# Patient Record
Sex: MALE | Race: BLACK OR AFRICAN AMERICAN | Employment: FULL TIME | URBAN - METROPOLITAN AREA
[De-identification: names, ages, dates, MRNs, and addresses within clinical notes are randomized per-mention and may not be internally consistent; named-entity substitution may affect disease eponyms.]

---

## 2017-02-07 ENCOUNTER — OFFICE VISIT (OUTPATIENT)
Dept: FAMILY MEDICINE | Facility: CLINIC | Age: 52
End: 2017-02-07

## 2017-02-07 VITALS
HEART RATE: 105 BPM | BODY MASS INDEX: 30.61 KG/M2 | TEMPERATURE: 100.4 F | WEIGHT: 195 LBS | DIASTOLIC BLOOD PRESSURE: 77 MMHG | SYSTOLIC BLOOD PRESSURE: 130 MMHG | HEIGHT: 67 IN | OXYGEN SATURATION: 97 %

## 2017-02-07 DIAGNOSIS — I10 ESSENTIAL HYPERTENSION WITH GOAL BLOOD PRESSURE LESS THAN 140/90: ICD-10-CM

## 2017-02-07 DIAGNOSIS — M75.42 IMPINGEMENT SYNDROME, SHOULDER, LEFT: Primary | ICD-10-CM

## 2017-02-07 DIAGNOSIS — M75.112 INCOMPLETE TEAR OF LEFT ROTATOR CUFF: ICD-10-CM

## 2017-02-07 DIAGNOSIS — E78.5 HYPERLIPIDEMIA LDL GOAL <130: ICD-10-CM

## 2017-02-07 LAB
ALT SERPL W P-5'-P-CCNC: 25 U/L (ref 0–70)
ANION GAP SERPL CALCULATED.3IONS-SCNC: 8 MMOL/L (ref 3–14)
BUN SERPL-MCNC: 14 MG/DL (ref 7–30)
CALCIUM SERPL-MCNC: 9.7 MG/DL (ref 8.5–10.1)
CHLORIDE SERPL-SCNC: 100 MMOL/L (ref 94–109)
CHOLEST SERPL-MCNC: 166 MG/DL
CO2 SERPL-SCNC: 29 MMOL/L (ref 20–32)
CREAT SERPL-MCNC: 0.96 MG/DL (ref 0.66–1.25)
GFR SERPL CREATININE-BSD FRML MDRD: 82 ML/MIN/1.7M2
GLUCOSE SERPL-MCNC: 112 MG/DL (ref 70–99)
HDLC SERPL-MCNC: 51 MG/DL
LDLC SERPL CALC-MCNC: 88 MG/DL
NONHDLC SERPL-MCNC: 115 MG/DL
POTASSIUM SERPL-SCNC: 3.9 MMOL/L (ref 3.4–5.3)
SODIUM SERPL-SCNC: 137 MMOL/L (ref 133–144)
TRIGL SERPL-MCNC: 133 MG/DL

## 2017-02-07 PROCEDURE — 99214 OFFICE O/P EST MOD 30 MIN: CPT | Performed by: FAMILY MEDICINE

## 2017-02-07 PROCEDURE — 84460 ALANINE AMINO (ALT) (SGPT): CPT | Performed by: FAMILY MEDICINE

## 2017-02-07 PROCEDURE — 80061 LIPID PANEL: CPT | Performed by: FAMILY MEDICINE

## 2017-02-07 PROCEDURE — 80048 BASIC METABOLIC PNL TOTAL CA: CPT | Performed by: FAMILY MEDICINE

## 2017-02-07 PROCEDURE — 36415 COLL VENOUS BLD VENIPUNCTURE: CPT | Performed by: FAMILY MEDICINE

## 2017-02-07 RX ORDER — SIMVASTATIN 40 MG
40 TABLET ORAL EVERY EVENING
Qty: 90 TABLET | Refills: 1 | Status: SHIPPED | OUTPATIENT
Start: 2017-02-07 | End: 2017-08-14

## 2017-02-07 RX ORDER — SULINDAC 200 MG/1
200 TABLET ORAL 2 TIMES DAILY WITH MEALS
Qty: 60 TABLET | Refills: 1 | Status: SHIPPED | OUTPATIENT
Start: 2017-02-07 | End: 2020-09-23

## 2017-02-07 ASSESSMENT — PAIN SCALES - GENERAL: PAINLEVEL: SEVERE PAIN (6)

## 2017-02-07 NOTE — PATIENT INSTRUCTIONS
================================================================================  Normal Values   Blood pressure  <140/90 for most adults    <130/80 for some chronic diseases (ask your care team about yours)    BMI (body mass index)  18.5-25 kg/m2 (based on height and weight)     Thank you for visiting [facility]    Normal or non-critical lab and imaging results will be communicated to you by MyChart, letter or phone within 7 days.  If you do not hear from us within 10 days, please call the clinic. If you have a critical or abnormal lab result, we will notify you by phone as soon as possible.     If you have any questions regarding your visit please contact:     Team Comfort:   Clinic Hours Telephone Number   Dr. Maurice Dean 7am-5pm  Monday - Friday [phone]  Peyton RN  Liane Montesinos RN   Pharmacy 8:00am-8pm Monday-Friday    9am-5pm Saturday-Sunday [phone]   Urgent Care 11am-9pm Monday-Friday        9am-5pm Saturday-Sunday [phone]     After hours, weekend or if you need to make an appointment with your primary provider please call [phone].   After Hours nurse advise: call [facility]: [phone]    Medication Refills:  Call your pharmacy and they will forward the refill to us. Please allow 3 business days for your refills to be completed.

## 2017-02-07 NOTE — MR AVS SNAPSHOT
After Visit Summary   2/7/2017    Jose Alejandro Deras    MRN: [id number]           Patient Information     Date Of Birth          1965        Visit Information        Provider Department      2/7/2017 4:40 PM Maurice Barroso MD [facility]        Today's Diagnoses     Impingement syndrome, shoulder, left    -  1     Incomplete tear of left rotator cuff         Hyperlipidemia LDL goal <130         Essential hypertension with goal blood pressure less than 140/90           Care Instructions        ================================================================================  Normal Values   Blood pressure  <140/90 for most adults    <130/80 for some chronic diseases (ask your care team about yours)    BMI (body mass index)  18.5-25 kg/m2 (based on height and weight)     Thank you for visiting [facility]    Normal or non-critical lab and imaging results will be communicated to you by MyChart, letter or phone within 7 days.  If you do not hear from us within 10 days, please call the clinic. If you have a critical or abnormal lab result, we will notify you by phone as soon as possible.     If you have any questions regarding your visit please contact:     Team Comfort:   Clinic Hours Telephone Number   Dr. Maurice Gudino Dr. Vocal 7am-5pm  Monday - Friday [phone]  Peyton Montesinos RN   Pharmacy 8:00am-8pm Monday-Friday    9am-5pm Saturday-Sunday [phone]   Urgent Care 11am-9pm Monday-Friday        9am-5pm Saturday-Sunday [phone]     After hours, weekend or if you need to make an appointment with your primary provider please call [phone].   After Hours nurse advise: call [facility]: [phone]    Medication Refills:  Call your pharmacy and they will forward the refill to us. Please allow 3 business days for your refills to be completed.                 Follow-ups after your visit        Additional Services     ORTHO  REFERRAL       [facility] is referring you to the Orthopedic  Services at [facility].       The  Representative will assist you in the coordination of your Orthopedic and Musculoskeletal Care as prescribed by your physician.    The  Representative will call you within 1 business day to help schedule your appointment, or you may contact the  Representative at:    All areas ~ [phone]     Type of Referral : Surgical / Specialist (saw Dr. Monroe previously)      Timeframe requested: Routine    Coverage of these services is subject to the terms and limitations of your health insurance plan.  Please call member services at your health plan with any benefit or coverage questions.      If X-rays, CT or MRI's have been performed, please contact the facility where they were done to arrange for , prior to your scheduled appointment.  Please bring this referral request to your appointment and present it to your specialist.                  Who to contact     If you have questions or need follow up information about today's clinic visit or your schedule please contact [facility] directly at [phone].  Normal or non-critical lab and imaging results will be communicated to you by MyChart, letter or phone within 4 business days after the clinic has received the results. If you do not hear from us within 7 days, please contact the clinic through [employer] or phone. If you have a critical or abnormal lab result, we will notify you by phone as soon as possible.  Submit refill requests through [employer] or call your pharmacy and they will forward the refill request to us. Please allow 3 business days for your refill to be completed.          Additional Information About Your Visit        [employer] Information     [employer] lets you send messages to your  "doctor, view your test results, renew your prescriptions, schedule appointments and more. To sign up, go to [email or web address] . Click on \"Log in\" on the left side of the screen, which will take you to the Welcome page. Then click on \"Sign up Now\" on the right side of the page.     You will be asked to enter the access code listed below, as well as some personal information. Please follow the directions to create your username and password.     Your access code is: [email or web address]  Expires: 2017  5:00 PM     Your access code will  in 90 days. If you need help or a new code, please call your [place] clinic or [phone].        Care EveryWhere ID     This is your Care EveryWhere ID. This could be used by other organizations to access your [facility] medical records  [id number]        Your Vitals Were     Pulse Temperature Height BMI (Body Mass Index) Pulse Oximetry       105 100.4  F (38  C) (Oral) 5' 7\" (1.702 m) 30.53 kg/m2 97%        Blood Pressure from Last 3 Encounters:   17 130/77   06/10/16 118/69   16 124/78    Weight from Last 3 Encounters:   17 195 lb (88.451 kg)   06/10/16 198 lb 3.2 oz (89.903 kg)   16 208 lb (94.348 kg)              We Performed the Following     ALT     Basic metabolic panel     Lipid panel reflex to direct LDL     ORTHO  REFERRAL          Today's Medication Changes          These changes are accurate as of: 17  5:00 PM.  If you have any questions, ask your nurse or doctor.               Start taking these medicines.        Dose/Directions    sulindac 200 MG tablet   Commonly known as:  CLINORIL   Used for:  Impingement syndrome, shoulder, left, Incomplete tear of left rotator cuff   Started by:  Maurice Barroso MD        Dose:  200 mg   Take 1 tablet (200 mg) by mouth 2 times daily (with meals)   Quantity:  60 tablet   Refills:  1         Stop taking these medicines if you haven't already. Please contact your care team if you have " questions.     ibuprofen 800 MG tablet   Commonly known as:  ADVIL/MOTRIN   Stopped by:  Maurice Barroso MD                Where to get your medicines      These medications were sent to [id number] - [facility], [place] - [address] AT [facility] [address] [place] [address]     Phone:  [phone]    - simvastatin 40 MG tablet  - sulindac 200 MG tablet             Primary Care Provider Office Phone # Fax #    Maurice Barroso -[phone]       [facility] [address]  [facility] [place] [address]        Thank you!     Thank you for choosing [facility]  for your care. Our goal is always to provide you with excellent care. Hearing back from our patients is one way we can continue to improve our services. Please take a few minutes to complete the written survey that you may receive in the mail after your visit with us. Thank you!             Your Updated Medication List - Protect others around you: Learn how to safely use, store and throw away your medicines at www.disposemymeds.org.          This list is accurate as of: 2/7/17  5:00 PM.  Always use your most recent med list.                   Brand Name Dispense Instructions for use    chlorthalidone 25 MG tablet    HYGROTON    90 tablet    TAKE 1 TABLET(25 MG) BY MOUTH DAILY FOR BLOOD PRESSURE       lisinopril 20 MG tablet    PRINIVIL/ZESTRIL    90 tablet    TAKE 1 TABLET(20 MG) BY MOUTH DAILY FOR BLOOD PRESSURE       MULTIPLE VITAMINS PO      Take by mouth daily       simvastatin 40 MG tablet    ZOCOR    90 tablet    Take 1 tablet (40 mg) by mouth every evening for cholesterol.       sulindac 200 MG tablet    CLINORIL    60 tablet    Take 1 tablet (200 mg) by mouth 2 times daily (with meals)

## 2017-02-07 NOTE — PROGRESS NOTES
"  SUBJECTIVE:                                                    Jose Alejandro Deras is a 51 year old male who presents to clinic today for the following health issues:    Joint Pain     Onset: chronic, worsening a few weeks ago     Description:   Location: left shoulder  Character: Sharp and Dull ache    Intensity: 6/10    Progression of Symptoms: worsening    Accompanying Signs & Symptoms:  radiation of pain to elbow & upper back       History:   Previous similar pain: YES - chronic, no known injury    Precipitating factors:   Trauma or overuse: no     Alleviating factors:  Improved by: cortisone injection - helped a lot       Therapies Tried and outcome:  Heat & cold, Cortisone injections, seen Ortho (Dr. Monroe), MRI      Past medical, family, and social histories, medications, and allergies are reviewed and updated in Epic.     ROS:  C: NEGATIVE for fever, chills, change in weight  E/M: NEGATIVE for ear, mouth and throat problems  R: NEGATIVE for significant cough or SOB  CV: NEGATIVE for chest pain, palpitations or peripheral edema  ROS otherwise negative      Any history above obtained by the Medical Assistant was reviewed by Dr. Maurice Barroso MD, and edited when necessary.    This document serves as a record of the services and decisions personally performed and made by Dr. Barroso. It was created on his behalf by Raina Johns, a trained medical scribe. The creation of this document is based on the provider's statements to the medical scribe.  Raina Johns February 7, 2017 4:34 PM   OBJECTIVE:                                                    /77 mmHg  Pulse 105  Temp(Src) 100.4  F (38  C) (Oral)  Ht 5' 7\" (1.702 m)  Wt 195 lb (88.451 kg)  BMI 30.53 kg/m2  SpO2 97%  Body mass index is 30.53 kg/(m^2).  GENERAL: healthy, alert and no distress  EYES: Eyes grossly normal to inspection, PERRL and conjunctivae and sclerae normal  MS: no gross musculoskeletal defects noted, no edema  SKIN: no " suspicious lesions or rashes  NEURO: Normal strength and tone, mentation intact and speech normal, cranial nerves 2-12 intact   PSYCH: mentation appears normal, affect normal/bright      ASSESSMENT/PLAN:                                                    (M75.42) Impingement syndrome, shoulder, left  (primary encounter diagnosis)  (M75.112) Incomplete tear of left rotator cuff  Comment: the patient is interested in getting another steroid injection  Plan: sulindac (CLINORIL) 200 MG tablet, ORTHO          REFERRAL. ORDER FOR DME (arm sling)            (E78.5) Hyperlipidemia LDL goal <130  Comment: non-fasting. historically at goal, needs lab and prescriptions updated.  Plan: simvastatin (ZOCOR) 40 MG tablet, Lipid panel         reflex to direct LDL, ALT            (I10) Essential hypertension with goal blood pressure less than 140/90  Comment: well controlled  Plan: Basic metabolic panel               The information in this document, created by the medical scribe for me, accurately reflects the services I personally performed and the decisions made by me. I have reviewed and approved this document for accuracy prior to leaving the patient care area.  Maurice Barroso MD

## 2017-02-07 NOTE — LETTER
[facility]  [address]  [place] [address]      February 28, 2017      Jose Alejandro Deras  [address]  [facility] [place] [address]            Dear Jose Alejandro Deras    All of your labs were normal for you.     Enclosed is a copy of the results.  It was a pleasure to see you at your last appointment.    If you have any questions or concerns, please call myself or my nurse at [phone].      Sincerely,      Maurice Barroso MD/JUAN DANIEL Truong MA      Results for orders placed or performed in visit on 02/07/17   Lipid panel reflex to direct LDL   Result Value Ref Range    Cholesterol 166 <200 mg/dL    Triglycerides 133 <150 mg/dL    HDL Cholesterol 51 >39 mg/dL    LDL Cholesterol Calculated 88 <100 mg/dL    Non HDL Cholesterol 115 <130 mg/dL   ALT   Result Value Ref Range    ALT 25 0 - 70 U/L   Basic metabolic panel   Result Value Ref Range    Sodium 137 133 - 144 mmol/L    Potassium 3.9 3.4 - 5.3 mmol/L    Chloride 100 94 - 109 mmol/L    Carbon Dioxide 29 20 - 32 mmol/L    Anion Gap 8 3 - 14 mmol/L    Glucose 112 (H) 70 - 99 mg/dL    Urea Nitrogen 14 7 - 30 mg/dL    Creatinine 0.96 0.66 - 1.25 mg/dL    GFR Estimate 82 >60 mL/min/1.7m2    GFR Estimate If Black >90   GFR Calc   >60 mL/min/1.7m2    Calcium 9.7 8.5 - 10.1 mg/dL

## 2017-02-07 NOTE — NURSING NOTE
"Chief Complaint   Patient presents with     Shoulder Pain     Left       Initial /77 mmHg  Pulse 105  Temp(Src) 100.4  F (38  C) (Oral)  Ht 5' 7\" (1.702 m)  Wt 195 lb (88.451 kg)  BMI 30.53 kg/m2  SpO2 97% Estimated body mass index is 30.53 kg/(m^2) as calculated from the following:    Height as of this encounter: 5' 7\" (1.702 m).    Weight as of this encounter: 195 lb (88.451 kg).  Medication Reconciliation: malou Hare MA      "

## 2017-02-15 ENCOUNTER — OFFICE VISIT (OUTPATIENT)
Dept: ORTHOPEDICS | Facility: CLINIC | Age: 52
End: 2017-02-15

## 2017-02-15 VITALS — BODY MASS INDEX: 30.61 KG/M2 | HEIGHT: 67 IN | WEIGHT: 195 LBS | RESPIRATION RATE: 18 BRPM

## 2017-02-15 DIAGNOSIS — M75.42 IMPINGEMENT SYNDROME, SHOULDER, LEFT: Primary | ICD-10-CM

## 2017-02-15 PROCEDURE — 20610 DRAIN/INJ JOINT/BURSA W/O US: CPT | Mod: LT | Performed by: ORTHOPAEDIC SURGERY

## 2017-02-15 RX ORDER — TRIAMCINOLONE ACETONIDE 40 MG/ML
40 INJECTION, SUSPENSION INTRA-ARTICULAR; INTRAMUSCULAR ONCE
Qty: 1 ML | Refills: 0 | OUTPATIENT
Start: 2017-02-15 | End: 2017-02-15

## 2017-02-15 NOTE — MR AVS SNAPSHOT
"              After Visit Summary   2/15/2017    Jose Alejandro Deras    MRN: [id number]           Patient Information     Date Of Birth          1965        Visit Information        Provider Department      2/15/2017 4:00 PM Tj Monroe MD [facility]         Follow-ups after your visit        Your next 10 appointments already scheduled     Feb 15, 2017  4:00 PM CST   Return Visit with Tj Monroe MD   [facility] ([facility])    [address] [place] [address] [phone]              Who to contact     If you have questions or need follow up information about today's clinic visit or your schedule please contact [facility] directly at [phone].  Normal or non-critical lab and imaging results will be communicated to you by MyChart, letter or phone within 4 business days after the clinic has received the results. If you do not hear from us within 7 days, please contact the clinic through MyChart or phone. If you have a critical or abnormal lab result, we will notify you by phone as soon as possible.  Submit refill requests through [employer] or call your pharmacy and they will forward the refill request to us. Please allow 3 business days for your refill to be completed.          Additional Information About Your Visit        MyChart Information     [employer] lets you send messages to your doctor, view your test results, renew your prescriptions, schedule appointments and more. To sign up, go to [email or web address] . Click on \"Log in\" on the left side of the screen, which will take you to the Welcome page. Then click on \"Sign up Now\" on the right side of the page.     You will be asked to enter the access code listed below, as well as some personal information. Please follow the directions to create your username and password.     Your access code is: [email or web address]  Expires: 5/8/2017  5:00 PM   " "  Your access code will  in 90 days. If you need help or a new code, please call your [place] clinic or [phone].        Care EveryWhere ID     This is your Care EveryWhere ID. This could be used by other organizations to access your [facility] medical records  [id number]        Your Vitals Were     Respirations Height BMI (Body Mass Index)             18 1.702 m (5' 7\") 30.54 kg/m2          Blood Pressure from Last 3 Encounters:   17 130/77   06/10/16 118/69   16 124/78    Weight from Last 3 Encounters:   02/15/17 88.5 kg (195 lb)   17 88.5 kg (195 lb)   06/10/16 89.9 kg (198 lb 3.2 oz)              Today, you had the following     No orders found for display       Primary Care Provider Office Phone # Fax #    Maurice Barroso -[phone]       [facility] [address]  [facility] [address]        Thank you!     Thank you for choosing [facility]  for your care. Our goal is always to provide you with excellent care. Hearing back from our patients is one way we can continue to improve our services. Please take a few minutes to complete the written survey that you may receive in the mail after your visit with us. Thank you!             Your Updated Medication List - Protect others around you: Learn how to safely use, store and throw away your medicines at www.disposemymeds.org.          This list is accurate as of: 2/15/17  3:56 PM.  Always use your most recent med list.                   Brand Name Dispense Instructions for use    chlorthalidone 25 MG tablet    HYGROTON    90 tablet    TAKE 1 TABLET(25 MG) BY MOUTH DAILY FOR BLOOD PRESSURE       lisinopril 20 MG tablet    PRINIVIL/ZESTRIL    90 tablet    TAKE 1 TABLET(20 MG) BY MOUTH DAILY FOR BLOOD PRESSURE       MULTIPLE VITAMINS PO      Take by mouth daily       simvastatin 40 MG tablet    ZOCOR    90 tablet    Take 1 tablet (40 mg) by mouth every evening for cholesterol.       " sulindac 200 MG tablet    CLINORIL    60 tablet    Take 1 tablet (200 mg) by mouth 2 times daily (with meals)

## 2017-02-15 NOTE — NURSING NOTE
"Chief Complaint   Patient presents with     RECHECK     Left shoulder pain last injection 4/27/16.       Initial Resp 18  Ht 1.702 m (5' 7\")  Wt 88.5 kg (195 lb)  BMI 30.54 kg/m2 Estimated body mass index is 30.54 kg/(m^2) as calculated from the following:    Height as of this encounter: 1.702 m (5' 7\").    Weight as of this encounter: 88.5 kg (195 lb).  Medication Reconciliation: complete   Nida Rojas MA      "

## 2017-02-15 NOTE — PROGRESS NOTES
.  CHIEF COMPLAINT:   Chief Complaint   Patient presents with     RECHECK     Left shoulder pain last injection 4/27/16.       HISTORY:  Jose Alejandro Deras is a 51 year old male, right  -hand dominant, who is seen for left shoulder pain that started 3.5 years ago. No injury. The pain started in his neck, then radiated down the back and side of shoulder, now down to the elbow and past to near the wrist. He has had x-ray and MRI, negative, 2013. Last cortisone injection 4/27/2016, 10 months ago, notes this worked great. Pain started coming back a few weeks ago. Pain has been episodic, came out of nowhere. Positive night pain. Denies numbness and tingling. Has tried vicodin, but hasn't helped, previously helped with rest, Tylenol, and cortisone injections. He would like repeat injections today.       Onset: ~3.5 years ago without injury, woke up with pain.  Symptoms have been slow, improving since that time.  Aggrevated by: activities  Relieved by: rest  Present symptoms: pain with ADL's (dressing),  pain with overhead activities,  pain reaching behind back,  pain reaching out or away from body (flexion/ abduction),  positional night pain,  pain lifting,  Pain location: posterior shoulder, side of shoulder and down the arm  Pain severity: 3/10  Pain quality: shooting  Frequency of symptoms: are constant  Associated symptoms: neck pain, radiating pain to elbow and forearm, which seem to the patient to be related to the shoulder symptoms    Treatment up to this point: rest, Tylenol, corticosteroid injection 4/27/2016, and Vicodin  Has not tried: ice, Heat, NSAIDS, PT   Prior history of related problems: yes, shoulder pain off/on over the years alleviated with tylenol, rest    Usual level of work activity: .    Other PMH:  has a past medical history of Erectile dysfunction; Essential hypertension, benign; Hyperlipidemia LDL goal <130; Hypotestosteronism; IGT (impaired glucose tolerance); and Vitamin D  deficiency.  Patient Active Problem List    Diagnosis Date Noted     Impingement syndrome, shoulder 11/20/2013     Priority: Medium     Rotator cuff tendinitis 11/20/2013     Priority: Medium     Obesity, Class I, BMI 30-34.9 08/23/2013     Priority: Medium     Hypotestosteronism 04/30/2012     Priority: Medium     Vitamin D deficiency 04/30/2012     Priority: Medium     Impaired fasting glucose 04/30/2012     Priority: Medium     Hyperlipidemia LDL goal <130 04/12/2012     Priority: Medium     Essential hypertension with goal blood pressure less than 140/90 04/12/2012     Priority: Medium     Erectile dysfunction 04/12/2012     Priority: Medium       Surgical Hx:  has a past surgical history that includes no history of surgery.    Medications:   Current Outpatient Prescriptions:      simvastatin (ZOCOR) 40 MG tablet, Take 1 tablet (40 mg) by mouth every evening for cholesterol., Disp: 90 tablet, Rfl: 1     sulindac (CLINORIL) 200 MG tablet, Take 1 tablet (200 mg) by mouth 2 times daily (with meals), Disp: 60 tablet, Rfl: 1     lisinopril (PRINIVIL,ZESTRIL) 20 MG tablet, TAKE 1 TABLET(20 MG) BY MOUTH DAILY FOR BLOOD PRESSURE, Disp: 90 tablet, Rfl: 1     chlorthalidone (HYGROTON) 25 MG tablet, TAKE 1 TABLET(25 MG) BY MOUTH DAILY FOR BLOOD PRESSURE, Disp: 90 tablet, Rfl: 1     MULTIPLE VITAMINS PO, Take by mouth daily, Disp: , Rfl:     Allergies: No Known Allergies    Social Hx: .  reports that he has never smoked. He has never used smokeless tobacco. He reports that he drinks alcohol. He reports that he does not use illicit drugs.    Family Hx: family history includes DIABETES in his mother; Hypertension in his mother. There is no history of C.A.D., CEREBROVASCULAR DISEASE, or CANCER..    REVIEW OF SYSTEMS:   CONSTITUTIONAL:NEGATIVE for fever, chills, change in weight  INTEGUMENTARY/SKIN: NEGATIVE for worrisome rashes, moles or lesions  MUSCULOSKELETAL:See HPI above  NEURO: NEGATIVE for weakness,  "dizziness or paresthesias    PHYSICAL EXAM:  Resp 18  Ht 1.702 m (5' 7\")  Wt 88.5 kg (195 lb)  BMI 30.54 kg/m2   GENERAL APPEARANCE: healthy, alert, no distress  SKIN: no suspicious lesions or rashes  NEURO: Normal strength and tone, mentation intact and speech normal  PSYCH:  mentation appears normal and affect normal, not anxious  RESPIRATORY: No increased work of breathing.  VASCULAR: Radial pulses 2+ and brisk cappillary refill     MUSCULOSKELETAL:          RIGHT UPPER EXTREMITY:  Sensation intact to light touch in median, radial, ulnar and axillary nerve distributions  Palpable 2+ radial pulse, brisk capillary refill to all fingers, wwp  Intact epl fpl fdp edc wrist flexion/extension biceps triceps deltoid    RIGHT SHOULDER:  Shoulder Inspection: no swelling, bruising, discoloration, or obvious deformity or asymmetry  no atrophy  Tender: none  Non-tender: SC joint, proximal-mid clavicle, mid-distal clavicle, AC joint, acromion, anterior capsule, proximal bicep tendon, greater tuberosity, proximal humerus, supraspinatus , infraspinatus, upper trapezius muscle and rhomboids  Range of Motion:   Active:forward flexion 180 degrees, external rotation  70 degrees, internal rotation  T12   Passive: same  Strength: forward flexion 5/5, External rotation 5/5  Liftoff: Able, internal rotation to T10, causes muscle spasm of the right shoulder.  Impingement: negative.  Special tests: Spurling's: Negative  Belly Press: Negative  Empty Can: Negative    LEFT UPPER EXTREMITY:  Sensation intact to light touch in median, radial, ulnar and axillary nerve distributions  Palpable 2+ radial pulse, brisk capillary refill to all fingers, wwp  Intact epl fpl fdp edc wrist flexion/extension biceps triceps deltoid    LEFT SHOULDER:  Shoulder Inspection: no swelling, bruising, discoloration, or obvious deformity or asymmetry  no atrophy  Tender: greater tuberosity, supraspinatus , upper trapezius muscle and rhomboids  Non-tender: SC joint, " proximal-mid clavicle, mid-distal clavicle, AC joint, acromion, anterior capsule and proximal bicep tendon  Range of Motion:   Active:forward flexion 170 degrees, external rotation  70 degrees, internal rotation  T10   Passive: same  Strength: forward flexion 5/5,  External rotation 5/5, Liftoff: Able, internal rotation T10  Impingement: 2+  Special tests: Herkimer's: Negative  Yergason's: Negative  Speed: Negative  Spurling's: Negative  Belly Press: Negative  Empty Can: Negative        X-RAY INTERPRETATION: no new xrays today.  3 views left  shoulder obtained 10/22/2013 were reviewed personally in clinic today with the patient. On my review, No obvious fracture or dislocation. No obvious bony abnormality or lesion. Mild-moderate acromio-clavicular degenerative changes.      MRI left  shoulder:  11/5/2013  IMPRESSION:  1. Mild to moderate tendinopathy of the right supraspinatus, with a  partial-thickness, undersurface tear near the attachment. Minimal  undersurface fraying of the infraspinatus is also noted. Subscapularis  is not well evaluated due to redundancy given positioning of the  shoulder in internal rotation; however, there may be minimal  undersurface fraying of the superior fibers.  2. Moderate to severe acromioclavicular joint osteoarthritis.  3. Small tear of the posterior-superior glenoid labrum, with minimal  fraying along the far anterior inferior labrum. No detached fragments  are seen.    ASSESSMENT: Jose Alejandro Deras is a 51 year old male, right -hand dominant with left shoulder pain, partial thickness rotator cuff tear with tendinosis, impingement syndrome, acromio-clavicular arthritis, labral degeneration.     PLAN:   * Reviewed imaging studies with patient. Also, clinical exam findings. Consistent with impingement syndrome, rotator cuff tendinosis with partial thickness tearing, acromio-clavicular arthritis. Did emphasize that the MRI is from 2013, so things could have changed from them with  respect to the rotator cuff.  * discussed obtaining a new MRI, or try repeating injection.  * patient elects for injection today.    Treatment:    * Rest  * Activity modification - avoid activities that aggravate symptoms or started symptoms at onset.  * NSAIDS - regular use for inflammation, with food, as long as no contra-indications. Please discuss with pcp if needed.  * Ice twice daily to three times daily, 15-20 minutes at a time  * heat may be beneficial prior to exercising  * Physical Therapy for strengthening, stretching and range of motion exercises of rotator cuff and periscapular stabilization.  * Tylenol as needed for pain  * Injections: cortisone injections may be beneficial to help decrease swelling and inflammation within the shoulder or bursa, and decrease pain. With decreased pain, Physical Therapy and exercises will be more effective and efficient. Patient elects to proceed with cortisone injection.  * All questions and concerns addressed to patient satisfaction before discharge from clinic.   * Return to clinic as needed.    * consider repeat MRI in future.      PROCEDURE NOTE:  The risks, perceived benefits and potential complications (including but not limited to: bleeding, infection, pain, scar, damage to adjacent structures, atrophy or necrosis of soft tissue, skin blanching, failure to relieve symptoms, worsening of symptoms, allergic reaction) of injection were discussed with the patient. Questions were addressed and answered.The patient elected to proceed. Written informed consent was obtained. The correct procedural site was identified and confirmed. A Left Shoulder intraarticular injection was performed using 2mL Kenalog-40 40mg per mL and 7mL (4mL 1% lidocaine, 3mL 0.25% marcaine)  of local anesthetic after sterile prep, to the correct procedural site. Sterile bandaid applied. This was tolerated well by the patient. No apparent complications. Did also discuss that if diabetic, recommend  close monitoring of blood sugars over the next week as cortisone injections can temporarily elevate blood sugars.    The information in this document, created by a scribe for me, accurate reflects the services I personally performed and the decisions made by me. I have reviewed and approved this document for accuracy.      Tj Monroe M.D., M.S.  Dept. of Orthopaedic Surgery  [facility]

## 2017-02-23 ENCOUNTER — TELEPHONE (OUTPATIENT)
Dept: FAMILY MEDICINE | Facility: CLINIC | Age: 52
End: 2017-02-23

## 2017-02-23 NOTE — TELEPHONE ENCOUNTER
Reason for Call:  Request for results:    Name of test or procedure: Basic Metabolic Panel, ALT, Lipid Reflex to Direct LDL Panel    Date of test of procedure: 02/07/17    Location of the test or procedure: [facility]    OK to leave the result message on voice mail or with a family member? YES    Phone number Patient can be reached at:  Home number on file [phone] (home)    Additional comments: Pt calling for he came in for a lab apt on 02/07/17 and would like a call back to go over results as well as have those results mailed back to his home.     Call taken on 2/23/2017 at 10:32 AM by Basim Laughlin

## 2017-02-27 NOTE — TELEPHONE ENCOUNTER
Pt is very impatient now for these results  It is almost a month he says    He left a message thurs AM  Please call him today    Thank you

## 2017-05-09 ENCOUNTER — TELEPHONE (OUTPATIENT)
Dept: FAMILY MEDICINE | Facility: CLINIC | Age: 52
End: 2017-05-09

## 2017-05-09 NOTE — LETTER
[address] [address]  [phone]  Dept: [phone]      May 9, 2017      Jose Alejandro Deras  [address]  [facility] [address]      Dear Jose Alejandro Deras,     At [facility] we care about your health and are committed to providing quality patient care.    Which includes staying current on preventive cancer screenings.  You can increase your chances of finding and treating cancers through regular screenings.      Our records indicate you may be due for the following preventive screening(s):    Colonoscopy    Colonoscopy is recommended every ten years for everyone age 50 and older. Please take a moment to read over the enclosed information packet about colon cancer screening. We strongly urge our patient's to consider having a colonoscopy done, which is the best screening test available and only needs to be done every 10 years if normal. If you are unwilling or unable to have a colonoscopy then we recommend the annual stool testing for blood. This test is called a FIT test and it looks for blood in the stool.     To schedule an appointment or discuss this screening further, you may contact us by phone at the [facility] at [phone] or online through the patient [email or web address] @ [email or web address]    If you have had any of the screenings listed above at another facility, please call us so that we may update your chart.      Your partners in health,         Quality Committee at [facility]/[email or web address]

## 2017-05-09 NOTE — TELEPHONE ENCOUNTER
Panel Management Review      BP Readings from Last 1 Encounters:   02/07/17 130/77    ,   Lab Results   Component Value Date    A1C 6.0 04/20/2016    A1C 5.9 08/23/2013   , 2/23/2017    Fail List measure: Colonoscopy      Patient is due/failing the following:   COLONOSCOPY/FIT    Action needed:   Letter    Type of outreach:    Sent letter.    Questions for provider review:    None                                                                                                                                    Alexi Wilder      Chart routed to  .

## 2017-05-11 ENCOUNTER — OFFICE VISIT (OUTPATIENT)
Dept: FAMILY MEDICINE | Facility: CLINIC | Age: 52
End: 2017-05-11

## 2017-05-11 VITALS
DIASTOLIC BLOOD PRESSURE: 84 MMHG | OXYGEN SATURATION: 96 % | HEART RATE: 93 BPM | WEIGHT: 188 LBS | TEMPERATURE: 98.4 F | SYSTOLIC BLOOD PRESSURE: 132 MMHG | HEIGHT: 67 IN | BODY MASS INDEX: 29.51 KG/M2

## 2017-05-11 DIAGNOSIS — G51.0 BELL'S PALSY: Primary | ICD-10-CM

## 2017-05-11 DIAGNOSIS — Z12.11 SCREEN FOR COLON CANCER: ICD-10-CM

## 2017-05-11 PROCEDURE — 99173 VISUAL ACUITY SCREEN: CPT | Performed by: FAMILY MEDICINE

## 2017-05-11 PROCEDURE — 99214 OFFICE O/P EST MOD 30 MIN: CPT | Performed by: FAMILY MEDICINE

## 2017-05-11 PROCEDURE — 92551 PURE TONE HEARING TEST AIR: CPT | Performed by: FAMILY MEDICINE

## 2017-05-11 RX ORDER — VALACYCLOVIR HYDROCHLORIDE 1 G/1
1000 TABLET, FILM COATED ORAL 3 TIMES DAILY
Qty: 21 TABLET | Refills: 0 | Status: SHIPPED | OUTPATIENT
Start: 2017-05-11 | End: 2017-05-18

## 2017-05-11 RX ORDER — PREDNISONE 20 MG/1
60 TABLET ORAL
Qty: 21 TABLET | Refills: 0 | Status: SHIPPED | OUTPATIENT
Start: 2017-05-11 | End: 2017-09-19

## 2017-05-11 NOTE — PATIENT INSTRUCTIONS
This summary includes the important diagnoses, test, medications and other important parts of your medical history.  Below are a few good we sites you can use to learn more about these.     Www.ivWatch.org : Up to date and easily searchable information on multiple topics.  Www.ivWatch.org/Pharmacy/c_[email or web address].asp : [facility] $4.99 medications  Www.medlineplus.gov : medication info, interactive tutorials, watch real surgeries online  Www.familydoctor.org : good info from the Academy of Family Physicians  Www.[facility].com : good info from the [facility]  Www.cdc.gov : public health info, travel advisories, epidemics (H1N1)  Www.aap.org : children's health info, normal development, vaccinations  Www.health.[facility].[place].us : [place] dept of heat, public health issues in [place], N1N1    Based on your medical history and these are the current health maintenance or preventive care services that you are due for (some may have been done at this visit:)  Health Maintenance Due   Topic Date Due     FIT Q1 YR (NO INBASKET)  04/25/2017     =================================================================================  Normal Values   Blood pressure  <140/90 for most adults    <130/80 for some chronic diseases (ask your care team about yours)    BMI (body mass index)  18.5-25 kg/m2 (based on height and weight)     Thank you for visiting [facility]    Normal or non-critical lab and imaging results will be communicated to you by MyChart, letter or phone within 7 days.  If you do not hear from us within 10 days, please call the clinic. If you have a critical or abnormal lab result, we will notify you by phone as soon as possible.     If you have any questions regarding your visit please contact:     Team Comfort:   Clinic Hours Telephone Number   Dr. Maurice Leggett   7am-5pm  Monday - Friday [phone]  [facility]   Pharmacy 8am-8pm  Monday-Thursday      8am-6pm Friday  9am-5pm Saturday- [phone]   Urgent Care 11am-8pm Monday-Friday        9am-5pm Saturday- [phone]     After hours, weekend or if you need to make an appointment with your primary provider please call [phone].   After Hours nurse advise: call [facility]: [phone]    Medication Refills:  Call your pharmacy and they will forward the refill to us. Please allow 3 business days for your refills to be completed.    Use [employer] (secure email communication and access to your chart) to send your primary care provider a message or make an appointment. Ask someone on your Team how to sign up for [employer]. To log on to [employer] or for more information in [employer] please visit the website at [email or web address].  As of 2013, all password changes, disabled accounts, or ID changes in [employer]/MyHealth will be done by our Access Services Department.   If you need help with your account or password, call: [phone]. Clinic staff no longer has the ability to change passwords.         Please call [facility]/[phone] to schedule your brain MRI.     Bell s Palsy  Bell's Palsy is a problem involving the nerve that controls the muscles on one side of the face.    The cause is unknown, but may be related to inflammation of the nerve. Symptoms usually appear only on the affected side. They may include:    Inability to close the eyelid    Tearing of the eye    Facial drooping    Drooling    Numbness or pain    Changes in taste    Sound sensitivity  Damage to the eye can be a serious problem. The inability to blink can cause the eye to dry out. An ulcer (sore) can then form on the cornea. Also, not blinking means that the eye has no protection from dirt and dust particles.   Treatment involves protecting and moistening the eye. Medicines may also help.  Most persons recover completely within 3 to 6  months. However, the condition sometimes returns months or years later.  Home care    Get plenty of rest and eat a healthy diet to help yourself recover.    Use Artificial Tears frequently during the day and at bedtime to prevent drying. These drops are available without prescription at your drug store.    Wear protective glasses especially when outside to protect from flying debris. Use sunglasses when outdoors.    Tape the eyelid closed at bedtime with a paper tape (available at your pharmacy). This has a very mild adhesive to avoid injury to the lid. This will protect your eye from injury while you sleep.    Sometimes medicines are prescribed to reduce inflammation or treat specific viral infections of the nerve. If medicines are prescribed, take them exactly as directed.  Usually there is a 10-day course of medication that is started as soon as possible.  Taking this medicine as prescribed will help with a full recovery.      Use low heat, for example from a heating pad, on the affected area.  This can help reduce pain and swelling.      If you are experiencing sever pain, contact your health care provider.  Follow-up care  Follow up with your healthcare provider as advised. If you referred to a specialist, make that appointment promptly.  When to seek medical advice  Call your healthcare provider if any of the following occur:    Severe eye redness    Eye pain    Thick drainage from the eye    Change in vision (such as double vision or losing vision)    Fever over 100.4 F (38 C) or as directed by your healthcare provider    Headache, neck pain, weakness, trouble speaking or walking, or other unexplained symptoms    3515-7265 [employer]. [address] [place] [address] 24157. All rights reserved. This information is not intended as a substitute for professional medical care. Always follow your healthcare professional's instructions.        Bell s Palsy  Bell s palsy is a nerve disorder that  usually happens suddenly and without warning. This condition happens when a nerve that controls facial movement is damaged. Nerve damage can happen for many reasons. But most cases of Bell s palsy are probably caused by a virus.  Symptoms of Bell s palsy  Here are signs of the disorder:     Mild weakness to total paralysis of one side of your face    Drooping mouth, drooling on one side of mouth    Trouble closing one eye    Noises seeming louder than usual    Change in your sense of taste  When to go to the emergency department (ED)  There are conditions, such as stroke, that may look like Bell's palsy and are medical emergencies. Therefore, you should seek emergent medical care if you notice facial weakness or drooping. Although Bell s palsy can be alarming, it s rarely serious. Many people begin to improve in about 2 weeks, even without treatment. It is important to be seen as soon as possible. Most research shows that treatment with beneficial results was received within the first few days of symptoms.   Treatment  To treat Bell s palsy, you may be given steroid medicines. This helps reduce swelling of the affected nerve. In some cases, your healthcare provider may prescribe an antiviral medicine. Your open eye may be covered with a patch to prevent it from drying out. You also may need to use eye drops and ointments for a time. Your healthcare provider will discuss follow-up care with you, including the possible need for further treatment to help your facial muscles return to normal.    5731-4899 [employer]. [address] [place] [address] 14113. All rights reserved. This information is not intended as a substitute for professional medical care. Always follow your healthcare professional's instructions.

## 2017-05-11 NOTE — PROGRESS NOTES
SUBJECTIVE:                                                    Jose Alejandro Deras is a 51 year old male who presents to clinic today for the following health issues:    Possible Allergies     Onset: 5/6/17    Description:   Nasal congestion: no  Sneezing: no  Red, itchy eyes: No, but watery only, unable to close right eye tightly and unable to blink normally. Patient reports he would get soap in his right eye while showering because he could not make a tight seal when closing his eyes.     Progression of Symptoms:  same    Accompanying Signs & Symptoms:  Cough: no  Wheezing: no  Rash: no  Sinus/facial pain: no  -Patient reports his tongue is numb/tasteless.  -He notes he can't swallow, eat, or drink normally and has been chewing abnormally/on one side. He reports when he would brush his teeth and have water in his mouth, the water would leak out the right corner of his mouth.  -Decreased appetite        History:   Is it seasonal: unsure   History of Asthma: no  Has allergy testing been done: no    Precipitating factors:   None    Alleviating factors:  None       Therapies Tried and outcome: aspirin, no improvement      Past medical, family, and social histories, medications, and allergies are reviewed and updated in Epic.     ROS:  C: NEGATIVE for fever, chills, change in weight  E/M: NEGATIVE for ear, mouth and throat problems  R: NEGATIVE for significant cough or SOB  CV: NEGATIVE for chest pain, palpitations or peripheral edema  PSYCHIATRIC: Patient reports he is very nervous and scared about his symptoms.   ROS otherwise negative      Any history above obtained by the Medical Assistant was reviewed by Dr. Muarice Barroso MD, and edited when necessary.    This document serves as a record of the services and decisions personally performed and made by Dr. Barroso. It was created on his behalf by Raina Johns, a trained medical scribe. The creation of this document is based on the provider's statements to the  "medical scribe.  Raina Johns May 11, 2017 5:53 PM   OBJECTIVE:                                                    /84 (BP Location: Left arm, Patient Position: Chair, Cuff Size: Adult Regular)  Pulse 93  Temp 98.4  F (36.9  C) (Oral)  Ht 5' 7\" (1.702 m)  Wt 188 lb (85.3 kg)  SpO2 96%  BMI 29.44 kg/m2  Body mass index is 29.44 kg/(m^2).  GENERAL: healthy, alert and no distress  EYES: Eyes grossly normal to inspection, PERRL and conjunctivae and sclerae normal  HENT: ear canals and TM's normal, nose and mouth without ulcers or lesions  RESP: lungs clear to auscultation - no rales, rhonchi or wheezes  CV: regular rate and rhythm, normal S1 S2, no S3 or S4, no murmur, click or rub, no peripheral edema and peripheral pulses strong  MS: no gross musculoskeletal defects noted, no edema  SKIN: no suspicious lesions or rashes  NEURO: Normal strength and tone, weakness of right facial muscles, sensory exam grossly normal, mentation intact, speech normal, cranial nerves 2-12 intact except for testing of right facial muscles (closing eye tight, smile is asymmetrical, unable to puff out cheeks because of right sided leak), DTR's normal and symmetric, gait normal including heel/toe/tandem walking and Romberg normal  PSYCH: mentation appears normal, affect normal/bright      Vision screen normal and hearing screen normal (see nursing notes for details).     ASSESSMENT/PLAN:                                                    (G51.0) Bell's palsy  (primary encounter diagnosis)  Comment: right sided, moderate (or perhaps moderately-severe) dysfunction, he has risk factors for CVA so I still want imaging, but I expect it to be normal.  Plan: predniSONE (DELTASONE) 20 MG tablet,         valACYclovir (VALTREX) 1000 mg tablet,         OPTOMETRY REFERRAL, MR Brain w/o Contrast        Recheck in 3 weeks. Handouts provided.       (Z12.11) Screen for colon cancer  Comment:   Plan: GASTROENTEROLOGY ADULT REF PROCEDURE ONLY,    "      Fecal colorectal cancer screen FIT - Future         (S+30)              The information in this document, created by the medical scribe for me, accurately reflects the services I personally performed and the decisions made by me. I have reviewed and approved this document for accuracy prior to leaving the patient care area.    Maurice Barroso MD

## 2017-05-11 NOTE — NURSING NOTE
"Chief Complaint   Patient presents with     Allergies       Initial /84 (BP Location: Left arm, Patient Position: Chair, Cuff Size: Adult Regular)  Pulse 93  Temp 98.4  F (36.9  C) (Oral)  Ht 5' 7\" (1.702 m)  Wt 188 lb (85.3 kg)  SpO2 96%  BMI 29.44 kg/m2 Estimated body mass index is 29.44 kg/(m^2) as calculated from the following:    Height as of this encounter: 5' 7\" (1.702 m).    Weight as of this encounter: 188 lb (85.3 kg).  Medication Reconciliation: complete     Gurwinder Truong MA      "

## 2017-05-11 NOTE — NURSING NOTE
VISION:  Right eye: 20/20  Left eye: 20/30  Right & Left eyes: 20/20      HEARING FREQUENCY:   Right Ear:  500 Hz: 25 db HL   1000 Hz: 20 db HL   2000 Hz: 20 db HL   4000 Hz: 20 db HL  Left Ear:  500 Hz: 25 db HL   1000 Hz: 20 db HL   2000 Hz: 20 db HL   4000 Hz: 20 db HL    Gurwinder Truong MA

## 2017-05-11 NOTE — MR AVS SNAPSHOT
After Visit Summary   5/11/2017    Jose Alejandro Deras    MRN: [id number]           Patient Information     Date Of Birth          1965        Visit Information        Provider Department      5/11/2017 5:40 PM Maurice Barroso MD [facility]        Today's Diagnoses     Bell's palsy    -  1    Screen for colon cancer          Care Instructions    This summary includes the important diagnoses, test, medications and other important parts of your medical history.  Below are a few good we sites you can use to learn more about these.     Www.[employer].org : Up to date and easily searchable information on multiple topics.  Www.[employer]/Pharmacy/c_539084.asp : [facility] $4.99 medications  Www.[employer].gov : medication info, interactive tutorials, watch real surgeries online  Www.familydoctor.org : good info from the Academy of Family Physicians  Www.[employer] : good info from the [facility]  Www.cdc.gov : public health info, travel advisories, epidemics (H1N1)  Www.aap.org : children's health info, normal development, vaccinations  Www.health.state.[place].us : [place] dept of heatlh, public health issues in [place], N1N1    Based on your medical history and these are the current health maintenance or preventive care services that you are due for (some may have been done at this visit:)  Health Maintenance Due   Topic Date Due     FIT Q1 YR (NO INBASKET)  04/25/2017     =================================================================================  Normal Values   Blood pressure  <140/90 for most adults    <130/80 for some chronic diseases (ask your care team about yours)    BMI (body mass index)  18.5-25 kg/m2 (based on height and weight)     Thank you for visiting [facility]    Normal or non-critical lab and imaging results will be communicated to you by MyChart, letter or phone within 7 days.  If you do not hear from us within 10 days, please call the  clinic. If you have a critical or abnormal lab result, we will notify you by phone as soon as possible.     If you have any questions regarding your visit please contact:     Team Comfort:   Clinic Hours Telephone Number   Dr. Maurice Gudino  [facility]   7am-5pm  Monday - Friday [phone]  [facility]   Pharmacy 8am-8pm Monday-Thursday      8am-6pm Friday  9am-5pm Saturday- [phone]   Urgent Care 11am-8pm Monday-Friday        9am-5pm Saturday- [phone]     After hours, weekend or if you need to make an appointment with your primary provider please call [phone].   After Hours nurse advise: call [facility]: [phone]    Medication Refills:  Call your pharmacy and they will forward the refill to us. Please allow 3 business days for your refills to be completed.    Use [employer] (secure email communication and access to your chart) to send your primary care provider a message or make an appointment. Ask someone on your Team how to sign up for [employer]. To log on to [employer] or for more information in [employer] please visit the website at [email or web address].  As of 2013, all password changes, disabled accounts, or ID changes in [employer]/MyHealth will be done by our Access Services Department.   If you need help with your account or password, call: [phone]. Clinic staff no longer has the ability to change passwords.         Please call [facility]/[phone] to schedule your brain MRI.     Bell s Palsy  Bell's Palsy is a problem involving the nerve that controls the muscles on one side of the face.    The cause is unknown, but may be related to inflammation of the nerve. Symptoms usually appear only on the affected side. They may include:    Inability to close the eyelid    Tearing of the eye    Facial drooping    Drooling    Numbness or pain    Changes in  taste    Sound sensitivity  Damage to the eye can be a serious problem. The inability to blink can cause the eye to dry out. An ulcer (sore) can then form on the cornea. Also, not blinking means that the eye has no protection from dirt and dust particles.   Treatment involves protecting and moistening the eye. Medicines may also help.  Most persons recover completely within 3 to 6 months. However, the condition sometimes returns months or years later.  Home care    Get plenty of rest and eat a healthy diet to help yourself recover.    Use Artificial Tears frequently during the day and at bedtime to prevent drying. These drops are available without prescription at your drug store.    Wear protective glasses especially when outside to protect from flying debris. Use sunglasses when outdoors.    Tape the eyelid closed at bedtime with a paper tape (available at your pharmacy). This has a very mild adhesive to avoid injury to the lid. This will protect your eye from injury while you sleep.    Sometimes medicines are prescribed to reduce inflammation or treat specific viral infections of the nerve. If medicines are prescribed, take them exactly as directed.  Usually there is a 10-day course of medication that is started as soon as possible.  Taking this medicine as prescribed will help with a full recovery.      Use low heat, for example from a heating pad, on the affected area.  This can help reduce pain and swelling.      If you are experiencing sever pain, contact your health care provider.  Follow-up care  Follow up with your healthcare provider as advised. If you referred to a specialist, make that appointment promptly.  When to seek medical advice  Call your healthcare provider if any of the following occur:    Severe eye redness    Eye pain    Thick drainage from the eye    Change in vision (such as double vision or losing vision)    Fever over 100.4 F (38 C) or as directed by your healthcare provider    Headache,  neck pain, weakness, trouble speaking or walking, or other unexplained symptoms    5889-6361 [employer]. [address] [place] [address], [place] [address]. All rights reserved. This information is not intended as a substitute for professional medical care. Always follow your healthcare professional's instructions.        Bell s Palsy  Bell s palsy is a nerve disorder that usually happens suddenly and without warning. This condition happens when a nerve that controls facial movement is damaged. Nerve damage can happen for many reasons. But most cases of Bell s palsy are probably caused by a virus.  Symptoms of Bell s palsy  Here are signs of the disorder:     Mild weakness to total paralysis of one side of your face    Drooping mouth, drooling on one side of mouth    Trouble closing one eye    Noises seeming louder than usual    Change in your sense of taste  When to go to the emergency department (ED)  There are conditions, such as stroke, that may look like Bell's palsy and are medical emergencies. Therefore, you should seek emergent medical care if you notice facial weakness or drooping. Although Bell s palsy can be alarming, it s rarely serious. Many people begin to improve in about 2 weeks, even without treatment. It is important to be seen as soon as possible. Most research shows that treatment with beneficial results was received within the first few days of symptoms.   Treatment  To treat Bell s palsy, you may be given steroid medicines. This helps reduce swelling of the affected nerve. In some cases, your healthcare provider may prescribe an antiviral medicine. Your open eye may be covered with a patch to prevent it from drying out. You also may need to use eye drops and ointments for a time. Your healthcare provider will discuss follow-up care with you, including the possible need for further treatment to help your facial muscles return to normal.    0315-8298 [employer]. [address] [place] [address]  [place] [address]. All rights reserved. This information is not intended as a substitute for professional medical care. Always follow your healthcare professional's instructions.              Follow-ups after your visit        Additional Services     GASTROENTEROLOGY ADULT REF PROCEDURE ONLY       Last Lab Result: Creatinine (mg/dL)       Date                     Value                 02/07/2017               0.96             ----------  Body mass index is 29.44 kg/(m^2).     Needed:  No  Language:  English    Patient will be contacted to schedule procedure.     Please be aware that coverage of these services is subject to the terms and limitations of your health insurance plan.  Call member services at your health plan with any benefit or coverage questions.  Any procedures must be performed at a [facility] facility OR coordinated by your clinic's referral office.    Please bring the following with you to your appointment:    (1) Any X-Rays, CTs or MRIs which have been performed.  Contact the facility where they were done to arrange for  prior to your scheduled appointment.    (2) List of current medications   (3) This referral request   (4) Any documents/labs given to you for this referral            OPTOMETRY REFERRAL       Agustin Eagle, Martina Cooney et al., optometrists  [place] [facility]  [phone]                  Follow-up notes from your care team     Return in about 3 weeks (around 6/1/2017) for recheck Bell's palsy.      Future tests that were ordered for you today     Open Future Orders        Priority Expected Expires Ordered    MR Brain w/o Contrast Routine  5/11/2018 5/11/2017    Fecal colorectal cancer screen FIT - Future (S+30) Routine 6/1/2017 6/10/2017 5/11/2017            Who to contact     If you have questions or need follow up information about today's clinic visit or your schedule please contact [facility]  "directly at [phone].  Normal or non-critical lab and imaging results will be communicated to you by VSofthart, letter or phone within 4 business days after the clinic has received the results. If you do not hear from us within 7 days, please contact the clinic through [employer] or phone. If you have a critical or abnormal lab result, we will notify you by phone as soon as possible.  Submit refill requests through [employer] or call your pharmacy and they will forward the refill request to us. Please allow 3 business days for your refill to be completed.          Additional Information About Your Visit        [employer] Information     [employer] lets you send messages to your doctor, view your test results, renew your prescriptions, schedule appointments and more. To sign up, go to [email or web address] . Click on \"Log in\" on the left side of the screen, which will take you to the Welcome page. Then click on \"Sign up Now\" on the right side of the page.     You will be asked to enter the access code listed below, as well as some personal information. Please follow the directions to create your username and password.     Your access code is: [email or web address]  Expires: 2017  6:28 PM     Your access code will  in 90 days. If you need help or a new code, please call your [facility] clinic or [phone].        Care EveryWhere ID     This is your Care EveryWhere ID. This could be used by other organizations to access your [facility] medical records  [id number]        Your Vitals Were     Pulse Temperature Height Pulse Oximetry BMI (Body Mass Index)       93 98.4  F (36.9  C) (Oral) 5' 7\" (1.702 m) 96% 29.44 kg/m2        Blood Pressure from Last 3 Encounters:   17 132/84   17 130/77   06/10/16 118/69    Weight from Last 3 Encounters:   17 188 lb (85.3 kg)   02/15/17 195 lb (88.5 kg)   17 195 lb (88.5 kg)              We Performed the Following     GASTROENTEROLOGY ADULT REF PROCEDURE ONLY     " OPTOMETRY REFERRAL          Today's Medication Changes          These changes are accurate as of: 5/11/17  6:30 PM.  If you have any questions, ask your nurse or doctor.               Start taking these medicines.        Dose/Directions    predniSONE 20 MG tablet   Commonly known as:  DELTASONE   Used for:  Bell's palsy   Started by:  Maurice Barroso MD        Dose:  60 mg   Take 3 tablets (60 mg) by mouth daily with food for 7 days   Quantity:  21 tablet   Refills:  0       valACYclovir 1000 mg tablet   Commonly known as:  VALTREX   Used for:  Bell's palsy   Started by:  Maurice Barroso MD        Dose:  1000 mg   Take 1 tablet (1,000 mg) by mouth 3 times daily for 7 days   Quantity:  21 tablet   Refills:  0            Where to get your medicines      These medications were sent to [facility] - [place], [place] - [address] [facility] [address] [facility] [address]     Phone:  [phone]     predniSONE 20 MG tablet    valACYclovir 1000 mg tablet                Primary Care Provider Office Phone # Fax #    Maurice Barroso [phone]       [facility] [address]  [facility] [address]        Thank you!     Thank you for choosing [facility]  for your care. Our goal is always to provide you with excellent care. Hearing back from our patients is one way we can continue to improve our services. Please take a few minutes to complete the written survey that you may receive in the mail after your visit with us. Thank you!             Your Updated Medication List - Protect others around you: Learn how to safely use, store and throw away your medicines at www.disposemymeds.org.          This list is accurate as of: 5/11/17  6:30 PM.  Always use your most recent med list.                   Brand Name Dispense Instructions for use    chlorthalidone 25 MG tablet    HYGROTON    90 tablet    TAKE 1 TABLET(25 MG) BY  MOUTH DAILY FOR BLOOD PRESSURE       lisinopril 20 MG tablet    PRINIVIL/ZESTRIL    90 tablet    TAKE 1 TABLET(20 MG) BY MOUTH DAILY FOR BLOOD PRESSURE       MULTIPLE VITAMINS PO      Take by mouth daily       predniSONE 20 MG tablet    DELTASONE    21 tablet    Take 3 tablets (60 mg) by mouth daily with food for 7 days       simvastatin 40 MG tablet    ZOCOR    90 tablet    Take 1 tablet (40 mg) by mouth every evening for cholesterol.       sulindac 200 MG tablet    CLINORIL    60 tablet    Take 1 tablet (200 mg) by mouth 2 times daily (with meals)       valACYclovir 1000 mg tablet    VALTREX    21 tablet    Take 1 tablet (1,000 mg) by mouth 3 times daily for 7 days

## 2017-05-13 DIAGNOSIS — Z12.11 SCREEN FOR COLON CANCER: ICD-10-CM

## 2017-05-13 LAB — HEMOCCULT STL QL IA: NEGATIVE

## 2017-05-13 PROCEDURE — 82274 ASSAY TEST FOR BLOOD FECAL: CPT | Performed by: FAMILY MEDICINE

## 2017-05-19 ENCOUNTER — RADIANT APPOINTMENT (OUTPATIENT)
Dept: MRI IMAGING | Facility: CLINIC | Age: 52
End: 2017-05-19
Attending: FAMILY MEDICINE

## 2017-05-19 DIAGNOSIS — G51.0 BELL'S PALSY: ICD-10-CM

## 2017-05-19 PROCEDURE — 70553 MRI BRAIN STEM W/O & W/DYE: CPT | Performed by: RADIOLOGY

## 2017-05-19 PROCEDURE — A9585 GADOBUTROL INJECTION: HCPCS | Mod: JW | Performed by: RADIOLOGY

## 2017-05-19 RX ORDER — GADOBUTROL 604.72 MG/ML
10 INJECTION INTRAVENOUS ONCE
Status: COMPLETED | OUTPATIENT
Start: 2017-05-19 | End: 2017-05-19

## 2017-05-19 RX ADMIN — GADOBUTROL 8.5 ML: 604.72 INJECTION INTRAVENOUS at 16:49

## 2017-05-19 NOTE — LETTER
[facility]  [address] [address] [place] [address]  [phone]             June 2, 2017    Jose Alejandro Deras  [address]  [facility] [place] [address]            Mr. Deras,     Your MRI showed one small area of minimal abnormality consistent with Bell's Palsy, as I had explained it to you. No other abnormalities were seen. Enclosed are the results.  Results for orders placed or performed in visit on 05/19/17   MR Brain w/o Contrast    Narrative    MR BRAIN W/O CONTRAST 5/19/2017 5:11 PM    History: right sided Bell's Palsy, rule out brainstem lesion, Bell's  palsy.    Comparison: None    Technique: Axial diffusion, FLAIR, and susceptibility-weighted images  of the whole brain were obtained. Coronal 3D T2-weighted and axial  thin-section T1-weighted images were obtained without contrast, with  focus on the internal auditory canals.  Post-intravenous contrast  (using gadolinium) axial and coronal thin-section T1-weighted images  with fat saturation were also obtained, with focus on the internal  auditory canals, with postcontrast T1-weighted images of the whole  brain as well.    Contrast: 8.5 ml gadavist    Findings: There is linear abnormnal enhancement within the right  internal auditory canal near the fundus, anterosuperiorly along the  7th and 8th nerve complex. Left facial nerve appears normal.    No definite abnormal signal is present in the cerebellum or brainstem.  The labyrinthine structures are grossly unremarkable on the  T2-weighted images.  The ventricles appear normal in size for the  patient's age.    Postcontrast images demonstrate no definite abnormal enhancement along  the seventh or eighth cranial nerves along their course or of the  labyrinthine structures. No definite abnormal enhancement is  visualized elsewhere intracranially.      Impression    Impression:  1. Abnormal linear enhancement within the right internal auditory  canal near the fundus. This finding  goes along with the clinical  diagnosis of Bell's palsy.   2. No definite abnormality of the other visualized infratentorial  structures or supratentorial brain.    I have personally reviewed the examination and initial interpretation  and I agree with the findings.    GRACY CARBALLO MD       Please contact the clinic if you have additional questions.  Thank you.     Sincerely,       Maurice Barroso MD/burak

## 2017-05-26 ENCOUNTER — TELEPHONE (OUTPATIENT)
Dept: FAMILY MEDICINE | Facility: CLINIC | Age: 52
End: 2017-05-26

## 2017-05-26 NOTE — TELEPHONE ENCOUNTER
..Reason for Call:  Request for results:    Name of test or procedure: MRI    Date of test of procedure: 05-19    Location of the test or procedure: [facility]    OK to leave the result message on voice mail or with a family member? YES    Phone number Patient can be reached at:  Home number on file [phone] (home)     Additional comments: would like results please, requesting call today    Call taken on 5/26/2017 at 11:43 AM by Becka Brooks

## 2017-05-31 ENCOUNTER — OFFICE VISIT (OUTPATIENT)
Dept: OPTOMETRY | Facility: CLINIC | Age: 52
End: 2017-05-31

## 2017-05-31 DIAGNOSIS — G51.0 BELL'S PALSY: ICD-10-CM

## 2017-05-31 DIAGNOSIS — H52.03 HYPEROPIA WITH ASTIGMATISM AND PRESBYOPIA, BILATERAL: Primary | ICD-10-CM

## 2017-05-31 DIAGNOSIS — H52.4 HYPEROPIA WITH ASTIGMATISM AND PRESBYOPIA, BILATERAL: Primary | ICD-10-CM

## 2017-05-31 DIAGNOSIS — H52.203 HYPEROPIA WITH ASTIGMATISM AND PRESBYOPIA, BILATERAL: Primary | ICD-10-CM

## 2017-05-31 DIAGNOSIS — H04.201 EPIPHORA, RIGHT: ICD-10-CM

## 2017-05-31 PROCEDURE — 92015 DETERMINE REFRACTIVE STATE: CPT | Performed by: OPTOMETRIST

## 2017-05-31 PROCEDURE — 92004 COMPRE OPH EXAM NEW PT 1/>: CPT | Performed by: OPTOMETRIST

## 2017-05-31 ASSESSMENT — VISUAL ACUITY
OS_SC: 20/80
OD_SC: 20/30
OD_SC: 20/80
METHOD: SNELLEN - LINEAR
OD_SC+: -1
OS_SC: 20/30

## 2017-05-31 ASSESSMENT — REFRACTION_MANIFEST
OD_AXIS: 164
OS_CYLINDER: +0.25
OS_CYLINDER: +0.50
OD_ADD: +1.50
OD_CYLINDER: +0.25
OS_ADD: +1.50
OD_CYLINDER: +0.50
OD_AXIS: 170
OD_SPHERE: +0.25
OS_SPHERE: +0.25
METHOD_AUTOREFRACTION: 1
OS_AXIS: 005
OS_AXIS: 008
OS_SPHERE: +0.50
OD_SPHERE: +0.50

## 2017-05-31 ASSESSMENT — EXTERNAL EXAM - LEFT EYE: OS_EXAM: NORMAL

## 2017-05-31 ASSESSMENT — TONOMETRY
OS_IOP_MMHG: 18
IOP_METHOD: APPLANATION
OD_IOP_MMHG: 16

## 2017-05-31 ASSESSMENT — CONF VISUAL FIELD
OS_NORMAL: 1
METHOD: COUNTING FINGERS
OD_NORMAL: 1

## 2017-05-31 ASSESSMENT — SLIT LAMP EXAM - LIDS: COMMENTS: NORMAL

## 2017-05-31 ASSESSMENT — CUP TO DISC RATIO
OD_RATIO: 0.45
OS_RATIO: 0.45

## 2017-05-31 ASSESSMENT — EXTERNAL EXAM - RIGHT EYE: OD_EXAM: NORMAL

## 2017-05-31 NOTE — MR AVS SNAPSHOT
After Visit Summary   5/31/2017    Jose Alejandro Deras    MRN: [id number]           Patient Information     Date Of Birth          1965        Visit Information        Provider Department      5/31/2017 4:30 PM Martina Cooney OD [facility]        Today's Diagnoses     Hyperopia with astigmatism and presbyopia, bilateral    -  1    Epiphora, right        Bell's palsy          Care Instructions    Incomplete lid closure on blink is not activating natural tear pump into the nose. Advised forceful lid closure every few minutes and artifical tears and ointment for the right eye.          Follow-ups after your visit        Follow-up notes from your care team     Return in about 2 months (around 7/31/2017).      Your next 10 appointments already scheduled     Jun 02, 2017  4:40 PM CDT   Office Visit with Maurice Barroso MD   [facility]    [address] [place] [id number] [phone]           Bring a current list of meds and any records pertaining to this visit.  For Physicals, please bring immunization records and any forms needing to be filled out.  Please arrive 10 minutes early to complete paperwork.              Who to contact     If you have questions or need follow up information about today's clinic visit or your schedule please contact [facility] directly at [phone].  Normal or non-critical lab and imaging results will be communicated to you by MyChart, letter or phone within 4 business days after the clinic has received the results. If you do not hear from us within 7 days, please contact the clinic through MyChart or phone. If you have a critical or abnormal lab result, we will notify you by phone as soon as possible.  Submit refill requests through YouEarnedIt or call your pharmacy and they will forward the refill request to us. Please allow 3 business days for your refill  "to be completed.          Additional Information About Your Visit        LuminescentharReadyDock Information     [place] lets you send messages to your doctor, view your test results, renew your prescriptions, schedule appointments and more. To sign up, go to [email or web address] . Click on \"Log in\" on the left side of the screen, which will take you to the Welcome page. Then click on \"Sign up Now\" on the right side of the page.     You will be asked to enter the access code listed below, as well as some personal information. Please follow the directions to create your username and password.     Your access code is: [email or web address]  Expires: 2017  6:28 PM     Your access code will  in 90 days. If you need help or a new code, please call your [place] clinic or [phone].        Care EveryWhere ID     This is your Care EveryWhere ID. This could be used by other organizations to access your [place] medical records  [id number]         Blood Pressure from Last 3 Encounters:   17 132/84   17 130/77   06/10/16 118/69    Weight from Last 3 Encounters:   17 85.3 kg (188 lb)   02/15/17 88.5 kg (195 lb)   17 88.5 kg (195 lb)              We Performed the Following     EYE EXAM (SIMPLE-NONBILLABLE)     REFRACTION          Today's Medication Changes          These changes are accurate as of: 17 11:59 PM.  If you have any questions, ask your nurse or doctor.               Start taking these medicines.        Dose/Directions    polyvinyl alcohol 1.4 % ophthalmic solution   Commonly known as:  ARTIFICIAL TEARS   Used for:  Epiphora, right, Bell's palsy   Started by:  Martina Cooney, OD        Dose:  1 drop   Place 1 drop into the right eye 4 times daily   Quantity:  6 mL   Refills:  11       REFRESH P.M. Oint   Used for:  Bell's palsy, Epiphora, right   Started by:  Martina Cooney, OD        Dose:  1 Tube   Apply 3.5 g to eye At Bedtime   Quantity:  3.5 g   Refills:  11            Where to get " your medicines      These medications were sent to [facility] - [facility], [place] - [address] AT [facility] [address], [facility] [address]     Phone:  [phone]     polyvinyl alcohol 1.4 % ophthalmic solution    REFRESH P.M. Oint                Primary Care Provider Office Phone # Fax #    Maurice Barroso [phone]       [facility] [address]  [facility] [address]        Thank you!     Thank you for choosing [facility]  for your care. Our goal is always to provide you with excellent care. Hearing back from our patients is one way we can continue to improve our services. Please take a few minutes to complete the written survey that you may receive in the mail after your visit with us. Thank you!             Your Updated Medication List - Protect others around you: Learn how to safely use, store and throw away your medicines at www.disposemymeds.org.          This list is accurate as of: 5/31/17 11:59 PM.  Always use your most recent med list.                   Brand Name Dispense Instructions for use    chlorthalidone 25 MG tablet    HYGROTON    90 tablet    TAKE 1 TABLET(25 MG) BY MOUTH DAILY FOR BLOOD PRESSURE       lisinopril 20 MG tablet    PRINIVIL/ZESTRIL    90 tablet    TAKE 1 TABLET(20 MG) BY MOUTH DAILY FOR BLOOD PRESSURE       MULTIPLE VITAMINS PO      Take by mouth daily       polyvinyl alcohol 1.4 % ophthalmic solution    ARTIFICIAL TEARS    6 mL    Place 1 drop into the right eye 4 times daily       REFRESH P.M. Oint     3.5 g    Apply 3.5 g to eye At Bedtime       simvastatin 40 MG tablet    ZOCOR    90 tablet    Take 1 tablet (40 mg) by mouth every evening for cholesterol.       sulindac 200 MG tablet    CLINORIL    60 tablet    Take 1 tablet (200 mg) by mouth 2 times daily (with meals)       valACYclovir 1000 mg tablet    VALTREX    21 tablet    Take 1 tablet (1,000 mg) by mouth 3 times  daily for 7 days

## 2017-05-31 NOTE — PROGRESS NOTES
Chief Complaint   Patient presents with     COMPREHENSIVE EYE EXAM         Last Eye Exam: 1 year  Dilated Previously: Yes    What are you currently using to see?  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  Pt cannot close right eye tightly and is watery.  MRI was taken last week.     Distance Vision Acuity: Satisfied with vision    Near Vision Acuity: Satisfied with vision while reading  Unaided - sometimes will grab wife's glasses to help with close up    Eye Comfort: see note above  Do you use eye drops? 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       OBJECTIVE: See Ophthalmology exam    ASSESSMENT:    ICD-10-CM    1. Hyperopia with astigmatism and presbyopia, bilateral H52.03 EYE EXAM (SIMPLE-NONBILLABLE)    H52.203 REFRACTION   2. Epiphora, right H04.201 EYE EXAM (SIMPLE-NONBILLABLE)     polyvinyl alcohol (ARTIFICIAL TEARS) 1.4 % ophthalmic solution     Artificial Tear Ointment (REFRESH P.M.) OINT   3. Bell's palsy G51.0 EYE EXAM (SIMPLE-NONBILLABLE)     polyvinyl alcohol (ARTIFICIAL TEARS) 1.4 % ophthalmic solution     Artificial Tear Ointment (REFRESH P.M.) OINT      PLAN:     Patient Instructions   Incomplete lid closure on blink is not activating natural tear pump into the nose. Advised forceful lid closure every few minutes and artifical tears and ointment for the right eye.

## 2017-06-01 RX ORDER — POLYVINYL ALCOHOL 14 MG/ML
1 SOLUTION/ DROPS OPHTHALMIC 4 TIMES DAILY
Qty: 6 ML | Refills: 11 | Status: SHIPPED | OUTPATIENT
Start: 2017-06-01 | End: 2018-06-01

## 2017-06-01 RX ORDER — MINERAL OIL, PETROLATUM 425; 573 MG/G; MG/G
1 OINTMENT OPHTHALMIC AT BEDTIME
Qty: 3.5 G | Refills: 11 | Status: SHIPPED | OUTPATIENT
Start: 2017-06-01 | End: 2018-06-01

## 2017-06-01 NOTE — PATIENT INSTRUCTIONS
Incomplete lid closure on blink is not activating natural tear pump into the nose. Advised forceful lid closure every few minutes and artifical tears and ointment for the right eye.

## 2017-06-01 NOTE — TELEPHONE ENCOUNTER
Called to let Jose Alejandro know that I put a prescription through for artifical tears to use 4 times a day in the right eye and an Ointment to use in the right eye every night before bed. Call back if any concerns. Left optical phone number [phone].    Martina Cooney O.D.

## 2017-06-02 ENCOUNTER — OFFICE VISIT (OUTPATIENT)
Dept: FAMILY MEDICINE | Facility: CLINIC | Age: 52
End: 2017-06-02

## 2017-06-02 VITALS
WEIGHT: 194 LBS | HEART RATE: 92 BPM | OXYGEN SATURATION: 97 % | DIASTOLIC BLOOD PRESSURE: 84 MMHG | TEMPERATURE: 99.2 F | BODY MASS INDEX: 30.38 KG/M2 | SYSTOLIC BLOOD PRESSURE: 136 MMHG

## 2017-06-02 DIAGNOSIS — G51.0 BELL'S PALSY: Primary | ICD-10-CM

## 2017-06-02 PROCEDURE — 99214 OFFICE O/P EST MOD 30 MIN: CPT | Performed by: FAMILY MEDICINE

## 2017-06-02 NOTE — PROGRESS NOTES
SUBJECTIVE:                                                    Jose Alejandro Deras is a 52 year old male who presents to clinic today for the following health issues:    Follow up from MRI  He did have an eye exam completed since last week.   He feels as though his mouth is getting better, but is not at 100%.   His eyes are getting to the point where he can close them, and he was told by his eye doctor that his tear duct was overactive. He has noticed that his eyes today have not been as bad as they were in the past. He was given a prescription for ointment and artificial tear drops.   He noticed that he isn't having tears running as much as he did in the past.     ROS:  Constitutional, HEENT, cardiovascular, pulmonary, GI, , musculoskeletal, neuro, skin, endocrine and psych systems are negative, except as otherwise noted.    Any history above obtained by the Medical Assistant was reviewed by Dr. Maurice Barroso MD, and edited when necessary.     This document serves as a record of the services and decisions personally performed and made by Dr. Barroso. It was created on his behalf by Keaton Boyd, a trained medical scribe. The creation of this document is based on the provider's statements to the medical scribe.    Keaton Boyd 5:11 PM    OBJECTIVE:                                                    /84 (BP Location: Left arm, Patient Position: Chair, Cuff Size: Adult Regular)  Pulse 92  Temp 99.2  F (37.3  C) (Oral)  Wt 88 kg (194 lb)  SpO2 97%  BMI 30.38 kg/m2  Body mass index is 30.38 kg/(m^2).  GENERAL: healthy, alert and no distress  EYES: Eyes grossly normal to inspection, PERRL, EOMI, sclerae white and conjunctivae normal  MS: no gross musculoskeletal defects noted, no edema  SKIN: no suspicious lesions or rashes  NEURO: Normal strength and tone, sensory exam grossly normal, mentation intact, oriented times 3 and cranial nerves 2-12 intact except for subtle facial droop on the right  (corner of the mouth and eyelid movement; I don't see a symmetry in his forehead movement)  PSYCH: mentation appears normal, affect normal/bright    Diagnostic Test Results:  MRI brain w/o contrast 05/19/17  Impression:  1. Abnormal linear enhancement within the right internal auditory  canal near the fundus. This finding goes along with the clinical  diagnosis of Bell's palsy.   2. No definite abnormality of the other visualized infratentorial  structures or supratentorial brain.     ASSESSMENT/PLAN:                                                      (G51.0) Bell's palsy  (primary encounter diagnosis)  Comment: Reviewed MRI results with the patient. I am pleased that he has improved this much since his last visit with me.  Plan: I suggested that he shouldn't use the Visine anymore. I mentioned that we need to wait for the facial nerve to recover on its own. Suggested that he may have a permanent right sided facial droop. I suggested that drinking alcohol won't harm him, but his facial control may worsen if drunk.     The information in this document, created by the medical scribe for me, accurately reflects the services I personally performed and the decisions made by me.  I have reviewed and approved this document for accuracy prior to leaving the patient care area.    Maurice Barroso MD 5:05 PM June 2, 2017

## 2017-06-02 NOTE — MR AVS SNAPSHOT
After Visit Summary   6/2/2017    Jose Alejandro Deras    MRN: [id number]           Patient Information     Date Of Birth          1965        Visit Information        Provider Department      6/2/2017 4:40 PM Maurice Barroso MD [facility]        Today's Diagnoses     Bell's palsy    -  1      Care Instructions      Bell s Palsy  Bell's Palsy is a problem involving the nerve that controls the muscles on one side of the face.    The cause is unknown, but may be related to inflammation of the nerve. Symptoms usually appear only on the affected side. They may include:    Inability to close the eyelid    Tearing of the eye    Facial drooping    Drooling    Numbness or pain    Changes in taste    Sound sensitivity  Damage to the eye can be a serious problem. The inability to blink can cause the eye to dry out. An ulcer (sore) can then form on the cornea. Also, not blinking means that the eye has no protection from dirt and dust particles.   Treatment involves protecting and moistening the eye. Medicines may also help.  Most persons recover completely within 3 to 6 months. However, the condition sometimes returns months or years later.  Home care    Get plenty of rest and eat a healthy diet to help yourself recover.    Use Artificial Tears frequently during the day and at bedtime to prevent drying. These drops are available without prescription at your drug store.    Wear protective glasses especially when outside to protect from flying debris. Use sunglasses when outdoors.    Tape the eyelid closed at bedtime with a paper tape (available at your pharmacy). This has a very mild adhesive to avoid injury to the lid. This will protect your eye from injury while you sleep.    Sometimes medicines are prescribed to reduce inflammation or treat specific viral infections of the nerve. If medicines are prescribed, take them exactly as directed.  Usually there is a 10-day course of medication  that is started as soon as possible.  Taking this medicine as prescribed will help with a full recovery.      Use low heat, for example from a heating pad, on the affected area.  This can help reduce pain and swelling.      If you are experiencing sever pain, contact your health care provider.  Follow-up care  Follow up with your healthcare provider as advised. If you referred to a specialist, make that appointment promptly.  When to seek medical advice  Call your healthcare provider if any of the following occur:    Severe eye redness    Eye pain    Thick drainage from the eye    Change in vision (such as double vision or losing vision)    Fever over 100.4 F (38 C) or as directed by your healthcare provider    Headache, neck pain, weakness, trouble speaking or walking, or other unexplained symptoms    5430-8042 [employer]. [address] [place] [address], [place] [address]. All rights reserved. This information is not intended as a substitute for professional medical care. Always follow your healthcare professional's instructions.                Follow-ups after your visit        Who to contact     If you have questions or need follow up information about today's clinic visit or your schedule please contact [facility] directly at [phone].  Normal or non-critical lab and imaging results will be communicated to you by MyChart, letter or phone within 4 business days after the clinic has received the results. If you do not hear from us within 7 days, please contact the clinic through Beabloohart or phone. If you have a critical or abnormal lab result, we will notify you by phone as soon as possible.  Submit refill requests through ShipServ or call your pharmacy and they will forward the refill request to us. Please allow 3 business days for your refill to be completed.          Additional Information About Your Visit        ShipServ Information     S[employer]Serv lets you send messages to your doctor,  "view your test results, renew your prescriptions, schedule appointments and more. To sign up, go to [email or web address] . Click on \"Log in\" on the left side of the screen, which will take you to the Welcome page. Then click on \"Sign up Now\" on the right side of the page.     You will be asked to enter the access code listed below, as well as some personal information. Please follow the directions to create your username and password.     Your access code is: [email or web address]  Expires: 2017  6:28 PM     Your access code will  in 90 days. If you need help or a new code, please call your [place] clinic or [phone].        Care EveryWhere ID     This is your Care EveryWhere ID. This could be used by other organizations to access your [place] medical records  [id number]        Your Vitals Were     Pulse Temperature Pulse Oximetry BMI (Body Mass Index)          92 99.2  F (37.3  C) (Oral) 97% 30.38 kg/m2         Blood Pressure from Last 3 Encounters:   17 136/84   17 132/84   17 130/77    Weight from Last 3 Encounters:   17 194 lb (88 kg)   17 188 lb (85.3 kg)   02/15/17 195 lb (88.5 kg)              Today, you had the following     No orders found for display       Primary Care Provider Office Phone # Fax #    Maurice Barroso [phone]       [facility] [address]  [facility] [address]        Thank you!     Thank you for choosing [facility]  for your care. Our goal is always to provide you with excellent care. Hearing back from our patients is one way we can continue to improve our services. Please take a few minutes to complete the written survey that you may receive in the mail after your visit with us. Thank you!             Your Updated Medication List - Protect others around you: Learn how to safely use, store and throw away your medicines at www.disposemymeds.org.          This list is accurate as of: 17 " 11:59 PM.  Always use your most recent med list.                   Brand Name Dispense Instructions for use    chlorthalidone 25 MG tablet    HYGROTON    90 tablet    TAKE 1 TABLET(25 MG) BY MOUTH DAILY FOR BLOOD PRESSURE       lisinopril 20 MG tablet    PRINIVIL/ZESTRIL    90 tablet    TAKE 1 TABLET(20 MG) BY MOUTH DAILY FOR BLOOD PRESSURE       MULTIPLE VITAMINS PO      Take by mouth daily       polyvinyl alcohol 1.4 % ophthalmic solution    ARTIFICIAL TEARS    6 mL    Place 1 drop into the right eye 4 times daily       REFRESH P.M. Oint     3.5 g    Apply 3.5 g to eye At Bedtime       simvastatin 40 MG tablet    ZOCOR    90 tablet    Take 1 tablet (40 mg) by mouth every evening for cholesterol.       sulindac 200 MG tablet    CLINORIL    60 tablet    Take 1 tablet (200 mg) by mouth 2 times daily (with meals)       valACYclovir 1000 mg tablet    VALTREX    21 tablet    Take 1 tablet (1,000 mg) by mouth 3 times daily for 7 days

## 2017-06-02 NOTE — TELEPHONE ENCOUNTER
This writer attempted to contact [facility] on 06/02/17    Reason for call MRI results and left message to return call.    When patient calls back, please Relay message MRI was normal, (read verbatim) .        Alexi Wilder

## 2017-06-02 NOTE — NURSING NOTE
"Chief Complaint   Patient presents with     Follow Up For     MRI       Initial /84 (BP Location: Left arm, Patient Position: Chair, Cuff Size: Adult Regular)  Pulse 92  Temp 99.2  F (37.3  C) (Oral)  Wt 194 lb (88 kg)  SpO2 97%  BMI 30.38 kg/m2 Estimated body mass index is 30.38 kg/(m^2) as calculated from the following:    Height as of 5/11/17: 5' 7\" (1.702 m).    Weight as of this encounter: 194 lb (88 kg).  Medication Reconciliation: complete       Sho Watson MA  4:57 PM 6/2/2017    "

## 2017-06-02 NOTE — PATIENT INSTRUCTIONS
Roy s Palsy  Bell's Palsy is a problem involving the nerve that controls the muscles on one side of the face.    The cause is unknown, but may be related to inflammation of the nerve. Symptoms usually appear only on the affected side. They may include:    Inability to close the eyelid    Tearing of the eye    Facial drooping    Drooling    Numbness or pain    Changes in taste    Sound sensitivity  Damage to the eye can be a serious problem. The inability to blink can cause the eye to dry out. An ulcer (sore) can then form on the cornea. Also, not blinking means that the eye has no protection from dirt and dust particles.   Treatment involves protecting and moistening the eye. Medicines may also help.  Most persons recover completely within 3 to 6 months. However, the condition sometimes returns months or years later.  Home care    Get plenty of rest and eat a healthy diet to help yourself recover.    Use Artificial Tears frequently during the day and at bedtime to prevent drying. These drops are available without prescription at your drug store.    Wear protective glasses especially when outside to protect from flying debris. Use sunglasses when outdoors.    Tape the eyelid closed at bedtime with a paper tape (available at your pharmacy). This has a very mild adhesive to avoid injury to the lid. This will protect your eye from injury while you sleep.    Sometimes medicines are prescribed to reduce inflammation or treat specific viral infections of the nerve. If medicines are prescribed, take them exactly as directed.  Usually there is a 10-day course of medication that is started as soon as possible.  Taking this medicine as prescribed will help with a full recovery.      Use low heat, for example from a heating pad, on the affected area.  This can help reduce pain and swelling.      If you are experiencing sever pain, contact your health care provider.  Follow-up care  Follow up with your healthcare provider as  advised. If you referred to a specialist, make that appointment promptly.  When to seek medical advice  Call your healthcare provider if any of the following occur:    Severe eye redness    Eye pain    Thick drainage from the eye    Change in vision (such as double vision or losing vision)    Fever over 100.4 F (38 C) or as directed by your healthcare provider    Headache, neck pain, weakness, trouble speaking or walking, or other unexplained symptoms    6781-1505 [employer]. [address] [place] [address], [place] [address]. All rights reserved. This information is not intended as a substitute for professional medical care. Always follow your healthcare professional's instructions.

## 2017-07-24 ENCOUNTER — TELEPHONE (OUTPATIENT)
Dept: FAMILY MEDICINE | Facility: CLINIC | Age: 52
End: 2017-07-24

## 2017-07-24 DIAGNOSIS — I10 ESSENTIAL HYPERTENSION, BENIGN: ICD-10-CM

## 2017-07-24 NOTE — LETTER
August 1, 2017      Jose Alejandro Deras  [address]  [facility] [address]        Dear Jose Alejandro Deras,      The refill request made by your pharmacy was received at the clinic.     Signed Prescriptions:                        Disp   Refills    lisinopril (PRINIVIL/ZESTRIL) 20 MG tablet 30 tab*0        Sig: TAKE 1 TABLET(20 MG) BY MOUTH DAILY FOR BLOOD           PRESSURE  Authorizing Provider: BERTA BARROSO  Ordering User: SWAPNA JON      At this time you are due in the clinic for a follow up appointment. A one time dami refill has been sent to your pharmacy.       Please call your clinic to make an appointment with your provider before you run out of medication. This will prevent a delay in your next month's refill.    [facility] [phone]    For your convenience we also offer online appointment scheduling at [email or web address] or [email or web address].     We invite you to refill your next prescription at a [facility] or take advantage of our prescription mail service.      Sincerely,      Team Comfort with Dr. Barroso/burak

## 2017-07-24 NOTE — TELEPHONE ENCOUNTER
lisinopril (PRINIVIL,ZESTRIL) 20 MG tablet      Last Written Prescription Date: 11/10/17  Last Fill Quantity: 90, # refills:1  Last Office Visit with G, P or [facility] prescribing provider: 6/2/17       Potassium   Date Value Ref Range Status   02/07/2017 3.9 3.4 - 5.3 mmol/L Final     Creatinine   Date Value Ref Range Status   02/07/2017 0.96 0.66 - 1.25 mg/dL Final     BP Readings from Last 3 Encounters:   06/02/17 136/84   05/11/17 132/84   02/07/17 130/77         Katarzyna Lim  [facility]

## 2017-07-26 NOTE — TELEPHONE ENCOUNTER
Reason for Call:  Other prescription    Detailed comments: Jose Alejandro called to follow up on refill request for Lisinopril.   States he has been out of medication since last week and corey needs his medication     Phone Number Patient can be reached at: Home number on file [phone] (home)    Best Time: Any    Can we leave a detailed message on this number? YES    Call taken on 7/26/2017 at 5:08 PM by Rocio Haider

## 2017-07-27 RX ORDER — LISINOPRIL 20 MG/1
TABLET ORAL
Qty: 30 TABLET | Refills: 0 | Status: SHIPPED | OUTPATIENT
Start: 2017-07-27 | End: 2017-08-14

## 2017-07-27 NOTE — TELEPHONE ENCOUNTER
Team, please call patient and get him scheduled.   Patient needs to be seen because:  Not current on HTN follow up visit or annual physical.     Medication is being filled for 1 time refill only due to:  Patient needs to be seen because needs office visit for future refills.     Liane Lomeli RN

## 2017-07-27 NOTE — TELEPHONE ENCOUNTER
This writer attempted to contact [facility] on 07/27/17      Reason for call schedule appointment and left message to return call.      When patient calls back, please schedule an appointment for hypertension, and route to team.          Gurwinder Truong, CMA

## 2017-07-31 NOTE — TELEPHONE ENCOUNTER
This writer attempted to contact Jose Alejandro on 07/31/17      Reason for call pt needs to schedule an appt to recheck BP and medication refills and left message to return call.      When patient calls back, please schedule Office Visit appointment within 2 business days with PCP, document that pt called and route to the care team.          Vinay Camacho

## 2017-08-01 NOTE — TELEPHONE ENCOUNTER
No call back, no appt made, letter mailed to pt's home address.  Vinay Camacho,  For Teams Comfort and Heart

## 2017-08-12 DIAGNOSIS — I10 ESSENTIAL HYPERTENSION, BENIGN: ICD-10-CM

## 2017-08-12 NOTE — TELEPHONE ENCOUNTER
chlorthalidone (HYGROTON) 25 MG tablet      Last Written Prescription Date: 11/10/16  Last Fill Quantity: 90, # refills: 1  Last Office Visit with G, [facility] or [facility] prescribing provider: 6/2/17  Next 5 appointments (look out 90 days)     Aug 14, 2017  2:40 PM CDT   Office Visit with Maurice Barroso MD   [facility] ([facility])    [address] [place] [address] [id number] [phone]                   Potassium   Date Value Ref Range Status   02/07/2017 3.9 3.4 - 5.3 mmol/L Final     Creatinine   Date Value Ref Range Status   02/07/2017 0.96 0.66 - 1.25 mg/dL Final     BP Readings from Last 3 Encounters:   06/02/17 136/84   05/11/17 132/84   02/07/17 130/77       Katarzyna Lim  [facility]

## 2017-08-14 ENCOUNTER — OFFICE VISIT (OUTPATIENT)
Dept: FAMILY MEDICINE | Facility: CLINIC | Age: 52
End: 2017-08-14

## 2017-08-14 VITALS
OXYGEN SATURATION: 98 % | BODY MASS INDEX: 31.39 KG/M2 | HEIGHT: 67 IN | TEMPERATURE: 98.7 F | DIASTOLIC BLOOD PRESSURE: 77 MMHG | WEIGHT: 200 LBS | HEART RATE: 87 BPM | SYSTOLIC BLOOD PRESSURE: 126 MMHG

## 2017-08-14 DIAGNOSIS — E55.9 VITAMIN D DEFICIENCY: ICD-10-CM

## 2017-08-14 DIAGNOSIS — R73.01 IMPAIRED FASTING GLUCOSE: ICD-10-CM

## 2017-08-14 DIAGNOSIS — E78.5 HYPERLIPIDEMIA LDL GOAL <130: ICD-10-CM

## 2017-08-14 DIAGNOSIS — I10 ESSENTIAL HYPERTENSION WITH GOAL BLOOD PRESSURE LESS THAN 140/90: Primary | ICD-10-CM

## 2017-08-14 LAB
ALT SERPL W P-5'-P-CCNC: 28 U/L (ref 0–70)
CHOLEST SERPL-MCNC: 179 MG/DL
CREAT SERPL-MCNC: 0.95 MG/DL (ref 0.66–1.25)
GFR SERPL CREATININE-BSD FRML MDRD: 82 ML/MIN/1.7M2
GLUCOSE SERPL-MCNC: 103 MG/DL (ref 70–99)
HBA1C MFR BLD: 5.7 % (ref 4.3–6)
HDLC SERPL-MCNC: 44 MG/DL
LDLC SERPL CALC-MCNC: 65 MG/DL
NONHDLC SERPL-MCNC: 135 MG/DL
POTASSIUM SERPL-SCNC: 4 MMOL/L (ref 3.4–5.3)
TRIGL SERPL-MCNC: 349 MG/DL

## 2017-08-14 PROCEDURE — 83036 HEMOGLOBIN GLYCOSYLATED A1C: CPT | Performed by: FAMILY MEDICINE

## 2017-08-14 PROCEDURE — 99214 OFFICE O/P EST MOD 30 MIN: CPT | Performed by: FAMILY MEDICINE

## 2017-08-14 PROCEDURE — 80061 LIPID PANEL: CPT | Performed by: FAMILY MEDICINE

## 2017-08-14 PROCEDURE — 82947 ASSAY GLUCOSE BLOOD QUANT: CPT | Performed by: FAMILY MEDICINE

## 2017-08-14 PROCEDURE — 84460 ALANINE AMINO (ALT) (SGPT): CPT | Performed by: FAMILY MEDICINE

## 2017-08-14 PROCEDURE — 36415 COLL VENOUS BLD VENIPUNCTURE: CPT | Performed by: FAMILY MEDICINE

## 2017-08-14 PROCEDURE — 82306 VITAMIN D 25 HYDROXY: CPT | Performed by: FAMILY MEDICINE

## 2017-08-14 PROCEDURE — 84132 ASSAY OF SERUM POTASSIUM: CPT | Performed by: FAMILY MEDICINE

## 2017-08-14 PROCEDURE — 82565 ASSAY OF CREATININE: CPT | Performed by: FAMILY MEDICINE

## 2017-08-14 RX ORDER — LISINOPRIL 20 MG/1
TABLET ORAL
Qty: 90 TABLET | Refills: 1 | Status: SHIPPED | OUTPATIENT
Start: 2017-08-14 | End: 2018-01-09

## 2017-08-14 RX ORDER — CHLORTHALIDONE 25 MG/1
TABLET ORAL
Qty: 90 TABLET | Refills: 1 | Status: SHIPPED | OUTPATIENT
Start: 2017-08-14 | End: 2017-09-19

## 2017-08-14 RX ORDER — SIMVASTATIN 40 MG
40 TABLET ORAL EVERY EVENING
Qty: 90 TABLET | Refills: 1 | Status: SHIPPED | OUTPATIENT
Start: 2017-08-14 | End: 2018-01-09

## 2017-08-14 ASSESSMENT — PAIN SCALES - GENERAL: PAINLEVEL: NO PAIN (0)

## 2017-08-14 NOTE — PATIENT INSTRUCTIONS
================================================================================  Normal Values   Blood pressure  <140/90 for most adults    <130/80 for some chronic diseases (ask your care team about yours)    BMI (body mass index)  18.5-25 kg/m2 (based on height and weight)     Thank you for visiting [facility]    Normal or non-critical lab and imaging results will be communicated to you by MyChart, letter or phone within 7 days.  If you do not hear from us within 10 days, please call the clinic. If you have a critical or abnormal lab result, we will notify you by phone as soon as possible.     If you have any questions regarding your visit please contact:     Team Comfort:   Clinic Hours Telephone Number   Dr. Maurice Dean 7am-5pm  Monday - Friday [phone]  Peyton RN  Liane Montesinos RN   Pharmacy 8:00am-8pm Monday-Friday    9am-5pm Saturday-Sunday [phone]   Urgent Care 11am-9pm Monday-Friday        9am-5pm Saturday-Sunday [phone]     After hours, weekend or if you need to make an appointment with your primary provider please call [phone].   After Hours nurse advise: call [facility]: [phone]    Medication Refills:  Call your pharmacy and they will forward the refill to us. Please allow 3 business days for your refills to be completed.

## 2017-08-14 NOTE — NURSING NOTE
"Chief Complaint   Patient presents with     Hypertension     Lipids       Initial /77 (BP Location: Left arm, Patient Position: Chair, Cuff Size: Adult Large)  Pulse 87  Temp 98.7  F (37.1  C) (Oral)  Ht 5' 7\" (1.702 m)  Wt 200 lb (90.7 kg)  SpO2 98%  BMI 31.32 kg/m2 Estimated body mass index is 31.32 kg/(m^2) as calculated from the following:    Height as of this encounter: 5' 7\" (1.702 m).    Weight as of this encounter: 200 lb (90.7 kg).  Medication Reconciliation: malou Hare MA      "

## 2017-08-14 NOTE — LETTER
Jose Alejandro LIBBY Deras  [address]  [place] [address]        August 18, 2017          Dear ,    Your LDL (bad cholesterol) was at goal. Your HDL (good cholesterol) was normal. Your triglycerides were above normal.  Poor diet, non-fasting, diabetes, genetics and being overweight can contribute to this. Elevated LDL cholesterol and triglycerides as well as low HDL cholesterol all increase a person's risk for heart and vascular disease. Maintaining a healthy diet with lean proteins, whole grains and healthy fats such as olive oil as well as regular exercise and maintaining an appropriate weight all contribute to healthier cholesterol levels.     Your blood sugar was elevated. This can be an indication that you are predisposed to developing diabetes Maintaining a healthy weight is benificial to good blood sugar control.     Your vitamin D level is low again: This is something we can re-treat (I will send a prescription for it).  It should be rechecked in February.     All of the rest of your test results were within the expected range for you.     Resulted Orders   Potassium   Result Value Ref Range    Potassium 4.0 3.4 - 5.3 mmol/L   Lipid panel reflex to direct LDL   Result Value Ref Range    Cholesterol 179 <200 mg/dL    Triglycerides 349 (H) <150 mg/dL      Comment:      Borderline high:  150-199 mg/dl   High:             200-499 mg/dl   Very high:       >499 mg/dl   Non Fasting      HDL Cholesterol 44 >39 mg/dL    LDL Cholesterol Calculated 65 <100 mg/dL      Comment:      Desirable:       <100 mg/dl    Non HDL Cholesterol 135 (H) <130 mg/dL      Comment:      Above Desirable:  130-159 mg/dl   Borderline high:  160-189 mg/dl   High:             190-219 mg/dl   Very high:       >219 mg/dl     ALT   Result Value Ref Range    ALT 28 0 - 70 U/L   Creatinine   Result Value Ref Range    Creatinine 0.95 0.66 - 1.25 mg/dL    GFR Estimate 82 >60 mL/min/1.7m2      Comment:      Non  GFR  Calc    GFR Estimate If Black >90   GFR Calc   >60 mL/min/1.7m2   Glucose   Result Value Ref Range    Glucose 103 (H) 70 - 99 mg/dL      Comment:      Non Fasting   Hemoglobin A1c   Result Value Ref Range    Hemoglobin A1C 5.7 4.3 - 6.0 %   Vitamin D Deficiency   Result Value Ref Range    Vitamin D Deficiency screening 15 (L) 20 - 75 ug/L      Comment:      Season, race, dietary intake, and treatment affect the concentration of   25-hydroxy-Vitamin D. Values may decrease during winter months and increase   during summer months. Values 20-29 ug/L may indicate Vitamin D insufficiency   and values <20 ug/L may indicate Vitamin D deficiency.  Vitamin D determination is routinely performed by an immunoassay specific for   25 hydroxyvitamin D3.  If an individual is on vitamin D2 (ergocalciferol)   supplementation, please specify 25 OH vitamin D2 and D3 level determination by   LCMSMS test [id number].         If you have any questions or concerns, please call the clinic at the number listed above.     Sincerely,    Maurice Barroso MD/mik

## 2017-08-14 NOTE — PROGRESS NOTES
"  SUBJECTIVE:                                                    Jose Alejandro Deras is a 52 year old male who presents to clinic today for the following health issues:      Hyperlipidemia Follow-Up      Rate your low fat/cholesterol diet?: not monitoring fat    Taking statin?  Yes, no muscle aches from statin    Other lipid medications/supplements?:  none    Hypertension Follow-up      Outpatient blood pressures are being checked at store.  Results are good.    Low Salt Diet: not monitoring salt      Amount of exercise or physical activity: 2 days/week for an average of 45-60 minutes    Problems taking medications regularly: No    Medication side effects: none  Diet: regular (no restrictions)    Past medical, family, and social histories, medications, and allergies are reviewed and updated in Epic.     ROS:  C: NEGATIVE for fever, chills, change in weight  E/M: NEGATIVE for ear, mouth and throat problems  R: NEGATIVE for significant cough or SOB  CV: NEGATIVE for chest pain, palpitations or peripheral edema  ROS otherwise negative    Any history above obtained by the Medical Assistant was reviewed by Dr. Maurice Barroso MD, and edited when necessary.    This document serves as a record of the services and decisions personally performed and made by Dr. Barroso. It was created on his behalf by Zoie Brian, a trained medical scribe. The creation of this document is based the provider's statements to the medical scribe.  Zoie Brian August 14, 2017 3:20 PM     OBJECTIVE:                                                    /77 (BP Location: Left arm, Patient Position: Chair, Cuff Size: Adult Large)  Pulse 87  Temp 98.7  F (37.1  C) (Oral)  Ht 1.702 m (5' 7\")  Wt 90.7 kg (200 lb)  SpO2 98%  BMI 31.32 kg/m2   Body mass index is 31.32 kg/(m^2).     GENERAL: healthy, alert and no distress  EYES: Eyes grossly normal to inspection, PERRL, EOMI, sclerae white and conjunctivae normal  RESP: lungs clear to " auscultation - no crackles or wheezes, no areas of dullness, no tachypnea  CV: Heart regular rate and rhythm without murmur, click or rub. No peripheral edema and peripheral pulses strong  MS: no gross musculoskeletal defects noted, no edema  SKIN: no suspicious lesions or rashes  NEURO: Normal strength and tone, sensory exam grossly normal, mentation intact, oriented times 3 and cranial nerves 2-12 intact  PSYCH: mentation appears normal, affect normal/bright     Diagnostic Test Results:  none      ASSESSMENT/PLAN:                                                      (I10) Essential hypertension with goal blood pressure less than 140/90  (primary encounter diagnosis)  Comment: Well controlled.   Plan: lisinopril (PRINIVIL/ZESTRIL) 20 MG tablet,         chlorthalidone (HYGROTON) 25 MG tablet,         Potassium, Creatinine        Follow up in 6 months at [facility].    (E78.5) Hyperlipidemia LDL goal <130  Comment: Non-fasting. Historically at goal.  Plan: simvastatin (ZOCOR) 40 MG tablet, Lipid panel         reflex to direct LDL, ALT        Follow up in 6 months.    (R73.01) Impaired fasting glucose  Comment: periodic monitoring  Plan: Glucose, Hemoglobin A1c            (E55.9) Vitamin D deficiency  Comment: lab update. Was treated in spring 2016.  Plan: Vitamin D Deficiency        [place] as needed (then recheck in February).    The information in this document, created by the medical scribe for me, accurately reflects the services I personally performed and the decisions made by me. I have reviewed and approved this document for accuracy prior to leaving the patient care area. August 14, 2017 3:20 PM     Maurice Barroso MD

## 2017-08-14 NOTE — MR AVS SNAPSHOT
After Visit Summary   8/14/2017    Jose Alejandro Deras    MRN: [id number]           Patient Information     Date Of Birth          1965        Visit Information        Provider Department      8/14/2017 2:40 PM Maurice Barroso MD [facility]        Today's Diagnoses     Essential hypertension with goal blood pressure less than 140/90    -  1    Hyperlipidemia LDL goal <130        Impaired fasting glucose        Vitamin D deficiency          Care Instructions        ================================================================================  Normal Values   Blood pressure  <140/90 for most adults    <130/80 for some chronic diseases (ask your care team about yours)    BMI (body mass index)  18.5-25 kg/m2 (based on height and weight)     Thank you for visiting [facility]    Normal or non-critical lab and imaging results will be communicated to you by MyChart, letter or phone within 7 days.  If you do not hear from us within 10 days, please call the clinic. If you have a critical or abnormal lab result, we will notify you by phone as soon as possible.     If you have any questions regarding your visit please contact:     Team Comfort:   Clinic Hours Telephone Number   Dr. Maurice Dean 7am-5pm  Monday - Friday [phone]  Peyton Montesinos RN   Pharmacy 8:00am-8pm Monday-Friday    9am-5pm Saturday-Sunday [phone]   Urgent Care 11am-9pm Monday-Friday        9am-5pm Saturday-Sunday [phone]     After hours, weekend or if you need to make an appointment with your primary provider please call [phone].   After Hours nurse advise: call [facility]: [phone]    Medication Refills:  Call your pharmacy and they will forward the refill to us. Please allow 3 business days for your refills to be completed.                  Follow-ups after your visit       "  Follow-up notes from your care team     Return in about 6 months (around 2018) for full physical.      Who to contact     If you have questions or need follow up information about today's clinic visit or your schedule please contact [facility] [place] directly at [phone].  Normal or non-critical lab and imaging results will be communicated to you by MyChart, letter or phone within 4 business days after the clinic has received the results. If you do not hear from us within 7 days, please contact the clinic through MyChart or phone. If you have a critical or abnormal lab result, we will notify you by phone as soon as possible.  Submit refill requests through [employer] or call your pharmacy and they will forward the refill request to us. Please allow 3 business days for your refill to be completed.          Additional Information About Your Visit        MyChart Information     [employer] lets you send messages to your doctor, view your test results, renew your prescriptions, schedule appointments and more. To sign up, go to [email or web address] . Click on \"Log in\" on the left side of the screen, which will take you to the Welcome page. Then click on \"Sign up Now\" on the right side of the page.     You will be asked to enter the access code listed below, as well as some personal information. Please follow the directions to create your username and password.     Your access code is: [email or web address]  Expires: 2017  3:26 PM     Your access code will  in 90 days. If you need help or a new code, please call your [place] clinic or [phone].        Care EveryWhere ID     This is your Care EveryWhere ID. This could be used by other organizations to access your [place] medical records  [id number]        Your Vitals Were     Pulse Temperature Height Pulse Oximetry BMI (Body Mass Index)       87 98.7  F (37.1  C) (Oral) 5' 7\" (1.702 m) 98% 31.32 kg/m2        Blood Pressure from Last 3 " Encounters:   08/14/17 126/77   06/02/17 136/84   05/11/17 132/84    Weight from Last 3 Encounters:   08/14/17 200 lb (90.7 kg)   06/02/17 194 lb (88 kg)   05/11/17 188 lb (85.3 kg)              We Performed the Following     ALT     Creatinine     Glucose     Hemoglobin A1c     Lipid panel reflex to direct LDL     Potassium     Vitamin D Deficiency          Today's Medication Changes          These changes are accurate as of: 8/14/17  3:26 PM.  If you have any questions, ask your nurse or doctor.               These medicines have changed or have updated prescriptions.        Dose/Directions    chlorthalidone 25 MG tablet   Commonly known as:  HYGROTON   This may have changed:  See the new instructions.   Used for:  Essential hypertension with goal blood pressure less than 140/90   Changed by:  Maurice Barroso MD        TAKE 1 TABLET(25 MG) BY MOUTH DAILY FOR BLOOD PRESSURE   Quantity:  90 tablet   Refills:  1       lisinopril 20 MG tablet   Commonly known as:  PRINIVIL/ZESTRIL   This may have changed:  See the new instructions.   Used for:  Essential hypertension with goal blood pressure less than 140/90   Changed by:  Maurice Barroso MD        TAKE 1 TABLET(20 MG) BY MOUTH DAILY FOR BLOOD PRESSURE   Quantity:  90 tablet   Refills:  1            Where to get your medicines      These medications were sent to [id number] - [place], [place] - [address] AT [facility] [address], [facility] [address]     Phone:  [phone]     chlorthalidone 25 MG tablet    lisinopril 20 MG tablet    simvastatin 40 MG tablet                Primary Care Provider Office Phone # Fax #    Maurice Barroso -[phone]       [address]  [facility] [address]        Equal Access to Services     MAIA REBOLLEDO : Rhonda monroe Soángel, waaxda luqadaha, qaybta kaalmada adedorothyyada, yanelis su. [facility] [phone].    ATENCIÓN: Si  tahira walsh, tiene a fung disposición servicios gratuitos de asistencia lingüística. Barrett mensah [phone].    We comply with applicable federal civil rights laws and [place] laws. We do not discriminate on the basis of race, color, national origin, age, disability sex, sexual orientation or gender identity.            Thank you!     Thank you for choosing [facility]  for your care. Our goal is always to provide you with excellent care. Hearing back from our patients is one way we can continue to improve our services. Please take a few minutes to complete the written survey that you may receive in the mail after your visit with us. Thank you!             Your Updated Medication List - Protect others around you: Learn how to safely use, store and throw away your medicines at www.disposemymeds.org.          This list is accurate as of: 8/14/17  3:26 PM.  Always use your most recent med list.                   Brand Name Dispense Instructions for use Diagnosis    chlorthalidone 25 MG tablet    HYGROTON    90 tablet    TAKE 1 TABLET(25 MG) BY MOUTH DAILY FOR BLOOD PRESSURE    Essential hypertension with goal blood pressure less than 140/90       lisinopril 20 MG tablet    PRINIVIL/ZESTRIL    90 tablet    TAKE 1 TABLET(20 MG) BY MOUTH DAILY FOR BLOOD PRESSURE    Essential hypertension with goal blood pressure less than 140/90       MULTIPLE VITAMINS PO      Take by mouth daily        polyvinyl alcohol 1.4 % ophthalmic solution    ARTIFICIAL TEARS    6 mL    Place 1 drop into the right eye 4 times daily    Epiphora, right, Bell's palsy       REFRESH P.M. Oint     3.5 g    Apply 3.5 g to eye At Bedtime    Bell's palsy, Epiphora, right       simvastatin 40 MG tablet    ZOCOR    90 tablet    Take 1 tablet (40 mg) by mouth every evening for cholesterol.    Hyperlipidemia LDL goal <130       sulindac 200 MG tablet    CLINORIL    60 tablet    Take 1 tablet (200 mg) by mouth 2 times daily (with meals)     Impingement syndrome, shoulder, left, Incomplete tear of left rotator cuff

## 2017-08-15 LAB — DEPRECATED CALCIDIOL+CALCIFEROL SERPL-MC: 15 UG/L (ref 20–75)

## 2017-08-16 RX ORDER — CHLORTHALIDONE 25 MG/1
TABLET ORAL
Qty: 90 TABLET | Refills: 1 | Status: SHIPPED | OUTPATIENT
Start: 2017-08-16 | End: 2018-01-09

## 2017-08-22 ENCOUNTER — DOCUMENTATION ONLY (OUTPATIENT)
Dept: LAB | Facility: CLINIC | Age: 52
End: 2017-08-22

## 2017-08-22 DIAGNOSIS — E78.5 HYPERLIPIDEMIA LDL GOAL <130: ICD-10-CM

## 2017-08-22 DIAGNOSIS — E78.5 HYPERLIPIDEMIA LDL GOAL <130: Primary | ICD-10-CM

## 2017-08-22 LAB
CHOLEST SERPL-MCNC: 226 MG/DL
HDLC SERPL-MCNC: 51 MG/DL
LDLC SERPL CALC-MCNC: 143 MG/DL
NONHDLC SERPL-MCNC: 175 MG/DL
TRIGL SERPL-MCNC: 159 MG/DL

## 2017-08-22 PROCEDURE — 36415 COLL VENOUS BLD VENIPUNCTURE: CPT | Performed by: FAMILY MEDICINE

## 2017-08-22 PROCEDURE — 80061 LIPID PANEL: CPT | Performed by: FAMILY MEDICINE

## 2017-08-22 NOTE — PROGRESS NOTES
Patient was here in lab today 08/22/17 at 1400 to do fasting lab, due to the previous fasting lab was little elevated and want to do the fasting lab as fasting. Please provide or sign the future lab order otherwise please let know so we can reject his blood sample from today. Thank you, kv

## 2017-09-19 ENCOUNTER — OFFICE VISIT (OUTPATIENT)
Dept: FAMILY MEDICINE | Facility: CLINIC | Age: 52
End: 2017-09-19
Payer: COMMERCIAL

## 2017-09-19 VITALS
OXYGEN SATURATION: 97 % | WEIGHT: 198 LBS | BODY MASS INDEX: 31.08 KG/M2 | HEART RATE: 96 BPM | DIASTOLIC BLOOD PRESSURE: 80 MMHG | HEIGHT: 67 IN | TEMPERATURE: 98.4 F | SYSTOLIC BLOOD PRESSURE: 126 MMHG

## 2017-09-19 DIAGNOSIS — G51.0 LEFT-SIDED BELL'S PALSY: Primary | ICD-10-CM

## 2017-09-19 PROCEDURE — 99214 OFFICE O/P EST MOD 30 MIN: CPT | Performed by: FAMILY MEDICINE

## 2017-09-19 RX ORDER — VALACYCLOVIR HYDROCHLORIDE 1 G/1
1000 TABLET, FILM COATED ORAL 3 TIMES DAILY
Qty: 21 TABLET | Refills: 0 | Status: SHIPPED | OUTPATIENT
Start: 2017-09-19 | End: 2017-09-26

## 2017-09-19 RX ORDER — PREDNISONE 20 MG/1
60 TABLET ORAL
Qty: 21 TABLET | Refills: 0 | Status: SHIPPED | OUTPATIENT
Start: 2017-09-19 | End: 2017-09-26

## 2017-09-19 ASSESSMENT — PAIN SCALES - GENERAL: PAINLEVEL: NO PAIN (0)

## 2017-09-19 NOTE — PATIENT INSTRUCTIONS
================================================================================  Normal Values   Blood pressure  <140/90 for most adults    <130/80 for some chronic diseases (ask your care team about yours)    BMI (body mass index)  18.5-25 kg/m2 (based on height and weight)     Thank you for visiting [facility]    Normal or non-critical lab and imaging results will be communicated to you by MyChart, letter or phone within 7 days.  If you do not hear from us within 10 days, please call the clinic. If you have a critical or abnormal lab result, we will notify you by phone as soon as possible.     If you have any questions regarding your visit please contact:     Team Comfort:   Clinic Hours Telephone Number   Dr. Maurice Dean 7am-5pm  Monday - Friday [phone]  Peyton Montesinos RN   Pharmacy 8:00am-8pm Monday-Friday    9am-5pm Saturday-Sunday [phone]   Urgent Care 11am-9pm Monday-Friday        9am-5pm Saturday-Sunday [phone]     After hours, weekend or if you need to make an appointment with your primary provider please call [phone].   After Hours nurse advise: call [facility]: [phone]    Medication Refills:  Call your pharmacy and they will forward the refill to us. Please allow 3 business days for your refills to be completed.        from UpToDate :      Recurrence   Recurrent attacks of idiopathic facial palsy on either the ipsilateral or contralateral side have been observed 7 to 15 percent of patients. In one of the largest series, which followed 1980 patients with Bell's palsy, the following observations were reported:  ?The recurrence rate was 7 percent, and the mean time to recurrence was approximately 10 years.  ?A third or fourth attack was unusual, occurring in 3 and 1.5 percent of cases, respectively.  ?Recurrence did not portend a worse prognosis for recovery. None  of the patients with recurrent Bell's palsy who were imaged by head CT had a facial nerve neuroma. In addition, 77 patients were followed for a mean of 33 years (range 2.8 to 60 years) after the initial episode, and none developed signs of a tumor or showed progressive facial nerve dysfunction.

## 2017-09-19 NOTE — MR AVS SNAPSHOT
After Visit Summary   9/19/2017    Jose Alejandro Deras    MRN: [id number]           Patient Information     Date Of Birth          1965        Visit Information        Provider Department      9/19/2017 4:00 PM Maurice Barroso MD [facility]        Today's Diagnoses     Left-sided Bell's palsy    -  1      Care Instructions        ================================================================================  Normal Values   Blood pressure  <140/90 for most adults    <130/80 for some chronic diseases (ask your care team about yours)    BMI (body mass index)  18.5-25 kg/m2 (based on height and weight)     Thank you for visiting [facility]    Normal or non-critical lab and imaging results will be communicated to you by MyChart, letter or phone within 7 days.  If you do not hear from us within 10 days, please call the clinic. If you have a critical or abnormal lab result, we will notify you by phone as soon as possible.     If you have any questions regarding your visit please contact:     Team Comfort:   Clinic Hours Telephone Number   Dr. Maurice Dean 7am-5pm  Monday - Friday [phone]  Peyton Montesinos RN   Pharmacy 8:00am-8pm Monday-Friday    9am-5pm Saturday-Sunday [phone]   Urgent Care 11am-9pm Monday-Friday        9am-5pm Saturday-Sunday [phone]     After hours, weekend or if you need to make an appointment with your primary provider please call [phone].   After Hours nurse advise: call [facility]: [phone]    Medication Refills:  Call your pharmacy and they will forward the refill to us. Please allow 3 business days for your refills to be completed.        from UpToDate :      Recurrence -- Recurrent attacks of idiopathic facial palsy on either the ipsilateral or contralateral side have been observed 7 to 15 percent of patients. In one  "of the largest series, which followed 1980 patients with Bell's palsy, the following observations were reported:  ?The recurrence rate was 7 percent, and the mean time to recurrence was approximately 10 years.  ?A third or fourth attack was unusual, occurring in 3 and 1.5 percent of cases, respectively.  ?Recurrence did not portend a worse prognosis for recovery. None of the patients with recurrent Bell's palsy who were imaged by head CT had a facial nerve neuroma. In addition, 77 patients were followed for a mean of 33 years (range 2.8 to 60 years) after the initial episode, and none developed signs of a tumor or showed progressive facial nerve dysfunction.            Follow-ups after your visit        Follow-up notes from your care team     Return in about 2 weeks (around 10/3/2017).      Who to contact     If you have questions or need follow up information about today's clinic visit or your schedule please contact [facility] [place] directly at [phone].  Normal or non-critical lab and imaging results will be communicated to you by MyChart, letter or phone within 4 business days after the clinic has received the results. If you do not hear from us within 7 days, please contact the clinic through [employer] or phone. If you have a critical or abnormal lab result, we will notify you by phone as soon as possible.  Submit refill requests through [employer] or call your pharmacy and they will forward the refill request to us. Please allow 3 business days for your refill to be completed.          Additional Information About Your Visit        [employer] Information     [employer] lets you send messages to your doctor, view your test results, renew your prescriptions, schedule appointments and more. To sign up, go to [email or web address] . Click on \"Log in\" on the left side of the screen, which will take you to the Welcome page. Then click on \"Sign up Now\" on the right side of the page.     You will be asked to " "enter the access code listed below, as well as some personal information. Please follow the directions to create your username and password.     Your access code is: [email or web address]  Expires: 2017  3:26 PM     Your access code will  in 90 days. If you need help or a new code, please call your [place] clinic or [phone].        Care EveryWhere ID     This is your Care EveryWhere ID. This could be used by other organizations to access your [place] medical records  [id number]        Your Vitals Were     Pulse Temperature Height Pulse Oximetry BMI (Body Mass Index)       96 98.4  F (36.9  C) (Oral) 5' 7\" (1.702 m) 97% 31.01 kg/m2        Blood Pressure from Last 3 Encounters:   17 126/80   17 126/77   17 136/84    Weight from Last 3 Encounters:   17 198 lb (89.8 kg)   17 200 lb (90.7 kg)   17 194 lb (88 kg)              Today, you had the following     No orders found for display         Today's Medication Changes          These changes are accurate as of: 17  4:49 PM.  If you have any questions, ask your nurse or doctor.               Start taking these medicines.        Dose/Directions    predniSONE 20 MG tablet   Commonly known as:  DELTASONE   Used for:  Left-sided Bell's palsy   Started by:  Maurice Barroso MD        Dose:  60 mg   Take 3 tablets (60 mg) by mouth daily with food for 7 days   Quantity:  21 tablet   Refills:  0       valACYclovir 1000 mg tablet   Commonly known as:  VALTREX   Used for:  Left-sided Bell's palsy   Started by:  Maurice Barroso MD        Dose:  1000 mg   Take 1 tablet (1,000 mg) by mouth 3 times daily for 7 days   Quantity:  21 tablet   Refills:  0            Where to get your medicines      These medications were sent to [id number] [place] [address] AT [facility] [address], [place] [address]     Phone:  [phone]     predniSONE 20 MG tablet    valACYclovir " 1000 mg tablet                Primary Care Provider Office Phone # Fax #    Maurice Barroso [phone] [address]  [facility] [address]        Equal Access to Services     MAIA REBOLLEDO : Hadii gretchen ku reyeso Soluisali, waaxda luqadaha, qaybta kaalmada adeegyada, waxcora idiin ivonnen lonnie strauss laLuísjuanjo su. So [facility] [phone].    ATENCIÓN: Si habla español, tiene a fung disposición servicios gratuitos de asistencia lingüística. Llame al [phone].    We comply with applicable federal civil rights laws and [place] laws. We do not discriminate on the basis of race, color, national origin, age, disability sex, sexual orientation or gender identity.            Thank you!     Thank you for choosing [facility]  for your care. Our goal is always to provide you with excellent care. Hearing back from our patients is one way we can continue to improve our services. Please take a few minutes to complete the written survey that you may receive in the mail after your visit with us. Thank you!             Your Updated Medication List - Protect others around you: Learn how to safely use, store and throw away your medicines at www.disposemymeds.org.          This list is accurate as of: 9/19/17  4:49 PM.  Always use your most recent med list.                   Brand Name Dispense Instructions for use Diagnosis    chlorthalidone 25 MG tablet    HYGROTON    90 tablet    TAKE 1 TABLET(25 MG) BY MOUTH DAILY FOR BLOOD PRESSURE    Essential hypertension, benign       cholecalciferol 73514 UNITS capsule    VITAMIN D3    8 capsule    Take 1 capsule (50,000 Units) by mouth once a week for 8 doses    Vitamin D deficiency       lisinopril 20 MG tablet    PRINIVIL/ZESTRIL    90 tablet    TAKE 1 TABLET(20 MG) BY MOUTH DAILY FOR BLOOD PRESSURE    Essential hypertension with goal blood pressure less than 140/90       MULTIPLE VITAMINS PO      Take by mouth daily        polyvinyl alcohol 1.4 %  ophthalmic solution    ARTIFICIAL TEARS    6 mL    Place 1 drop into the right eye 4 times daily    Epiphora, right, Bell's palsy       predniSONE 20 MG tablet    DELTASONE    21 tablet    Take 3 tablets (60 mg) by mouth daily with food for 7 days    Left-sided Bell's palsy       REFRESH P.M. Oint     3.5 g    Apply 3.5 g to eye At Bedtime    Bell's palsy, Epiphora, right       simvastatin 40 MG tablet    ZOCOR    90 tablet    Take 1 tablet (40 mg) by mouth every evening for cholesterol.    Hyperlipidemia LDL goal <130       sulindac 200 MG tablet    CLINORIL    60 tablet    Take 1 tablet (200 mg) by mouth 2 times daily (with meals)    Impingement syndrome, shoulder, left, Incomplete tear of left rotator cuff       valACYclovir 1000 mg tablet    VALTREX    21 tablet    Take 1 tablet (1,000 mg) by mouth 3 times daily for 7 days    Left-sided Bell's palsy

## 2017-09-19 NOTE — PROGRESS NOTES
SUBJECTIVE:   Jose Alejandro Deras is a 52 year old male who presents to clinic today for the following health issues:      Concern - Feels Bell's Palsy       When did it start?: about 2-3 days ago    Any strain/injury, other illness, or event to trigger this problem?   no    Previous history of similar problem:   YES - seen for this issue initially on 5/11/17, but was on the right side      Location:           Left eye & mouth    Describe it:   Weakness of eye & unable to control mouth     Severity: moderate      Progression of symptoms from onset until now:  same      Accompanying Signs & Symptoms:  Weakness of eye & unable to control mouth  Dizzy, fatigued, slight pain on the left side of head   What have you noticed that tends to make this worse?     None      What have you noticed that tends to make this better?     None      What have you done (this time) to try to treat this problem yourself?     None      Did it help?     no           Past medical, family, and social histories, medications, and allergies are reviewed and updated in Epic.     ROS:  C: NEGATIVE for fever, chills, change in weight  E/M: NEGATIVE for ear, mouth and throat problems  R: NEGATIVE for significant cough or SOB  CV: NEGATIVE for chest pain, palpitations or peripheral edema  ROS otherwise negative    Any history above obtained by the Medical Assistant was reviewed by Dr. Maurice Barroso MD, and edited when necessary.    This document serves as a record of the services and decisions personally performed and made by Dr. Barroso. It was created on his behalf by Zoie Brian, a trained medical scribe. The creation of this document is based the provider's statements to the medical scribe.  Zoie Brian September 19, 2017 4:39 PM     OBJECTIVE:                                                    /80 (BP Location: Right arm, Patient Position: Chair, Cuff Size: Adult Large)  Pulse 96  Temp 98.4  F (36.9  C) (Oral)  Ht 1.702 m  "(5' 7\")  Wt 89.8 kg (198 lb)  SpO2 97%  BMI 31.01 kg/m2   Body mass index is 31.01 kg/(m^2).     GENERAL: healthy, alert and no distress  EYES: Eyes grossly normal to inspection, PERRL, EOMI, sclerae white and conjunctivae normal  HENT: Tongue normal (no fissuring).   MS: no gross musculoskeletal defects noted, no edema  SKIN: no suspicious lesions or rashes  NEURO: Normal strength and tone, sensory exam grossly normal, mentation intact, oriented times 3 and cranial nerves 2-12 intact except for testing of left facial muscles (height of raised eyebrows unequal, closing eye tight against resistance, unable to keep cheeks puffed out when resisted because of left sided leak)    PSYCH: mentation appears normal, affect normal/bright     Diagnostic Test Results:  none      ASSESSMENT/PLAN:                                                      (G51.0) Left-sided Bell's palsy  (primary encounter diagnosis)  Comment: Recurrence on contralateral side 4 months later. Cranial nerve exam suggests that this is a milder case perhaps because he is presenting a little earlier.   Plan: predniSONE (DELTASONE) 20 MG tablet,         valACYclovir (VALTREX) 1000 mg tablet        Follow up in 2 weeks. Excerpt from UpToDate provided in the AVS.       The information in this document, created by the medical scribe for me, accurately reflects the services I personally performed and the decisions made by me. I have reviewed and approved this document for accuracy prior to leaving the patient care area. September 19, 2017 4:39 PM   Maurice Barroso MD     "

## 2017-09-19 NOTE — NURSING NOTE
"Chief Complaint   Patient presents with     Eye Problem     Mouth Problem       Initial /80 (BP Location: Right arm, Patient Position: Chair, Cuff Size: Adult Large)  Pulse 96  Temp 98.4  F (36.9  C) (Oral)  Ht 5' 7\" (1.702 m)  Wt 198 lb (89.8 kg)  SpO2 97%  BMI 31.01 kg/m2 Estimated body mass index is 31.01 kg/(m^2) as calculated from the following:    Height as of this encounter: 5' 7\" (1.702 m).    Weight as of this encounter: 198 lb (89.8 kg).  Medication Reconciliation: complete   MIKEL Hare MA      "

## 2017-10-03 ENCOUNTER — OFFICE VISIT (OUTPATIENT)
Dept: FAMILY MEDICINE | Facility: CLINIC | Age: 52
End: 2017-10-03
Payer: COMMERCIAL

## 2017-10-03 VITALS
HEIGHT: 67 IN | BODY MASS INDEX: 30.76 KG/M2 | WEIGHT: 196 LBS | HEART RATE: 76 BPM | SYSTOLIC BLOOD PRESSURE: 121 MMHG | OXYGEN SATURATION: 98 % | TEMPERATURE: 98.1 F | DIASTOLIC BLOOD PRESSURE: 73 MMHG

## 2017-10-03 DIAGNOSIS — G51.0 LEFT-SIDED BELL'S PALSY: Primary | ICD-10-CM

## 2017-10-03 PROCEDURE — 99213 OFFICE O/P EST LOW 20 MIN: CPT | Performed by: FAMILY MEDICINE

## 2017-10-03 ASSESSMENT — PAIN SCALES - GENERAL: PAINLEVEL: NO PAIN (0)

## 2017-10-03 NOTE — MR AVS SNAPSHOT
After Visit Summary   10/3/2017    Jose Alejandro Deras    MRN: [id number]           Patient Information     Date Of Birth          1965        Visit Information        Provider Department      10/3/2017 3:40 PM Maurice Barroso MD [facility]        Today's Diagnoses     Left-sided Bell's palsy    -  1      Care Instructions        ================================================================================  Normal Values   Blood pressure  <140/90 for most adults    <130/80 for some chronic diseases (ask your care team about yours)    BMI (body mass index)  18.5-25 kg/m2 (based on height and weight)     Thank you for visiting [facility]    Normal or non-critical lab and imaging results will be communicated to you by MyChart, letter or phone within 7 days.  If you do not hear from us within 10 days, please call the clinic. If you have a critical or abnormal lab result, we will notify you by phone as soon as possible.     If you have any questions regarding your visit please contact:     Team Comfort:   Clinic Hours Telephone Number   Dr. Maurice Dean 7am-5pm  Monday - Friday [phone]  Peyton Montesinos RN   Pharmacy 8:00am-8pm Monday-Friday    9am-5pm Saturday-Sunday [phone]   Urgent Care 11am-9pm Monday-Friday        9am-5pm Saturday-Sunday [phone]     After hours, weekend or if you need to make an appointment with your primary provider please call [phone].   After Hours nurse advise: call [facility]: [phone]    Medication Refills:  Call your pharmacy and they will forward the refill to us. Please allow 3 business days for your refills to be completed.                  Follow-ups after your visit        Follow-up notes from your care team     Return in about 4 months (around 2/14/2018) for full physical, lab tests, recheck medication.      Your next 10  "appointments already scheduled     Oct 10, 2017  4:20 PM CDT   [facility]    [address] [place] [id number] [phone]              Who to contact     If you have questions or need follow up information about today's clinic visit or your schedule please contact [facility] directly at [phone].  Normal or non-critical lab and imaging results will be communicated to you by MyChart, letter or phone within 4 business days after the clinic has received the results. If you do not hear from us within 7 days, please contact the clinic through MyChart or phone. If you have a critical or abnormal lab result, we will notify you by phone as soon as possible.  Submit refill requests through [employer] or call your pharmacy and they will forward the refill request to us. Please allow 3 business days for your refill to be completed.          Additional Information About Your Visit        [employer] Information     [employer] lets you send messages to your doctor, view your test results, renew your prescriptions, schedule appointments and more. To sign up, go to [email or web address] . Click on \"Log in\" on the left side of the screen, which will take you to the Welcome page. Then click on \"Sign up Now\" on the right side of the page.     You will be asked to enter the access code listed below, as well as some personal information. Please follow the directions to create your username and password.     Your access code is: [email or web address]  Expires: 2017  3:26 PM     Your access code will  in 90 days. If you need help or a new code, please call your [place] clinic or [phone].        Care EveryWhere ID     This is your Care EveryWhere ID. This could be used by other organizations to access your [place] medical records  [id number]        Your Vitals Were     Pulse Temperature Height Pulse " "Oximetry BMI (Body Mass Index)       76 98.1  F (36.7  C) (Oral) 1.702 m (5' 7\") 98% 30.7 kg/m2        Blood Pressure from Last 3 Encounters:   10/03/17 121/73   09/19/17 126/80   08/14/17 126/77    Weight from Last 3 Encounters:   10/03/17 88.9 kg (196 lb)   09/19/17 89.8 kg (198 lb)   08/14/17 90.7 kg (200 lb)              Today, you had the following     No orders found for display       Primary Care Provider Office Phone # Fax #    Maurice Barroso [phone]       [address]  [facility] [address]        Equal Access to Services     MAIA REBOLLEDO : Hadii aad ku hadasho Soomaali, waaxda luqadaha, qaybta kaalmada adeegyada, waxay jerson hayjuanjo hoover . So [facility] [phone].    ATENCIÓN: Si habla español, tiene a fung disposición servicios gratuitos de asistencia lingüística. Llame al [phone].    We comply with applicable federal civil rights laws and [place] laws. We do not discriminate on the basis of race, color, national origin, age, disability, sex, sexual orientation, or gender identity.            Thank you!     Thank you for choosing [facility]  for your care. Our goal is always to provide you with excellent care. Hearing back from our patients is one way we can continue to improve our services. Please take a few minutes to complete the written survey that you may receive in the mail after your visit with us. Thank you!             Your Updated Medication List - Protect others around you: Learn how to safely use, store and throw away your medicines at www.disposemymeds.org.          This list is accurate as of: 10/3/17  4:20 PM.  Always use your most recent med list.                   Brand Name Dispense Instructions for use Diagnosis    chlorthalidone 25 MG tablet    HYGROTON    90 tablet    TAKE 1 TABLET(25 MG) BY MOUTH DAILY FOR BLOOD PRESSURE    Essential hypertension, benign       cholecalciferol 51559 UNITS capsule    VITAMIN D3    8 " capsule    Take 1 capsule (50,000 Units) by mouth once a week for 8 doses    Vitamin D deficiency       lisinopril 20 MG tablet    PRINIVIL/ZESTRIL    90 tablet    TAKE 1 TABLET(20 MG) BY MOUTH DAILY FOR BLOOD PRESSURE    Essential hypertension with goal blood pressure less than 140/90       MULTIPLE VITAMINS PO      Take by mouth daily        polyvinyl alcohol 1.4 % ophthalmic solution    ARTIFICIAL TEARS    6 mL    Place 1 drop into the right eye 4 times daily    Epiphora, right, Bell's palsy       REFRESH P.M. Oint     3.5 g    Apply 3.5 g to eye At Bedtime    Bell's palsy, Epiphora, right       simvastatin 40 MG tablet    ZOCOR    90 tablet    Take 1 tablet (40 mg) by mouth every evening for cholesterol.    Hyperlipidemia LDL goal <130       sulindac 200 MG tablet    CLINORIL    60 tablet    Take 1 tablet (200 mg) by mouth 2 times daily (with meals)    Impingement syndrome, shoulder, left, Incomplete tear of left rotator cuff       valACYclovir 1000 mg tablet    VALTREX    21 tablet    Take 1 tablet (1,000 mg) by mouth 3 times daily for 7 days    Left-sided Bell's palsy

## 2017-10-03 NOTE — PROGRESS NOTES
SUBJECTIVE:   Jose Alejandro Deras is a 52 year old male who presents to clinic today for the following health issues:      Concern - Recheck Bell's Palsy     When did it start?: about 2 weeks    Any strain/injury, other illness, or event to trigger this problem?   no    Previous history of similar problem:   YES- left & right side      Location:           Left Eye    Describe it:   Weakness of left eye & unable to control    Severity: mild      Progression of symptoms from onset until now:  Improving - only remaining symptom is unable to close the left eye completely when blinking which he especially notices when he's in the shower and is getting soap in his eyes      Accompanying Signs & Symptoms:  Weakness of left & unable to control   What have you noticed that tends to make this worse?     None      What have you noticed that tends to make this better?   Prednisone  Valtrex        What have you done (this time) to try to treat this problem yourself?     none      Did it help?     yes         He is scheduled to meet with the eye doctor () on 10/10.    Past medical, family, and social histories, medications, and allergies are reviewed and updated in Epic.     ROS:  C: NEGATIVE for fever, chills, change in weight  E/M: NEGATIVE for ear, mouth and throat problems  R: NEGATIVE for significant cough or SOB  CV: NEGATIVE for chest pain, palpitations or peripheral edema  ROS otherwise negative    Any history above obtained by the Medical Assistant was reviewed by Dr. Maurice Barroso MD, and edited when necessary.    This document serves as a record of the services and decisions personally performed and made by Dr. Barroso. It was created on his behalf by Zoie Brian, a trained medical scribe. The creation of this document is based the provider's statements to the medical scribe.  Zoie Brian October 3, 2017 4:16 PM     OBJECTIVE:                                                    /73 (BP Location:  "Left arm, Patient Position: Chair, Cuff Size: Adult Large)  Pulse 76  Temp 98.1  F (36.7  C) (Oral)  Ht 1.702 m (5' 7\")  Wt 88.9 kg (196 lb)  SpO2 98%  BMI 30.7 kg/m2   Body mass index is 30.7 kg/(m^2).     GENERAL: healthy, alert and no distress  EYES: Eyes grossly normal to inspection, PERRL, EOMI, sclerae white and conjunctivae normal  MS: no gross musculoskeletal defects noted, no edema  SKIN: no suspicious lesions or rashes  NEURO: Normal strength and tone, sensory exam grossly normal, mentation intact, oriented times 3 and cranial nerves 2-12 intact except for decreased left orbicularis oculi tone (delayed closing and early opening of the left eye compared to right) and a little decreased strength in puffing out the cheeks with resistance on the left  PSYCH: mentation appears normal, affect normal/bright     Diagnostic Test Results:  none      ASSESSMENT/PLAN:                                                      (G51.0) Left-sided Bell's palsy  (primary encounter diagnosis)  Comment: Recovering well. I don't think there is anything left for me to do for him.   Plan: Follow up with optometry next week.    Return in about 4 months (around 2/14/2018) for full physical, lab tests, recheck medication.     The information in this document, created by the medical scribe for me, accurately reflects the services I personally performed and the decisions made by me. I have reviewed and approved this document for accuracy prior to leaving the patient care area. October 3, 2017 4:16 PM   Maurice Barroso MD     "

## 2017-10-03 NOTE — NURSING NOTE
"Chief Complaint   Patient presents with     RECHECK       Initial /73 (BP Location: Left arm, Patient Position: Chair, Cuff Size: Adult Large)  Pulse 76  Temp 98.1  F (36.7  C) (Oral)  Ht 5' 7\" (1.702 m)  Wt 196 lb (88.9 kg)  SpO2 98%  BMI 30.7 kg/m2 Estimated body mass index is 30.7 kg/(m^2) as calculated from the following:    Height as of this encounter: 5' 7\" (1.702 m).    Weight as of this encounter: 196 lb (88.9 kg).  Medication Reconciliation: malou Hare MA      "

## 2017-10-03 NOTE — PATIENT INSTRUCTIONS
================================================================================  Normal Values   Blood pressure  <140/90 for most adults    <130/80 for some chronic diseases (ask your care team about yours)    BMI (body mass index)  18.5-25 kg/m2 (based on height and weight)     Thank you for visiting [facility]    Normal or non-critical lab and imaging results will be communicated to you by MyChart, letter or phone within 7 days.  If you do not hear from us within 10 days, please call the clinic. If you have a critical or abnormal lab result, we will notify you by phone as soon as possible.     If you have any questions regarding your visit please contact:     Team Comfort:   Clinic Hours Telephone Number   Dr. Maruice Morrison Dr. [place] 7am-5pm  Monday - Friday [phone]  Peyton RN  Liane RN  Yamel RN   Pharmacy 8:00am-8pm Monday-Friday    9am-5pm Saturday-Sunday [phone]   Urgent Care 11am-9pm Monday-Friday        9am-5pm Saturday-Sunday [phone]     After hours, weekend or if you need to make an appointment with your primary provider please call [phone].   After Hours nurse advise: call [facility]: [phone]    Medication Refills:  Call your pharmacy and they will forward the refill to us. Please allow 3 business days for your refills to be completed.

## 2017-10-10 ENCOUNTER — OFFICE VISIT (OUTPATIENT)
Dept: OPTOMETRY | Facility: CLINIC | Age: 52
End: 2017-10-10
Payer: COMMERCIAL

## 2017-10-10 DIAGNOSIS — G51.0 BELL'S PALSY: Primary | ICD-10-CM

## 2017-10-10 PROCEDURE — 99213 OFFICE O/P EST LOW 20 MIN: CPT | Performed by: OPTOMETRIST

## 2017-10-10 ASSESSMENT — VISUAL ACUITY
OD_SC: 20/30
OS_SC: 20/25
OD_SC+: -1
METHOD: SNELLEN - LINEAR
OS_SC+: -2

## 2017-10-10 ASSESSMENT — SLIT LAMP EXAM - LIDS: COMMENTS: NORMAL

## 2017-10-10 ASSESSMENT — EXTERNAL EXAM - RIGHT EYE: OD_EXAM: NORMAL

## 2017-10-10 ASSESSMENT — EXTERNAL EXAM - LEFT EYE: OS_EXAM: NORMAL

## 2017-10-10 NOTE — PATIENT INSTRUCTIONS
Artificial tears- 1 drop left eye 3 x day.    Refresh pm at night.    Return in 2 weeks for recheck or sooner if needed.    Cirilo Eagle, OD

## 2017-10-10 NOTE — PROGRESS NOTES
CHIEF COMPLAINT:   Chief Complaint   Patient presents with     Eye Problem     os eye bells palsy       Patient had bells palsy may 2017 in od eye now in os eye and ointment and drops not helping.         Dorie Mclaughlin, Optometric Assistant, A.B.O.C.   OBJECTIVE:     See ophthalmology exam    ASSESSMENT:         ICD-10-CM    1. Bell's palsy G51.0      PLAN:      Patient Instructions   Artificial tears- 1 drop left eye 3 x day.    Refresh pm at night.    Return in 2 weeks for recheck or sooner if needed.    Cirilo Eagle, OD

## 2017-10-10 NOTE — MR AVS SNAPSHOT
"              After Visit Summary   10/10/2017    Jose Alejandro Deras    MRN: [id number]           Patient Information     Date Of Birth          1965        Visit Information        Provider Department      10/10/2017 4:20 PM Cirilo Eagle OD [facility]        Today's Diagnoses     Bell's palsy    -  1      Care Instructions    Artificial tears- 1 drop left eye 3 x day.    Refresh pm at night.    Return in 2 weeks for recheck or sooner if needed.    Cirilo Eagle OD            Follow-ups after your visit        Follow-up notes from your care team     Return in about 2 weeks (around 10/24/2017), or if symptoms worsen or fail to improve, for Follow Up.      Who to contact     If you have questions or need follow up information about today's clinic visit or your schedule please contact [facility] directly at [phone].  Normal or non-critical lab and imaging results will be communicated to you by MyChart, letter or phone within 4 business days after the clinic has received the results. If you do not hear from us within 7 days, please contact the clinic through MyChart or phone. If you have a critical or abnormal lab result, we will notify you by phone as soon as possible.  Submit refill requests through [employer] or call your pharmacy and they will forward the refill request to us. Please allow 3 business days for your refill to be completed.          Additional Information About Your Visit        MyChart Information     [employer] lets you send messages to your doctor, view your test results, renew your prescriptions, schedule appointments and more. To sign up, go to [email or web address] . Click on \"Log in\" on the left side of the screen, which will take you to the Welcome page. Then click on \"Sign up Now\" on the right side of the page.     You will be asked to enter the access code listed below, as well as some personal information. Please follow the directions to create " your username and password.     Your access code is: [email or web address]  Expires: 2017  3:26 PM     Your access code will  in 90 days. If you need help or a new code, please call your [place] clinic or [phone].        Care EveryWhere ID     This is your Care EveryWhere ID. This could be used by other organizations to access your [place] medical records  [id number]         Blood Pressure from Last 3 Encounters:   10/03/17 121/73   17 126/80   17 126/77    Weight from Last 3 Encounters:   10/03/17 88.9 kg (196 lb)   17 89.8 kg (198 lb)   17 90.7 kg (200 lb)              Today, you had the following     No orders found for display       Primary Care Provider Office Phone # Fax #    Maurice Barroso [phone]       [address]  [facility] [address]        Equal Access to Services     [facility]: Hadii aad ku hadasho Soomaali, waaxda luqadaha, qaybta kaalmada adeegyada, waxay idiin hayjuanjo hoover . So [facility] [phone].    ATENCIÓN: Si habla español, tiene a fung disposición servicios gratuitos de asistencia lingüística. Llame al [phone].    We comply with applicable federal civil rights laws and [place] laws. We do not discriminate on the basis of race, color, national origin, age, disability, sex, sexual orientation, or gender identity.            Thank you!     Thank you for choosing [facility]  for your care. Our goal is always to provide you with excellent care. Hearing back from our patients is one way we can continue to improve our services. Please take a few minutes to complete the written survey that you may receive in the mail after your visit with us. Thank you!             Your Updated Medication List - Protect others around you: Learn how to safely use, store and throw away your medicines at www.disposemymeds.org.          This list is accurate as of: 10/10/17  5:18 PM.  Always use your most recent med  list.                   Brand Name Dispense Instructions for use Diagnosis    chlorthalidone 25 MG tablet    HYGROTON    90 tablet    TAKE 1 TABLET(25 MG) BY MOUTH DAILY FOR BLOOD PRESSURE    Essential hypertension, benign       lisinopril 20 MG tablet    PRINIVIL/ZESTRIL    90 tablet    TAKE 1 TABLET(20 MG) BY MOUTH DAILY FOR BLOOD PRESSURE    Essential hypertension with goal blood pressure less than 140/90       MULTIPLE VITAMINS PO      Take by mouth daily        polyvinyl alcohol 1.4 % ophthalmic solution    ARTIFICIAL TEARS    6 mL    Place 1 drop into the right eye 4 times daily    Epiphora, right, Bell's palsy       REFRESH P.M. Oint     3.5 g    Apply 3.5 g to eye At Bedtime    Bell's palsy, Epiphora, right       simvastatin 40 MG tablet    ZOCOR    90 tablet    Take 1 tablet (40 mg) by mouth every evening for cholesterol.    Hyperlipidemia LDL goal <130       sulindac 200 MG tablet    CLINORIL    60 tablet    Take 1 tablet (200 mg) by mouth 2 times daily (with meals)    Impingement syndrome, shoulder, left, Incomplete tear of left rotator cuff       valACYclovir 1000 mg tablet    VALTREX    21 tablet    Take 1 tablet (1,000 mg) by mouth 3 times daily for 7 days    Left-sided Bell's palsy

## 2018-01-09 ENCOUNTER — OFFICE VISIT (OUTPATIENT)
Dept: FAMILY MEDICINE | Facility: CLINIC | Age: 53
End: 2018-01-09
Payer: COMMERCIAL

## 2018-01-09 VITALS
HEIGHT: 67 IN | OXYGEN SATURATION: 96 % | DIASTOLIC BLOOD PRESSURE: 69 MMHG | SYSTOLIC BLOOD PRESSURE: 122 MMHG | WEIGHT: 194 LBS | HEART RATE: 73 BPM | TEMPERATURE: 99.5 F | BODY MASS INDEX: 30.45 KG/M2

## 2018-01-09 DIAGNOSIS — I10 ESSENTIAL HYPERTENSION WITH GOAL BLOOD PRESSURE LESS THAN 140/90: Primary | ICD-10-CM

## 2018-01-09 DIAGNOSIS — N52.9 ERECTILE DYSFUNCTION, UNSPECIFIED ERECTILE DYSFUNCTION TYPE: ICD-10-CM

## 2018-01-09 DIAGNOSIS — Z12.11 COLON CANCER SCREENING: ICD-10-CM

## 2018-01-09 DIAGNOSIS — E78.5 HYPERLIPIDEMIA LDL GOAL <130: ICD-10-CM

## 2018-01-09 PROCEDURE — 99214 OFFICE O/P EST MOD 30 MIN: CPT | Performed by: FAMILY MEDICINE

## 2018-01-09 RX ORDER — LISINOPRIL 20 MG/1
20 TABLET ORAL DAILY
Qty: 90 TABLET | Refills: 1 | Status: SHIPPED | OUTPATIENT
Start: 2018-01-09 | End: 2019-02-06

## 2018-01-09 RX ORDER — SILDENAFIL CITRATE 20 MG/1
20-100 TABLET ORAL DAILY PRN
Qty: 30 TABLET | Refills: 5 | Status: SHIPPED | OUTPATIENT
Start: 2018-01-09 | End: 2018-08-14

## 2018-01-09 RX ORDER — SIMVASTATIN 40 MG
40 TABLET ORAL EVERY EVENING
Qty: 90 TABLET | Refills: 1 | Status: SHIPPED | OUTPATIENT
Start: 2018-01-09 | End: 2019-02-06

## 2018-01-09 RX ORDER — CHLORTHALIDONE 25 MG/1
25 TABLET ORAL DAILY
Qty: 90 TABLET | Refills: 1 | Status: SHIPPED | OUTPATIENT
Start: 2018-01-09 | End: 2019-02-06

## 2018-01-09 ASSESSMENT — PAIN SCALES - GENERAL: PAINLEVEL: NO PAIN (0)

## 2018-01-09 NOTE — PATIENT INSTRUCTIONS
At [facility], we strive to deliver an exceptional experience to you, every time we see you.  If you receive a survey in the mail, please send us back your thoughts. We really do value your feedback.    Based on your medical history, these are the current health maintenance/preventive care services that you are due for (some may have been done at this visit.)  Health Maintenance Due   Topic Date Due     INFLUENZA VACCINE (SYSTEM ASSIGNED)  09/01/2017         Suggested websites for health information:  Www.Socialcam.org : Up to date and easily searchable information on multiple topics.  Www.medlineplus.gov : medication info, interactive tutorials, watch real surgeries online  Www.familydoctor.org : good info from the Academy of Family Physicians  Www.cdc.gov : public health info, travel advisories, epidemics (H1N1)  Www.aap.org : children's health info, normal development, vaccinations  Www.health.[facility].[place].us : [place] dept of health, public health issues in [place], N1N1    Your care team:                            Family Medicine Internal Medicine   MD Bijan Medrano MD Shantel Branch-Fleming, MD Katya Georgiev PA-C Nam Ho, MD Pediatrics   Jose Amaya PAEMMA Benjamin, CNP Lydia Sylvester APRN CNP   MD Teri Weinberg MD Deborah Mielke, MD Kim Thein, APRN CNP      Clinic hours: Monday - Thursday 7 am-7 pm; Fridays 7 am-5 pm.   Urgent care: Monday - Friday 11 am-9 pm; Saturday and Sunday 9 am-5 pm.  Pharmacy : Monday -Thursday 8 am-8 pm; Friday 8 am-6 pm; Saturday and Sunday 9 am-5 pm.     Clinic: [phone]   Pharmacy: [phone]

## 2018-01-09 NOTE — MR AVS SNAPSHOT
After Visit Summary   1/9/2018    Jose Alejandro Deras    MRN: [id number]           Patient Information     Date Of Birth          1965        Visit Information        Provider Department      1/9/2018 3:40 PM Maurice Barroso MD [facility]        Today's Diagnoses     Essential hypertension with goal blood pressure less than 140/90    -  1    Hyperlipidemia LDL goal <130        Erectile dysfunction, unspecified erectile dysfunction type        Colon cancer screening          Care Instructions    At [facility], we strive to deliver an exceptional experience to you, every time we see you.  If you receive a survey in the mail, please send us back your thoughts. We really do value your feedback.    Based on your medical history, these are the current health maintenance/preventive care services that you are due for (some may have been done at this visit.)  Health Maintenance Due   Topic Date Due     INFLUENZA VACCINE (SYSTEM ASSIGNED)  09/01/2017         Suggested websites for health information:  Www.Lookback.[place].com : Up to date and easily searchable information on multiple topics.  Www.medlineplus.gov : medication info, interactive tutorials, watch real surgeries online  Www.familydoctor.org : good info from the Academy of Family Physicians  Www.cdc.gov : public health info, travel advisories, epidemics (H1N1)  Www.aap.org : children's health info, normal development, vaccinations  Www.health.state.[place].us : [place] dept of health, public health issues in [place], N1N1    Your care team:                            Family Medicine Internal Medicine   MD Bijan Medrano MD Shantel Branch-Fleming, MD Katya Georgiev PA-C Nam Ho, MD Pediatrics   YORDAN Mcdowell CNP Amelia Massimini APRN MD Teri Mojica MD Deborah Mielke, MD Kim Thein, APRN CNP      Clinic hours: Monday - Thursday 7 am-7 pm; Fridays 7 am-5 pm.  "  Urgent care: Monday - Friday 11 am-9 pm; Saturday and Sunday 9 am-5 pm.  Pharmacy : Monday -Thursday 8 am-8 pm; Friday 8 am-6 pm; Saturday and Sunday 9 am-5 pm.     Clinic: [phone]   Pharmacy: [phone]              Follow-ups after your visit        Follow-up notes from your care team     Return in about 6 months (around 7/9/2018) for full physical, blood pressure check, cholesterol.      Future tests that were ordered for you today     Open Future Orders        Priority Expected Expires Ordered    Fecal colorectal cancer screen FIT Routine  1/8/2019 1/9/2018    Basic metabolic panel Routine 1/13/2018 7/9/2018 1/9/2018    ALT Routine 1/13/2018 7/9/2018 1/9/2018    Lipid panel reflex to direct LDL Non-fasting Routine 1/13/2018 7/9/2018 1/9/2018            Who to contact     If you have questions or need follow up information about today's clinic visit or your schedule please contact [facility] directly at [phone].  Normal or non-critical lab and imaging results will be communicated to you by MyChart, letter or phone within 4 business days after the clinic has received the results. If you do not hear from us within 7 days, please contact the clinic through [employer] or phone. If you have a critical or abnormal lab result, we will notify you by phone as soon as possible.  Submit refill requests through [employer] or call your pharmacy and they will forward the refill request to us. Please allow 3 business days for your refill to be completed.          Additional Information About Your Visit        MyChart Information     [employer] lets you send messages to your doctor, view your test results, renew your prescriptions, schedule appointments and more. To sign up, go to [email or web address] . Click on \"Log in\" on the left side of the screen, which will take you to the Welcome page. Then click on \"Sign up Now\" on the right side of the page.     You will be asked to enter the access code " "listed below, as well as some personal information. Please follow the directions to create your username and password.     Your access code is: [email or web address]  Expires: 2018  4:01 PM     Your access code will  in 90 days. If you need help or a new code, please call your [place] clinic or [phone].        Care EveryWhere ID     This is your Care EveryWhere ID. This could be used by other organizations to access your [place] medical records  [id number]        Your Vitals Were     Pulse Temperature Height Pulse Oximetry BMI (Body Mass Index)       73 99.5  F (37.5  C) (Oral) 5' 7\" (1.702 m) 96% 30.38 kg/m2        Blood Pressure from Last 3 Encounters:   18 122/69   10/03/17 121/73   17 126/80    Weight from Last 3 Encounters:   18 194 lb (88 kg)   10/03/17 196 lb (88.9 kg)   17 198 lb (89.8 kg)                 Today's Medication Changes          These changes are accurate as of: 18  4:01 PM.  If you have any questions, ask your nurse or doctor.               Start taking these medicines.        Dose/Directions    sildenafil 20 MG tablet   Commonly known as:  REVATIO   Used for:  Erectile dysfunction, unspecified erectile dysfunction type   Started by:  Maurice Barroso MD        Dose:   mg   Take 1-5 tablets ( mg) by mouth daily as needed , 1-3 hours before intimacy. Never use with nitroglycerin, terazosin or doxazosin.   Quantity:  30 tablet   Refills:  5         These medicines have changed or have updated prescriptions.        Dose/Directions    chlorthalidone 25 MG tablet   Commonly known as:  HYGROTON   This may have changed:  See the new instructions.   Used for:  Essential hypertension with goal blood pressure less than 140/90   Changed by:  Maurice Barroso MD        Dose:  25 mg   Take 1 tablet (25 mg) by mouth daily for blood pressure.   Quantity:  90 tablet   Refills:  1       lisinopril 20 MG tablet   Commonly known as:  PRINIVIL/ZESTRIL   This may " have changed:    - how much to take  - how to take this  - when to take this  - additional instructions   Used for:  Essential hypertension with goal blood pressure less than 140/90   Changed by:  Maurice Barroso MD        Dose:  20 mg   Take 1 tablet (20 mg) by mouth daily for blood pressure.   Quantity:  90 tablet   Refills:  1            Where to get your medicines      These medications were sent to [facility] - [place], [place] - [address] AT [facility] [address], [facility] [address]     Phone:  [phone]     chlorthalidone 25 MG tablet    lisinopril 20 MG tablet    simvastatin 40 MG tablet         Some of these will need a paper prescription and others can be bought over the counter.  Ask your nurse if you have questions.     Bring a paper prescription for each of these medications     sildenafil 20 MG tablet                Primary Care Provider Office Phone # Fax #    Maurice Barroso -[phone] [address]  [facility] [address]        Equal Access to Services     [facility]: Hadii aad ku hadasho Soomaali, waaxda luqadaha, qaybta kaalmada adeegyada, waxay idiin hayjuanjo hoover . So [facility] [phone].    ATENCIÓN: Si habla español, tiene a fung disposición servicios gratTuba City Regional Health Care Corporationos de asistencia lingüística. [facility] [phone].    We comply with applicable federal civil rights laws and [place] laws. We do not discriminate on the basis of race, color, national origin, age, disability, sex, sexual orientation, or gender identity.            Thank you!     Thank you for choosing [facility]  for your care. Our goal is always to provide you with excellent care. Hearing back from our patients is one way we can continue to improve our services. Please take a few minutes to complete the written survey that you may receive in the mail after your visit with us. Thank you!             Your Updated  Medication List - Protect others around you: Learn how to safely use, store and throw away your medicines at www.disposemymeds.org.          This list is accurate as of: 1/9/18  4:01 PM.  Always use your most recent med list.                   Brand Name Dispense Instructions for use Diagnosis    chlorthalidone 25 MG tablet    HYGROTON    90 tablet    Take 1 tablet (25 mg) by mouth daily for blood pressure.    Essential hypertension with goal blood pressure less than 140/90       lisinopril 20 MG tablet    PRINIVIL/ZESTRIL    90 tablet    Take 1 tablet (20 mg) by mouth daily for blood pressure.    Essential hypertension with goal blood pressure less than 140/90       MULTIPLE VITAMINS PO      Take by mouth daily        polyvinyl alcohol 1.4 % ophthalmic solution    ARTIFICIAL TEARS    6 mL    Place 1 drop into the right eye 4 times daily    Epiphora, right, Bell's palsy       REFRESH P.M. Oint     3.5 g    Apply 3.5 g to eye At Bedtime    Bell's palsy, Epiphora, right       sildenafil 20 MG tablet    REVATIO    30 tablet    Take 1-5 tablets ( mg) by mouth daily as needed , 1-3 hours before intimacy. Never use with nitroglycerin, terazosin or doxazosin.    Erectile dysfunction, unspecified erectile dysfunction type       simvastatin 40 MG tablet    ZOCOR    90 tablet    Take 1 tablet (40 mg) by mouth every evening for cholesterol.    Hyperlipidemia LDL goal <130       sulindac 200 MG tablet    CLINORIL    60 tablet    Take 1 tablet (200 mg) by mouth 2 times daily (with meals)    Impingement syndrome, shoulder, left, Incomplete tear of left rotator cuff

## 2018-01-09 NOTE — PROGRESS NOTES
"  SUBJECTIVE:   Jose Alejandro Deras is a 52 year old male who presents to clinic today for the following health issues:      Hypertension Follow-up      Outpatient blood pressures are not being checked.    Low Salt Diet: low salt        Amount of exercise or physical activity: None    Problems taking medications regularly: No    Medication side effects: none    Diet: regular (no restrictions)/low salt      Past medical, family, and social histories, medications, and allergies are reviewed and updated in Epic.     ROS:  C: NEGATIVE for fever, chills, change in weight  E/M: NEGATIVE for ear, mouth and throat problems  R: NEGATIVE for significant cough or SOB  CV: NEGATIVE for chest pain, palpitations or peripheral edema  : Patient mentions ED, previously treated with Viagra. A few years ago he saw an outside provider (independent endo/urology?) who provided some type of hormone treatment or supplement, but he does not recall a specific improvement in the ED from that. Lately he has been taking an OTC supplement reported to increase testosterone levels.   ROS otherwise negative    This document serves as a record of the services and decisions personally performed and made by Dr. Barroso. It was created on his behalf by Zoie Brian, a trained medical scribe. The creation of this document is based the provider's statements to the medical scribe.  Zoie Brian January 9, 2018 3:32 PM     OBJECTIVE:                                                    /69 (BP Location: Left arm, Patient Position: Chair, Cuff Size: Adult Large)  Pulse 73  Temp 99.5  F (37.5  C) (Oral)  Ht 1.702 m (5' 7\")  Wt 88 kg (194 lb)  SpO2 96%  BMI 30.38 kg/m2   Body mass index is 30.38 kg/(m^2).     GENERAL: healthy, alert and no distress  EYES: Eyes grossly normal to inspection, PERRL, EOMI, sclerae white and conjunctivae normal  RESP: lungs clear to auscultation - no crackles or wheezes, no areas of dullness, no tachypnea  CV: " Heart regular rate and rhythm without murmur, click or rub. No peripheral edema and peripheral pulses strong  MS: no gross musculoskeletal defects noted, no edema  SKIN: no suspicious lesions or rashes  NEURO: Normal strength and tone, sensory exam grossly normal, mentation intact, oriented times 3 and cranial nerves 2-12 grossly intact on casual observation (I did not do facial nerve testing today)  PSYCH: mentation appears normal, affect normal/bright     Diagnostic Test Results:  none      ASSESSMENT/PLAN:                                                      (I10) Essential hypertension with goal blood pressure less than 140/90  (primary encounter diagnosis)  Comment: Well controlled.  Plan: chlorthalidone (HYGROTON) 25 MG tablet,         lisinopril (PRINIVIL/ZESTRIL) 20 MG tablet,         Basic metabolic panel        Return in about 6 months (around 7/9/2018) for full physical, blood pressure check, cholesterol.     (E78.5) Hyperlipidemia LDL goal <130  Comment: Historically not at goal. Future labs ordered so he can fast.  Plan: simvastatin (ZOCOR) 40 MG tablet, ALT, Lipid         panel reflex to direct LDL Non-fasting        Follow up in 6 months.    (N52.9) Erectile dysfunction, unspecified erectile dysfunction type  Comment: I think the higher yield approach would be to restart sildenafil rather than looking into testosterone level checking or treatment. His insurance appears to cover 30 tablets per month, but if not, he can take the paper prescription and price this medication at various pharmacies.  Plan: sildenafil (REVATIO) 20 MG tablet          (Z12.11) Colon cancer screening  Comment: FIT due this spring.  Plan: Fecal colorectal cancer screen FIT             The information in this document, created by the medical scribe for me, accurately reflects the services I personally performed and the decisions made by me. I have reviewed and approved this document for accuracy prior to leaving the patient care  area. January 9, 2018 3:32 PM   Maurice Barroso MD

## 2018-01-20 DIAGNOSIS — E78.5 HYPERLIPIDEMIA LDL GOAL <130: ICD-10-CM

## 2018-01-20 DIAGNOSIS — I10 ESSENTIAL HYPERTENSION WITH GOAL BLOOD PRESSURE LESS THAN 140/90: ICD-10-CM

## 2018-01-20 DIAGNOSIS — Z12.11 COLON CANCER SCREENING: ICD-10-CM

## 2018-01-20 LAB — HEMOCCULT STL QL IA: NEGATIVE

## 2018-01-20 PROCEDURE — 84460 ALANINE AMINO (ALT) (SGPT): CPT | Performed by: FAMILY MEDICINE

## 2018-01-20 PROCEDURE — 82274 ASSAY TEST FOR BLOOD FECAL: CPT | Performed by: FAMILY MEDICINE

## 2018-01-20 PROCEDURE — 36415 COLL VENOUS BLD VENIPUNCTURE: CPT | Performed by: FAMILY MEDICINE

## 2018-01-20 PROCEDURE — 80048 BASIC METABOLIC PNL TOTAL CA: CPT | Performed by: FAMILY MEDICINE

## 2018-01-20 PROCEDURE — 80061 LIPID PANEL: CPT | Performed by: FAMILY MEDICINE

## 2018-01-22 LAB
ALT SERPL W P-5'-P-CCNC: 40 U/L (ref 0–70)
ANION GAP SERPL CALCULATED.3IONS-SCNC: 8 MMOL/L (ref 3–14)
BUN SERPL-MCNC: 19 MG/DL (ref 7–30)
CALCIUM SERPL-MCNC: 9.4 MG/DL (ref 8.5–10.1)
CHLORIDE SERPL-SCNC: 104 MMOL/L (ref 94–109)
CHOLEST SERPL-MCNC: 172 MG/DL
CO2 SERPL-SCNC: 28 MMOL/L (ref 20–32)
CREAT SERPL-MCNC: 0.91 MG/DL (ref 0.66–1.25)
GFR SERPL CREATININE-BSD FRML MDRD: 86 ML/MIN/1.7M2
GLUCOSE SERPL-MCNC: 107 MG/DL (ref 70–99)
HDLC SERPL-MCNC: 51 MG/DL
LDLC SERPL CALC-MCNC: 101 MG/DL
NONHDLC SERPL-MCNC: 121 MG/DL
POTASSIUM SERPL-SCNC: 4.1 MMOL/L (ref 3.4–5.3)
SODIUM SERPL-SCNC: 140 MMOL/L (ref 133–144)
TRIGL SERPL-MCNC: 100 MG/DL

## 2018-02-12 ENCOUNTER — OFFICE VISIT (OUTPATIENT)
Dept: FAMILY MEDICINE | Facility: CLINIC | Age: 53
End: 2018-02-12
Payer: COMMERCIAL

## 2018-02-12 VITALS
TEMPERATURE: 99 F | HEIGHT: 67 IN | WEIGHT: 195 LBS | HEART RATE: 83 BPM | OXYGEN SATURATION: 99 % | SYSTOLIC BLOOD PRESSURE: 139 MMHG | DIASTOLIC BLOOD PRESSURE: 78 MMHG | BODY MASS INDEX: 30.61 KG/M2

## 2018-02-12 DIAGNOSIS — M79.661 RIGHT CALF PAIN: ICD-10-CM

## 2018-02-12 DIAGNOSIS — M25.561 ACUTE PAIN OF RIGHT KNEE: Primary | ICD-10-CM

## 2018-02-12 PROCEDURE — 99214 OFFICE O/P EST MOD 30 MIN: CPT | Performed by: FAMILY MEDICINE

## 2018-02-12 ASSESSMENT — PAIN SCALES - GENERAL: PAINLEVEL: SEVERE PAIN (6)

## 2018-02-12 NOTE — PATIENT INSTRUCTIONS
At [facility], we strive to deliver an exceptional experience to you, every time we see you.  If you receive a survey in the mail, please send us back your thoughts. We really do value your feedback.    Based on your medical history, these are the current health maintenance/preventive care services that you are due for (some may have been done at this visit.)  Health Maintenance Due   Topic Date Due     INFLUENZA VACCINE (SYSTEM ASSIGNED)  09/01/2017         Suggested websites for health information:  Www.[employer].org : Up to date and easily searchable information on multiple topics.  Www.[employer].gov : medication info, interactive tutorials, watch real surgeries online  Www.familydoctor.org : good info from the Academy of Family Physicians  Www.cdc.gov : public health info, travel advisories, epidemics (H1N1)  Www.aap.org : children's health info, normal development, vaccinations  Www.health.[facility].[place].us : [place] dept of health, public health issues in [place], N1N1    Your care team:                            Family Medicine Internal Medicine   MD Bijan Medrano MD Shantel Branch-Fleming, MD Katya Georgiev PA-C Megan Hill, APRN CNP    Guilherme Morrison MD Pediatrics   Jose Amaya, PAEMMA Benjamin, CNP Lydia Sylvester APRN CNP   MD Teri Weinberg MD Deborah Mielke, MD Kim Thein, APRN CNP      Clinic hours: Monday - Thursday 7 am-7 pm; Fridays 7 am-5 pm.   Urgent care: Monday - Friday 11 am-9 pm; Saturday and Sunday 9 am-5 pm.  Pharmacy : Monday -Thursday 8 am-8 pm; Friday 8 am-6 pm; Saturday and Sunday 9 am-5 pm.     Clinic: [phone]   Pharmacy: [phone]    Please call the [facility]  [phone] to schedule your right calf ultrasound and right knee MRI.   Knee Pain with Possible Torn Meniscus    The meniscus is a tough cartilage pad that cushions the inside of the knee joint. It helps absorb the shock from movement. It  also spreads the weight of your body evenly across the knee joint. This prevents excess wear and tear to the bones of that joint.  The most common causes of meniscal tears are injuries, especially related to sports and degenerative disease that happens with aging.  A meniscus tear commonly happens during a twisting injury when the knee is bent. This causes pain, swelling, reduced movement of the knee, and trouble walking. There may be popping, clicking, joint locking or inability to completely straighten the knee. Ligaments of the knee may also be injured.  A torn meniscus is diagnosed by physical exam and X-rays. In the case of a severe injury, the knee may be too painful to examine fully. A more accurate exam can be done after the initial swelling goes down. An MRI may be ordered to make a final diagnosis.  If your healthcare provider suspects a meniscal injury, you will treat your knee with ice and rest and preventing movement of the knee. A splint or knee brace that keeps your leg straight may be put on to protect the joint. Depending on the severity of the injury, surgery may be needed. A cartilage injury may take 4-12 weeks to heal depending on how bad it is.  Home care    Stay off the injured leg as much as possible until you can walk on it without pain. If you have a lot of pain when walking, crutches or a walker may be prescribed. (These can be rented or bought at many pharmacies and surgical or orthopedic supply stores). Follow your healthcare provider's advice about when to begin putting weight on that leg.    Keep your leg elevated to reduce pain and swelling. When sleeping, place a pillow under the injured leg. When sitting, support the injured leg so it is level with your waist. This is very important during the first 48 hours.    Apply an ice pack over the injured area for 15 to 20 minutes every 3 to 6 hours. You should do this for the first 24 to 48 hours. You can make an ice pack by filling a  plastic bag that seals at the top with ice cubes and then wrapping it with a thin towel. Continue to use ice packs for relief of pain and swelling as needed. As the ice melts, be careful to avoid getting your wrap, splint, or cast wet. After 48 hours, apply heat (warm shower or warm bath) for 15 to 20 minutes several times a day, or alternate ice and heat. You can place the ice pack directly over the splint. If you have to wear a hook-and-loop knee brace, you can open it to apply the ice pack, or heat, directly to the knee. Never put ice directly on the skin. Always wrap the ice in a towel or other type of cloth.    You may use over-the-counter pain medicine to control pain, unless another pain medicine was prescribed. If you have chronic liver or kidney disease or ever had a stomach ulcer, talk with your healthcare provider before using these medicines.    If you were given a splint, keep it dry at all times. Bathe with your splint out of the water. Protect it with a large plastic bag that is rubber-banded at the top end. If a fiberglass splint gets wet, you can dry it with a hair dryer. If you have a hook-and-loop knee brace, you can remove this to bathe, unless told otherwise.    Check with your healthcare provider before returning to sports or full work duties.  Follow-up care  Follow up with your healthcare provider, or as advised. This is usually within 1-2 weeks. Further testing may be required to check the extent of your injury.  If X-rays were taken, you will be told of any new findings that may affect your care.  Call 911  Call 911 if you have:     Shortness of breath    Chest pain  When to seek medical advice  Call your healthcare provider right away if any of these occur:    Toes or foot gets swollen, cold, blue, numb, or tingly    Pain or swelling spreads over the knee or calf    Warmth or redness appears over the knee or calf    Fever of 100.4 F (38 C) or above lasting for 24 to 48 hours  Date Last  Reviewed: 11/23/2015 2000-2017 [employer]. [address] [facility], [place] [address]. All rights reserved. This information is not intended as a substitute for professional medical care. Always follow your healthcare professional's instructions.        Understanding Deep Vein Thrombosis  Deep vein thrombosis (DVT) is a condition in which a blood clot or thrombus forms in a deep vein. A blood clot is most common in the leg, but can develop in a large vein deep inside the leg, arm, or other part of the body. Part of the clot called an embolus can separate from the vein. It may travel to the lungs and form a pulmonary embolus (PE). This can cut off the flow to a portion of the lung or to the entire lung. A PE is a medical emergency and may cause death. Healthcare providers use the term venous thromboembolism (VTE) to describe the two conditions, DVT and PE. They use the term VTE because the two conditions are very closely related. And, because their prevention and treatment are closely related.  Over time, a blood clot can also permanently damage veins. To protect your health, a blood clot must be treated right away.  How DVT develops  The deep veins of the legs are located in the muscles. These help carry blood clot from the legs to the heart. When leg muscles contract and relax, blood is squeezed through the veins back to the heart. One-way valves inside the veins help keep the blood moving in the right direction. When blood moves too slowly or not at all, it can pool in the veins. This makes a clot more likely to form.    Risk factors  Anyone can develop a blood clot. The following risk factors make a blood clot more likely to happen:    Being inactive for a long period, such as when you re in the hospital, or traveling by plane or car    Injury to a vein from an accident, a broken bone, or surgery    Having blood clots in the past    Personal or family history of a blood-clotting  disorder    Recent surgery    Cancer and certain cancer treatments    Smoking  Other factors can also put you at higher risk for a blood clot. They include:    Age over 60 years    Pregnancy    Taking birth control or hormone replacement    Having other vein problems    Being overweight  Common symptoms  A blood clot does not always cause obvious symptoms. If you do have symptoms, they usually happen suddenly. They may include:    Pain, especially deep in the muscle    Swelling    Aching or tenderness    Red or warm skin    Fever  Call your healthcare provider if you have these symptoms.  Symptoms of pulmonary embolism may include:    Trouble breathing    Fast heartbeat    Chest pain    Sweating    Coughing (may cough up blood)    Fainting  Call 911 if you have these symptoms.   If you take medicine to help prevent blood clots, you have an increased risk of bleeding. Call 911 if you have heavy or uncontrolled bleeding. Call your healthcare provider if you have other signs or symptoms of bleeding like blood in the urine, bleeding with bowel movements, or bleeding from the nose, gums, a cut, or vagina.  Diagnosing DVT  Diagnosis begins with thorough questions about your symptoms and medical history along with a physical exam.  Diagnostic tests include:    An imaging test called a duplex ultrasound. This test uses sound waves to create pictures of veins and blood flow.    Blood tests to check for clotting and other problems.  If your healthcare provider thinks you may have pulmonary embolism, additional testing will be done.  Treating DVT  Treating a blood clot may include:    Medicine to thin the blood and prevent pulmonary embolism and other complications.    Staying in the hospital. This may or may not be necessary.    Surgery for some people, like those who cannot take blood-thinning medicines.  Preventing DVT  Many people who are in the hospital are at an increased risk of developing blood clots. So, preventing  blood clots is an important part of in-hospital care. The care may include getting out of bed regularly, taking medicine, or using special therapies or devices. Other factors and conditions may increase the risk of blood clots. Review your risk with your healthcare provider.  Date Last Reviewed: 5/1/2016 2000-2017 [employer]). [address] [place] [id number]. All rights reserved. This information is not intended as a substitute for professional medical care. Always follow your healthcare professional's instructions.        Understanding Baker s Cyst (Popliteal Cyst)  A Baker s cyst (popliteal cyst) is a fluid-filled sac that forms behind the knee.  Parts of the knee  The knee is a complex joint that has many parts. The lower end of the thighbone (femur) rotates on the upper end of the shinbone (tibia). There are several small bursae around the knee joint. These are small sacs filled with a special fluid (synovial fluid) that cushions the rest of the joint. Between the bones is a space that also contains this fluid.  What causes a Baker s cyst?  It is caused when extra fluid from the knee joint flows into the small bursa that sits behind the knee. When this sac fills with too much fluid, it s called a Baker s cyst. This might happen when an injury or disease irritates the knee joint.   In adults, other problems with the knee joint often cause the Baker s cyst. Injury or a knee disorder can change the normal structure of the knee joint. This can cause a cyst to form.  The synovial fluid inside the joint space may build up as a result of injury or disease. As the pressure builds up, the fluid may bulge into the back of the knee. This can cause the cyst.  Symptoms of a Baker s cyst  A Baker s cyst often doesn t cause symptoms. A cyst will more often be seen on an imaging test, like MRI, done for other reasons. If you do have symptoms, they may include:    Pain in the back of the knee    Knee  stiffness    Sense of swelling or fullness behind the knee, especially when you straighten your leg    A swelling behind the knee that goes away when you bend your knee  These symptoms tend to get worse when standing for a long time, or being active.  Diagnosing a Baker s cyst  Your healthcare provider will ask you about your medical history and your symptoms. He or she will give you a physical exam, which will include a careful exam of your knee. It s important to make sure your symptoms are caused by a Baker s cyst and not a tumor or a blood clot.  If the cause of your symptoms is not clear, you may have imaging tests, such as:    Ultrasound, to look at the cyst in more detail    X-ray, to get more information about the bones of the joint    MRI, if the diagnosis is still unclear after ultrasound, or if your health care provider is considering surgery  Date Last Reviewed: 4/1/2017 2000-2017 [employer]. [address] [place] [address]. All rights reserved. This information is not intended as a substitute for professional medical care. Always follow your healthcare professional's instructions.        Wu s Cyst    You have a Baker s cyst. This is a lump or bulge in the back of your knee. It is caused when extra joint fluid flows into a small sac behind the knee. The extra fluid occurs because arthritis or a torn cartilage irritates the knee joint.  A small Wu s cyst often has no symptoms. A larger cyst can cause knee pain or a feeling of pressure behind your knee when you try to fully straighten or bend that joint. A Baker s cyst can leak, leading fluid to move down into your lower leg. This causes swelling, pain, and redness.  Treatment involves draining the extra fluid. Or medicine may be injected to reduce redness and swelling. If the extra fluid is caused by a torn cartilage, then surgery to repair the cartilage may be the best treatment option. If arthritis is the cause, and it  does not get better with treatment, surgery can be done to remove the cyst.  Home care    If you have knee pain, stay off the affected leg as much as possible until the pain eases.    Apply an ice pack to the painful area for no more than 20 minutes. Do this every 3 to 6 hours for the first 24 to 48 hours. Keep using ice packs 3 to 4 times a day for the next few days, as needed for pain. To make an ice pack, put ice cubes in a sealed zip-lock plastic bag. Wrap the bag in a clean, thin towel or cloth. Never put ice or an ice pack directly on the skin.    If you were given a hook-and-loop knee brace, you may open the brace to apply ice. Unless told otherwise, you may remove the brace to bathe and sleep.    You may use over-the-counter pain medicine to control pain, unless another medicine was prescribed. Talk with your provider before using these medicines if you have chronic liver or kidney disease, or have ever had a stomach ulcer or GI (gastrointestinal) bleeding.       If crutches or a walker have been recommended, don t bear full weight on your injured leg until you can do so without pain. Check with your provider before returning to sports or full work duties.  Follow-up care  Follow up with your healthcare provider within 1 to 2 weeks, or as advised.  If X-rays were taken, you will be notified of any new findings that may affect your care.  When to seek medical advice  Call your healthcare provider right away if any of these occur:    Toes or foot become swollen, cold, blue, numb or tingly    Pain or swelling increases    Warmth or redness appears over the knee    Redness, swelling or pain occurs in the calf or lower leg  Date Last Reviewed: 11/20/2015 2000-2017 [employer]. [address] [place] [address], [place] [address]. All rights reserved. This information is not intended as a substitute for professional medical care. Always follow your healthcare professional's instructions.

## 2018-02-12 NOTE — PROGRESS NOTES
"  SUBJECTIVE:   Jose Alejandro Deras is a 52 year old male who presents to clinic today for the following health issues:      Joint Pain    Onset: about 2 weeks    Description:   Location: right knee  Character: Dull ache    Intensity: 6/10    Progression of Symptoms: worse    Accompanying Signs & Symptoms:  Other symptoms: radiation of pain to back of knee (started 2 days ago) & calf (started yesterday) and swelling, calf feels \"hard\"    History:   Previous similar pain: no       Precipitating factors:   Trauma or overuse: maybe - uses an incline on treadmill at the gym (but no changes in this regimen preceding his symptoms)    Alleviating factors:  Improved by: Ibuprofen    Therapies Tried and outcome: IBU & Ointment      Past medical, family, and social histories, medications, and allergies are reviewed and updated in Epic.     ROS:  C: NEGATIVE for fever, chills, change in weight  E/M: NEGATIVE for ear, mouth and throat problems  R: NEGATIVE for significant cough or SOB  CV: NEGATIVE for chest pain, palpitations or peripheral edema  ROS otherwise negative    This document serves as a record of the services and decisions personally performed and made by Dr. Barroso. It was created on his behalf by Zoie Brian, a trained medical scribe. The creation of this document is based the provider's statements to the medical scribe.  Zoie Brian February 12, 2018 11:35 AM     OBJECTIVE:                                                    /78 (BP Location: Left arm, Patient Position: Chair, Cuff Size: Adult Large)  Pulse 83  Temp 99  F (37.2  C) (Oral)  Ht 1.702 m (5' 7\")  Wt 88.5 kg (195 lb)  SpO2 99%  BMI 30.54 kg/m2   Body mass index is 30.54 kg/(m^2).     GENERAL: healthy, alert and no distress  EYES: Eyes grossly normal to inspection, PERRL, EOMI, sclerae white and conjunctivae normal  MS: Tenderness of the right knee laterally along the joint line. He also has some focal tenderness in the popliteal " fossa. R calf circumference: 42 cm; L calf circumference: 38 cm.  SKIN: no suspicious lesions or rashes  NEURO: Normal strength and tone, sensory exam grossly normal, mentation intact, oriented times 3 and cranial nerves 2-12 intact  PSYCH: mentation appears normal, affect normal/bright     Diagnostic Test Results:  none      ASSESSMENT/PLAN:                                                      (M25.561) Acute pain of right knee  (primary encounter diagnosis)  Comment: Rule out LMT, popliteal cyst, etc.   Plan: ORTHO  REFERRAL, MR Knee Right w/o         Contrast        Handouts provided.    (M67.146) Right calf pain  Comment: Rule out DVT, but this does not seem very likely.   Plan: US Lower Extremity Venous Duplex Right        Handouts provided.       The information in this document, created by the medical scribe for me, accurately reflects the services I personally performed and the decisions made by me. I have reviewed and approved this document for accuracy prior to leaving the patient care area. February 12, 2018 11:35 AM   Maurice Barroso MD

## 2018-02-12 NOTE — MR AVS SNAPSHOT
After Visit Summary   2/12/2018    Jose Alejandro Deras    MRN: [id number]           Patient Information     Date Of Birth          1965        Visit Information        Provider Department      2/12/2018 11:00 AM Maurice Barroso MD [facility]        Today's Diagnoses     Acute pain of right knee    -  1    Right calf pain          Care Instructions    At [facility], we strive to deliver an exceptional experience to you, every time we see you.  If you receive a survey in the mail, please send us back your thoughts. We really do value your feedback.    Based on your medical history, these are the current health maintenance/preventive care services that you are due for (some may have been done at this visit.)  Health Maintenance Due   Topic Date Due     INFLUENZA VACCINE (SYSTEM ASSIGNED)  09/01/2017         Suggested websites for health information:  Www.[employer] : Up to date and easily searchable information on multiple topics.  Www.[employer].gov : medication info, interactive tutorials, watch real surgeries online  Www.familydoctor.org : good info from the Academy of Family Physicians  Www.cdc.gov : public health info, travel advisories, epidemics (H1N1)  Www.aap.org : children's health info, normal development, vaccinations  Www.health.[facility].[place].us : [place] dept of health, public health issues in [place], N1N1    Your care team:                            Family Medicine Internal Medicine   MD Bijan Medrano MD Shantel Branch-Fleming, MD Katya Georgiev PA-C Megan Hill, EVARISTO Morrison MD Pediatrics   YORDAN Mcdowell, DUY Sylvester APRN CNP   MD Teri Weinberg MD Deborah Mielke, MD Kim Thein, APRN CNP      Clinic hours: Monday - Thursday 7 am-7 pm; Fridays 7 am-5 pm.   Urgent care: Monday - Friday 11 am-9 pm; Saturday and Sunday 9 am-5 pm.  Pharmacy : Monday -Thursday 8 am-8 pm; Friday 8  am-6 pm; Saturday and Sunday 9 am-5 pm.     Clinic: [phone]   Pharmacy: [phone]    Please call the [facility]  [phone] to schedule your right calf ultrasound and right knee MRI.   Knee Pain with Possible Torn Meniscus    The meniscus is a tough cartilage pad that cushions the inside of the knee joint. It helps absorb the shock from movement. It also spreads the weight of your body evenly across the knee joint. This prevents excess wear and tear to the bones of that joint.  The most common causes of meniscal tears are injuries, especially related to sports and degenerative disease that happens with aging.  A meniscus tear commonly happens during a twisting injury when the knee is bent. This causes pain, swelling, reduced movement of the knee, and trouble walking. There may be popping, clicking, joint locking or inability to completely straighten the knee. Ligaments of the knee may also be injured.  A torn meniscus is diagnosed by physical exam and X-rays. In the case of a severe injury, the knee may be too painful to examine fully. A more accurate exam can be done after the initial swelling goes down. An MRI may be ordered to make a final diagnosis.  If your healthcare provider suspects a meniscal injury, you will treat your knee with ice and rest and preventing movement of the knee. A splint or knee brace that keeps your leg straight may be put on to protect the joint. Depending on the severity of the injury, surgery may be needed. A cartilage injury may take 4-12 weeks to heal depending on how bad it is.  Home care    Stay off the injured leg as much as possible until you can walk on it without pain. If you have a lot of pain when walking, crutches or a walker may be prescribed. (These can be rented or bought at many pharmacies and surgical or orthopedic supply stores). Follow your healthcare provider's advice about when to begin putting weight on that leg.    Keep your  leg elevated to reduce pain and swelling. When sleeping, place a pillow under the injured leg. When sitting, support the injured leg so it is level with your waist. This is very important during the first 48 hours.    Apply an ice pack over the injured area for 15 to 20 minutes every 3 to 6 hours. You should do this for the first 24 to 48 hours. You can make an ice pack by filling a plastic bag that seals at the top with ice cubes and then wrapping it with a thin towel. Continue to use ice packs for relief of pain and swelling as needed. As the ice melts, be careful to avoid getting your wrap, splint, or cast wet. After 48 hours, apply heat (warm shower or warm bath) for 15 to 20 minutes several times a day, or alternate ice and heat. You can place the ice pack directly over the splint. If you have to wear a hook-and-loop knee brace, you can open it to apply the ice pack, or heat, directly to the knee. Never put ice directly on the skin. Always wrap the ice in a towel or other type of cloth.    You may use over-the-counter pain medicine to control pain, unless another pain medicine was prescribed. If you have chronic liver or kidney disease or ever had a stomach ulcer, talk with your healthcare provider before using these medicines.    If you were given a splint, keep it dry at all times. Bathe with your splint out of the water. Protect it with a large plastic bag that is rubber-banded at the top end. If a fiberglass splint gets wet, you can dry it with a hair dryer. If you have a hook-and-loop knee brace, you can remove this to bathe, unless told otherwise.    Check with your healthcare provider before returning to sports or full work duties.  Follow-up care  Follow up with your healthcare provider, or as advised. This is usually within 1-2 weeks. Further testing may be required to check the extent of your injury.  If X-rays were taken, you will be told of any new findings that may affect your care.  Call 911  Call  911 if you have:     Shortness of breath    Chest pain  When to seek medical advice  Call your healthcare provider right away if any of these occur:    Toes or foot gets swollen, cold, blue, numb, or tingly    Pain or swelling spreads over the knee or calf    Warmth or redness appears over the knee or calf    Fever of 100.4 F (38 C) or above lasting for 24 to 48 hours  Date Last Reviewed: 11/23/2015 2000-2017 [employer]. [address] [place] [address]. All rights reserved. This information is not intended as a substitute for professional medical care. Always follow your healthcare professional's instructions.        Understanding Deep Vein Thrombosis  Deep vein thrombosis (DVT) is a condition in which a blood clot or thrombus forms in a deep vein. A blood clot is most common in the leg, but can develop in a large vein deep inside the leg, arm, or other part of the body. Part of the clot called an embolus can separate from the vein. It may travel to the lungs and form a pulmonary embolus (PE). This can cut off the flow to a portion of the lung or to the entire lung. A PE is a medical emergency and may cause death. Healthcare providers use the term venous thromboembolism (VTE) to describe the two conditions, DVT and PE. They use the term VTE because the two conditions are very closely related. And, because their prevention and treatment are closely related.  Over time, a blood clot can also permanently damage veins. To protect your health, a blood clot must be treated right away.  How DVT develops  The deep veins of the legs are located in the muscles. These help carry blood clot from the legs to the heart. When leg muscles contract and relax, blood is squeezed through the veins back to the heart. One-way valves inside the veins help keep the blood moving in the right direction. When blood moves too slowly or not at all, it can pool in the veins. This makes a clot more likely to  form.    Risk factors  Anyone can develop a blood clot. The following risk factors make a blood clot more likely to happen:    Being inactive for a long period, such as when you re in the hospital, or traveling by plane or car    Injury to a vein from an accident, a broken bone, or surgery    Having blood clots in the past    Personal or family history of a blood-clotting disorder    Recent surgery    Cancer and certain cancer treatments    Smoking  Other factors can also put you at higher risk for a blood clot. They include:    Age over 60 years    Pregnancy    Taking birth control or hormone replacement    Having other vein problems    Being overweight  Common symptoms  A blood clot does not always cause obvious symptoms. If you do have symptoms, they usually happen suddenly. They may include:    Pain, especially deep in the muscle    Swelling    Aching or tenderness    Red or warm skin    Fever  Call your healthcare provider if you have these symptoms.  Symptoms of pulmonary embolism may include:    Trouble breathing    Fast heartbeat    Chest pain    Sweating    Coughing (may cough up blood)    Fainting  Call 911 if you have these symptoms.   If you take medicine to help prevent blood clots, you have an increased risk of bleeding. Call 911 if you have heavy or uncontrolled bleeding. Call your healthcare provider if you have other signs or symptoms of bleeding like blood in the urine, bleeding with bowel movements, or bleeding from the nose, gums, a cut, or vagina.  Diagnosing DVT  Diagnosis begins with thorough questions about your symptoms and medical history along with a physical exam.  Diagnostic tests include:    An imaging test called a duplex ultrasound. This test uses sound waves to create pictures of veins and blood flow.    Blood tests to check for clotting and other problems.  If your healthcare provider thinks you may have pulmonary embolism, additional testing will be done.  Treating DVT  Treating a  blood clot may include:    Medicine to thin the blood and prevent pulmonary embolism and other complications.    Staying in the hospital. This may or may not be necessary.    Surgery for some people, like those who cannot take blood-thinning medicines.  Preventing DVT  Many people who are in the hospital are at an increased risk of developing blood clots. So, preventing blood clots is an important part of in-hospital care. The care may include getting out of bed regularly, taking medicine, or using special therapies or devices. Other factors and conditions may increase the risk of blood clots. Review your risk with your healthcare provider.  Date Last Reviewed: 5/1/2016 2000-2017 [employer]. [address] [place] [address], [place] [address]. All rights reserved. This information is not intended as a substitute for professional medical care. Always follow your healthcare professional's instructions.                Follow-ups after your visit        Additional Services     ORTHO  REFERRAL       [facility] is referring you to the Orthopedic  Services at [facility].       The  Representative will assist you in the coordination of your Orthopedic and Musculoskeletal Care as prescribed by your physician.    The  Representative will call you within 1 business day to help schedule your appointment, or you may contact the  Representative at:    All areas ~ [phone]     Type of Referral : Surgical / Specialist (has seen Dr. [facility] in the past.)      Timeframe requested: Within 2 weeks    Coverage of these services is subject to the terms and limitations of your health insurance plan.  Please call member services at your health plan with any benefit or coverage questions.      If X-rays, CT or MRI's have been performed, please contact the facility where they were done to arrange for , prior to your scheduled appointment.   "Please bring this referral request to your appointment and present it to your specialist.                  Future tests that were ordered for you today     Open Future Orders        Priority Expected Expires Ordered    US Lower Extremity Venous Duplex Right STAT  2019    MR Knee Right w/o Contrast Routine  2019            Who to contact     If you have questions or need follow up information about today's clinic visit or your schedule please contact [facility] directly at [phone].  Normal or non-critical lab and imaging results will be communicated to you by [employer], letter or phone within 4 business days after the clinic has received the results. If you do not hear from us within 7 days, please contact the clinic through [employer] or phone. If you have a critical or abnormal lab result, we will notify you by phone as soon as possible.  Submit refill requests through [employer] or call your pharmacy and they will forward the refill request to us. Please allow 3 business days for your refill to be completed.          Additional Information About Your Visit        [employer] Information     [employer] lets you send messages to your doctor, view your test results, renew your prescriptions, schedule appointments and more. To sign up, go to [email or web address] . Click on \"Log in\" on the left side of the screen, which will take you to the Welcome page. Then click on \"Sign up Now\" on the right side of the page.     You will be asked to enter the access code listed below, as well as some personal information. Please follow the directions to create your username and password.     Your access code is: [email or web address]  Expires: 2018  4:01 PM     Your access code will  in 90 days. If you need help or a new code, please call your [place] clinic or [phone].        Care EveryWhere ID     This is your Care EveryWhere ID. This could be used by other organizations to access " "your [place] medical records  [id number]        Your Vitals Were     Pulse Temperature Height Pulse Oximetry BMI (Body Mass Index)       83 99  F (37.2  C) (Oral) 1.702 m (5' 7\") 99% 30.54 kg/m2        Blood Pressure from Last 3 Encounters:   02/12/18 139/78   01/09/18 122/69   10/03/17 121/73    Weight from Last 3 Encounters:   02/12/18 88.5 kg (195 lb)   01/09/18 88 kg (194 lb)   10/03/17 88.9 kg (196 lb)              We Performed the Following     ORTHO  REFERRAL        Primary Care Provider Office Phone # Fax #    Maurice Barroso [phone]       [address]  [facility] [address]        Equal Access to Services     [facility] : Hadii aad ku hadasho Soomaali, waaxda luqadaha, qaybta kaalmada adeegyada, waxay alicein hayjuanjo hoover . So [facility] [phone].    ATENCIÓN: Si habla español, tiene a fung disposición servicios gratuitos de asistencia lingüística. Barrett al [phone].    We comply with applicable federal civil rights laws and [place] laws. We do not discriminate on the basis of race, color, national origin, age, disability, sex, sexual orientation, or gender identity.            Thank you!     Thank you for choosing [facility]  for your care. Our goal is always to provide you with excellent care. Hearing back from our patients is one way we can continue to improve our services. Please take a few minutes to complete the written survey that you may receive in the mail after your visit with us. Thank you!             Your Updated Medication List - Protect others around you: Learn how to safely use, store and throw away your medicines at www.disposemymeds.org.          This list is accurate as of 2/12/18 11:44 AM.  Always use your most recent med list.                   Brand Name Dispense Instructions for use Diagnosis    chlorthalidone 25 MG tablet    HYGROTON    90 tablet    Take 1 tablet (25 mg) by mouth daily for blood pressure. "    Essential hypertension with goal blood pressure less than 140/90       lisinopril 20 MG tablet    PRINIVIL/ZESTRIL    90 tablet    Take 1 tablet (20 mg) by mouth daily for blood pressure.    Essential hypertension with goal blood pressure less than 140/90       MULTIPLE VITAMINS PO      Take by mouth daily        polyvinyl alcohol 1.4 % ophthalmic solution    ARTIFICIAL TEARS    6 mL    Place 1 drop into the right eye 4 times daily    Epiphora, right, Bell's palsy       REFRESH P.M. Oint     3.5 g    Apply 3.5 g to eye At Bedtime    Bell's palsy, Epiphora, right       sildenafil 20 MG tablet    REVATIO    30 tablet    Take 1-5 tablets ( mg) by mouth daily as needed , 1-3 hours before intimacy. Never use with nitroglycerin, terazosin or doxazosin.    Erectile dysfunction, unspecified erectile dysfunction type       simvastatin 40 MG tablet    ZOCOR    90 tablet    Take 1 tablet (40 mg) by mouth every evening for cholesterol.    Hyperlipidemia LDL goal <130       sulindac 200 MG tablet    CLINORIL    60 tablet    Take 1 tablet (200 mg) by mouth 2 times daily (with meals)    Impingement syndrome, shoulder, left, Incomplete tear of left rotator cuff

## 2018-02-14 ENCOUNTER — RADIANT APPOINTMENT (OUTPATIENT)
Dept: MRI IMAGING | Facility: CLINIC | Age: 53
End: 2018-02-14
Attending: FAMILY MEDICINE
Payer: COMMERCIAL

## 2018-02-14 ENCOUNTER — RADIANT APPOINTMENT (OUTPATIENT)
Dept: ULTRASOUND IMAGING | Facility: CLINIC | Age: 53
End: 2018-02-14
Attending: FAMILY MEDICINE
Payer: COMMERCIAL

## 2018-02-14 DIAGNOSIS — M79.661 RIGHT CALF PAIN: ICD-10-CM

## 2018-02-14 DIAGNOSIS — M25.561 ACUTE PAIN OF RIGHT KNEE: ICD-10-CM

## 2018-02-14 PROCEDURE — 93971 EXTREMITY STUDY: CPT | Mod: RT

## 2018-02-14 PROCEDURE — 73721 MRI JNT OF LWR EXTRE W/O DYE: CPT | Mod: TC

## 2018-02-16 ENCOUNTER — TELEPHONE (OUTPATIENT)
Dept: FAMILY MEDICINE | Facility: CLINIC | Age: 53
End: 2018-02-16

## 2018-02-16 NOTE — TELEPHONE ENCOUNTER
Notes Recorded by Maurice Barroso MD on 2/16/2018 at 3:44 PM  Mr. Deras,    Your MRI showed a ruptured or leaking Baker's cyst behind the knee. This is the cause for the pain in the back side of the knee joint as well as the swelling in your calf. You may want to see an orthopaedist if the pain and swelling do not resolve on their own after awhile.     Please contact the clinic if you have additional questions.  Thank you.    Sincerely,      Maurice Barroso MD    This writer attempted to contact [facility] on 02/16/18      Reason for call results and left message that clinic will return call.      If patient calls back:   Patient contacted by clinic RN team. Inform patient that someone from the team will contact them, document that pt called and route to care team.         Liane Lomeli RN

## 2018-02-20 NOTE — TELEPHONE ENCOUNTER
Patient contacted and informed of the below per provider documentation. Patient verbalizes understanding.     Liane Lomeli RN

## 2018-02-21 ENCOUNTER — OFFICE VISIT (OUTPATIENT)
Dept: ORTHOPEDICS | Facility: CLINIC | Age: 53
End: 2018-02-21
Payer: COMMERCIAL

## 2018-02-21 ENCOUNTER — RADIANT APPOINTMENT (OUTPATIENT)
Dept: GENERAL RADIOLOGY | Facility: CLINIC | Age: 53
End: 2018-02-21
Attending: ORTHOPAEDIC SURGERY
Payer: COMMERCIAL

## 2018-02-21 VITALS — BODY MASS INDEX: 30.61 KG/M2 | HEIGHT: 67 IN | RESPIRATION RATE: 16 BRPM | WEIGHT: 195 LBS

## 2018-02-21 DIAGNOSIS — M25.561 ACUTE PAIN OF RIGHT KNEE: Primary | ICD-10-CM

## 2018-02-21 DIAGNOSIS — M66.0 RUPTURE OF POPLITEAL CYST: ICD-10-CM

## 2018-02-21 DIAGNOSIS — M94.261 CHONDROMALACIA, KNEE, RIGHT: ICD-10-CM

## 2018-02-21 PROCEDURE — 73562 X-RAY EXAM OF KNEE 3: CPT | Mod: RT

## 2018-02-21 PROCEDURE — 99214 OFFICE O/P EST MOD 30 MIN: CPT | Performed by: ORTHOPAEDIC SURGERY

## 2018-02-21 RX ORDER — IBUPROFEN 800 MG/1
800 TABLET, FILM COATED ORAL EVERY 8 HOURS PRN
Qty: 90 TABLET | Refills: 1 | Status: SHIPPED | OUTPATIENT
Start: 2018-02-21 | End: 2020-04-20

## 2018-02-21 ASSESSMENT — PAIN SCALES - GENERAL: PAINLEVEL: MILD PAIN (3)

## 2018-02-21 NOTE — NURSING NOTE
"Chief Complaint   Patient presents with     Knee right     Right knee pain x 3-4 week No injury. Uses an incline on treadmill at the gym. He has swelling on the knee. calf feels hard.       Initial Resp 16  Ht 1.702 m (5' 7\")  Wt 88.5 kg (195 lb)  BMI 30.54 kg/m2 Estimated body mass index is 30.54 kg/(m^2) as calculated from the following:    Height as of this encounter: 1.702 m (5' 7\").    Weight as of this encounter: 88.5 kg (195 lb).  Medication Reconciliation: complete   Ella Paulson MA      "

## 2018-02-21 NOTE — MR AVS SNAPSHOT
After Visit Summary   2/21/2018    Jose Alejandro Deras    MRN: [id number]           Patient Information     Date Of Birth          1965        Visit Information        Provider Department      2/21/2018 3:45 PM Tj Monroe MD [facility]        Today's Diagnoses     Acute pain of right knee    -  1    Rupture of popliteal cyst        Chondromalacia, knee, right          Care Instructions    Please remember to call and schedule a follow up appointment if one was recommended at your earliest convenience.   Orthopedics CLINIC HOURS TELEPHONE NUMBER   Efe Landeros M.D.      Ella Paulson  Medical Assistant Tuesday 8 am -5 pm    Wednesday 8 am -1 pm    Thursday 8 am -5 pm   Specialty schedulers:   [phone] to make an appointment with any Specialty Provider.   Main Clinic:   [phone] to make an appointment with your primary provider   Urgent Care locations:    [facility] Monday-Friday 5 pm - 9 pm  Saturday-Sunday 9 am -5pm      Monday-Friday 11 am - 9 pm  Saturday 9 am - 5 pm [phone]     If SURGERY has been recommended, please call our Specialty Schedulers at [phone] to schedule your procedure.    If you need a medication refill, please contact your pharmacy. Please allow 3 business days for your refill to be completed.  Use TERMINALFOUR (secure e-mail communication and access to your chart) to send a message or to make an appointment. Please ask how you can sign up for [employer].          Follow-ups after your visit        Follow-up notes from your care team     Return if symptoms worsen or fail to improve.      Who to contact     If you have questions or need follow up information about today's clinic visit or your schedule please contact [facility] directly at [phone].  Normal or non-critical lab and imaging results will be communicated to you by MyChart, letter or phone within 4 business  "days after the clinic has received the results. If you do not hear from us within 7 days, please contact the clinic through Tail or phone. If you have a critical or abnormal lab result, we will notify you by phone as soon as possible.  Submit refill requests through Tail or call your pharmacy and they will forward the refill request to us. Please allow 3 business days for your refill to be completed.          Additional Information About Your Visit        Tail Information     Tail lets you send messages to your doctor, view your test results, renew your prescriptions, schedule appointments and more. To sign up, go to [email or web address] . Click on \"Log in\" on the left side of the screen, which will take you to the Welcome page. Then click on \"Sign up Now\" on the right side of the page.     You will be asked to enter the access code listed below, as well as some personal information. Please follow the directions to create your username and password.     Your access code is: -[email or web address]  Expires: 2018  4:01 PM     Your access code will  in 90 days. If you need help or a new code, please call your [facility] clinic or [phone].        Care EveryWhere ID     This is your Care EveryWhere ID. This could be used by other organizations to access your [facility] medical records  [id number]        Your Vitals Were     Respirations Height BMI (Body Mass Index)             16 5' 7\" (1.702 m) 30.54 kg/m2          Blood Pressure from Last 3 Encounters:   18 139/78   18 122/69   10/03/17 121/73    Weight from Last 3 Encounters:   18 195 lb (88.5 kg)   18 195 lb (88.5 kg)   18 194 lb (88 kg)              We Performed the Following     XR Knee Right 3 Views          Today's Medication Changes          These changes are accurate as of 18 11:59 PM.  If you have any questions, ask your nurse or doctor.               Start taking these medicines.        Dose/Directions    " ibuprofen 800 MG tablet   Commonly known as:  ADVIL/MOTRIN   Used for:  Acute pain of right knee, Rupture of popliteal cyst, Chondromalacia, knee, right   Started by:  Tj Monroe MD        Dose:  800 mg   Take 1 tablet (800 mg) by mouth every 8 hours as needed for moderate pain   Quantity:  90 tablet   Refills:  1            Where to get your medicines      These medications were sent to [id number] - [facility], [place] - [address] AT [facility] [address], [facility] [address]     Phone:  [phone]     ibuprofen 800 MG tablet                Primary Care Provider Office Phone # Fax #    Maurice Barroso [phone] [address]  [facility] [address]        Equal Access to Services     [facility]: Hadii aad ku hadasho Soomaali, waaxda luqadaha, qaybta kaalmada adeegyada, waxay idiin hayaan lonnie kharash sneha . So [facility] [place] [phone].    ATENCIÓN: Si habla español, tiene a fung disposición servicios gratuitos de asistencia lingüística. Llame al [phone].    We comply with applicable federal civil rights laws and [place] laws. We do not discriminate on the basis of race, color, national origin, age, disability, sex, sexual orientation, or gender identity.            Thank you!     Thank you for choosing [facility]  for your care. Our goal is always to provide you with excellent care. Hearing back from our patients is one way we can continue to improve our services. Please take a few minutes to complete the written survey that you may receive in the mail after your visit with us. Thank you!             Your Updated Medication List - Protect others around you: Learn how to safely use, store and throw away your medicines at www.disposemymeds.org.          This list is accurate as of 2/21/18 11:59 PM.  Always use your most recent med list.                   Brand Name Dispense Instructions for use Diagnosis     chlorthalidone 25 MG tablet    HYGROTON    90 tablet    Take 1 tablet (25 mg) by mouth daily for blood pressure.    Essential hypertension with goal blood pressure less than 140/90       ibuprofen 800 MG tablet    ADVIL/MOTRIN    90 tablet    Take 1 tablet (800 mg) by mouth every 8 hours as needed for moderate pain    Acute pain of right knee, Rupture of popliteal cyst, Chondromalacia, knee, right       lisinopril 20 MG tablet    PRINIVIL/ZESTRIL    90 tablet    Take 1 tablet (20 mg) by mouth daily for blood pressure.    Essential hypertension with goal blood pressure less than 140/90       MULTIPLE VITAMINS PO      Take by mouth daily        polyvinyl alcohol 1.4 % ophthalmic solution    ARTIFICIAL TEARS    6 mL    Place 1 drop into the right eye 4 times daily    Epiphora, right, Bell's palsy       REFRESH P.M. Oint     3.5 g    Apply 3.5 g to eye At Bedtime    Bell's palsy, Epiphora, right       sildenafil 20 MG tablet    REVATIO    30 tablet    Take 1-5 tablets ( mg) by mouth daily as needed , 1-3 hours before intimacy. Never use with nitroglycerin, terazosin or doxazosin.    Erectile dysfunction, unspecified erectile dysfunction type       simvastatin 40 MG tablet    ZOCOR    90 tablet    Take 1 tablet (40 mg) by mouth every evening for cholesterol.    Hyperlipidemia LDL goal <130       sulindac 200 MG tablet    CLINORIL    60 tablet    Take 1 tablet (200 mg) by mouth 2 times daily (with meals)    Impingement syndrome, shoulder, left, Incomplete tear of left rotator cuff

## 2018-02-21 NOTE — PROGRESS NOTES
"chief complaint:   Chief Complaint   Patient presents with     Knee right     Right knee pain x 3-4 week No injury. Uses an incline on treadmill at the gym. He has swelling on the knee. calf feels hard.     Jose Alejandro Deras is seen today in the [facility] for evaluation of right knee pain at the request of Maurice Melo        HISTORY OF PRESENT ILLNESS    Jose Alejandro \"CARLENE\" LIBBY Deras is a 52 year old male seen for evaluation of a right knee pain that occurred about 1 month ago with no known injury. He was using an incline on the treadmill at the gym. He started to notice swelling in the knee. Also felt the calf feel hard with pain. Was seen by his primary doctor, who ordered an MRI and DVT scan. DVT scan negative for blood clot. Since onset, he continues to have pain. Pain is mild today, rated a 3/10. He has soreness diffusely in the knee, with more pain being posterior. Some swelling over the lateral and posterior knee.    Present symptoms: mild pain, + swelling.  Denies locking, catching or giving way.  Symptoms occur with activity .    The frequency of symptoms frequently.  Pain severity: 3/10  Pain quality: dull and aching  Exacerbating Factors: with weightbearing and squatting  Relieving Factors: rest  Night Pain: No  Pain while at rest: No   Patient has tried:     NSAIDS: Yes      Physical Therapy: No      Activity modification: Yes      Bracing: No      Injections: No     Ice: Yes      Other: none      Usual level of recreational activity: very athletic  Usual level of work activity:.    Orthopedic PMH: known left shoulder rotator cuff tear    Other PMH:  has a past medical history of Erectile dysfunction; Essential hypertension, benign; Hyperlipidemia LDL goal <130; Hypotestosteronism; IGT (impaired glucose tolerance); Left-sided Bell's palsy (09/19/2017); Obesity, Class I, BMI 30-34.9 (8/23/2013); Overweight (BMI 25.0-29.9) (8/23/2013); Right-sided " Bell's palsy (05/11/2017); and Vitamin D deficiency.  Patient Active Problem List    Diagnosis Date Noted     Bell's palsy 05/11/2017     Priority: Medium     Impingement syndrome, shoulder 11/20/2013     Priority: Medium     Rotator cuff tendinitis 11/20/2013     Priority: Medium     Obesity, Class I, BMI 30-34.9 08/23/2013     Priority: Medium     Hypotestosteronism 04/30/2012     Priority: Medium     Vitamin D deficiency 04/30/2012     Priority: Medium     Impaired fasting glucose 04/30/2012     Priority: Medium     Hyperlipidemia LDL goal <130 04/12/2012     Priority: Medium     Essential hypertension with goal blood pressure less than 140/90 04/12/2012     Priority: Medium     Erectile dysfunction 04/12/2012     Priority: Medium       Surgical Hx:  has a past surgical history that includes no history of surgery.    Medications:   Current Outpatient Prescriptions:      ibuprofen (ADVIL/MOTRIN) 800 MG tablet, Take 1 tablet (800 mg) by mouth every 8 hours as needed for moderate pain, Disp: 90 tablet, Rfl: 1     chlorthalidone (HYGROTON) 25 MG tablet, Take 1 tablet (25 mg) by mouth daily for blood pressure., Disp: 90 tablet, Rfl: 1     lisinopril (PRINIVIL/ZESTRIL) 20 MG tablet, Take 1 tablet (20 mg) by mouth daily for blood pressure., Disp: 90 tablet, Rfl: 1     simvastatin (ZOCOR) 40 MG tablet, Take 1 tablet (40 mg) by mouth every evening for cholesterol., Disp: 90 tablet, Rfl: 1     sildenafil (REVATIO) 20 MG tablet, Take 1-5 tablets ( mg) by mouth daily as needed , 1-3 hours before intimacy. Never use with nitroglycerin, terazosin or doxazosin., Disp: 30 tablet, Rfl: 5     polyvinyl alcohol (ARTIFICIAL TEARS) 1.4 % ophthalmic solution, Place 1 drop into the right eye 4 times daily, Disp: 6 mL, Rfl: 11     Artificial Tear Ointment (REFRESH P.M.) OINT, Apply 3.5 g to eye At Bedtime, Disp: 3.5 g, Rfl: 11     sulindac (CLINORIL) 200 MG tablet, Take 1 tablet (200 mg) by mouth 2 times daily (with meals), Disp:  "60 tablet, Rfl: 1     MULTIPLE VITAMINS PO, Take by mouth daily, Disp: , Rfl:     Allergies: No Known Allergies    Social Hx:   reports that he has never smoked. He has never used smokeless tobacco. He reports that he drinks alcohol. He reports that he does not use illicit drugs.    Family Hx: family history includes DIABETES in his mother; Hypertension in his mother. There is no history of C.A.D., CEREBROVASCULAR DISEASE, CANCER, Glaucoma, Macular Degeneration, or Retinal detachment.    REVIEW OF SYSTEMS: 10 point ROS neg other than the symptoms noted above in the HPI and PMH. Notables include  CONSTITUTIONAL:NEGATIVE for fever, chills, change in weight  INTEGUMENTARY/SKIN: NEGATIVE for worrisome rashes, moles or lesions  MUSCULOSKELETAL:See HPI above  NEURO: NEGATIVE for weakness, dizziness or paresthesias    This document serves as a record of the services and decisions personally performed and made by Tj Monroe MD. It was created on his behalf by Fela Owusu, a trained medical scribe. The creation of this document is based the provider's statements to the medical scribe.    Scribe Fela Owusu 4:09 PM 2/21/2018    PHYSICAL EXAM:  Resp 16  Ht 1.702 m (5' 7\")  Wt 88.5 kg (195 lb)  BMI 30.54 kg/m2   GENERAL APPEARANCE: healthy, alert, no distress  SKIN: no suspicious lesions or rashes  NEURO: Normal strength and tone, mentation intact and speech normal  PSYCH:  mentation appears normal and affect normal, not anxious  RESPIRATORY: No increased work of breathing.    Hands: no clubbing, nail pitting or nodes.    BILATERAL LOWER EXTREMITIES:  Gait: normal  Alignment: slight valgus on right, neutral on left.  No gross deformities or masses.  No Quad atrophy, strength normal.  Intact sensation deep peroneal nerve, superficial peroneal nerve, med/lat tibial nerve, sural nerve, saphenous nerve  Intact EHL, EDL, TA, FHL, GS, quadriceps hamstrings and hip flexors  Toes warm and well perfused, brisk " capillary refill. Palpable 2+ dp pulses.  Bilateral calf soft and nttp or squeeze.  No palpable popliteal lymphadenopathy.  DTRs: achilles 2+, patella 2+.  Edema: none    LEFT KNEE EXAM:    Skin: intact, no ecchymosis or erythema  Squat: 100 %, not limited by pain.     ROM: 1 hyperextension to 135 flexion  Tight hamstrings on straight leg raise.  Effusion: none  Tender: NTTP med/lat joint line, anterior or posterior knee  McMurrays: negative    Valgus stress/MCL: stable, and non-painful at both 0 and 30 degrees knee flexion  Varus stress: stable, and non-painful at both 0 and 30 degrees knee flexion  Lachmans: neg, firm endpoint  Posterior Drawer stable  Patellofemoral joint:                Extensor Lag: none              Q Angle: normal              Patellar Mobility: normal              Apprehension: negative              Crepitations: mild   Grind: negative    RIGHT KNEE EXAM:    Skin: intact, no ecchymosis or erythema  Squat: 100 %, anterior knee pain   ROM: 1 hyperextension to 130 flexion  Tight hamstrings on straight leg raise.  Effusion: small  Tender: Medial gastrocnemius, pes anserine bursa, medial joint line, lateral joint line, posterior knee  NTTP anterior knee  McMurrays: negative    Valgus stress/MCL: stable, and non-painful at both 0 and 30 degrees knee flexion  Varus stress: stable, and non-painful at both 0 and 30 degrees knee flexion  Lachmans: neg, firm endpoint  Posterior Drawer stable  Patellofemoral joint:                Extensor Lag: none              Q Angle: normal              Patellar Mobility: normal              Apprehension: negative              Crepitations: mild   Grind: positive    X-RAY:  3 views right knee from 2/21/2018 were reviewed in clinic today. On my review, no obvious fractures or dislocations. Slight medial narrowing.    MRI:  MRI right knee from 2/21/2018 was reviewed in clinic today.    IMPRESSION:   1. Ruptured or leaking Baker's cyst.  2. Mild chondromalacia of the  medial femoral condyle.  3. No meniscus tear is seen.      Impression:  52 year old male with right knee pain, ruptured Baker's cyst.     Plan:  * Reviewed imaging with patient. Shows mild arthritis, ruptured popliteal cyst behind the knee.  * Discussed he has a ruptured baker's cyst, leading to the pain in the back of the knee and calf.  * Does not appear to have any meniscus tears based on MRI. Cannot completely rule out however.    * Rest  * Activity modification - avoid activities that aggravate symptoms.  * NSAIDS - regular use for inflammation, with food, as long as no contra-indications. Please discuss with pcp if needed.  * Ice twice daily to three times daily.  * Compression wrap  * Elevation of extremity to reduce swelling  * Consider PT for strengthening, stretching and range of motion exercises  * Tylenol as needed for pain  * Prescription for motrin 800mg given today.  * Injections: consider in future should pain persist despite trial of mentioned recommendations. Patient declines today  * Return to clinic if symptoms do not improve.       The information in this document, created by a scribe for me, accurately reflects the services I personally performed and the decisions made by me. I have reviewed and approved this document for accuracy.     Tj Monroe M.D., M.S.  Dept. of Orthopaedic Surgery  [facility]

## 2018-02-21 NOTE — LETTER
"    2/21/2018         RE: Jose Alejandro Deras  [address]  [facility] [address]        Dear Colleague,    Thank you for referring your patient, Jose Alejandro Deras, to the [facility]. Please see a copy of my visit note below.    chief complaint:   Chief Complaint   Patient presents with     Knee right     Right knee pain x 3-4 week No injury. Uses an incline on treadmill at the gym. He has swelling on the knee. calf feels hard.     Jose Alejandro Deras is seen today in the [facility] for evaluation of right knee pain at the request of Maurice Melo        HISTORY OF PRESENT ILLNESS    Jose Alejandro \"CARLENE\" LIBBY Deras is a 52 year old male seen for evaluation of a right knee pain that occurred about 1 month ago with no known injury. He was using an incline on the treadmill at the gym. He started to notice swelling in the knee. Also felt the calf feel hard with pain. Was seen by his primary doctor, who ordered an MRI and DVT scan. DVT scan negative for blood clot. Since onset, he continues to have pain. Pain is mild today, rated a 3/10. He has soreness diffusely in the knee, with more pain being posterior. Some swelling over the lateral and posterior knee.    Present symptoms: mild pain, + swelling.  Denies locking, catching or giving way.  Symptoms occur with activity .    The frequency of symptoms frequently.  Pain severity: 3/10  Pain quality: dull and aching  Exacerbating Factors: with weightbearing and squatting  Relieving Factors: rest  Night Pain: No  Pain while at rest: No   Patient has tried:     NSAIDS: Yes      Physical Therapy: No      Activity modification: Yes      Bracing: No      Injections: No     Ice: Yes      Other: none      Usual level of recreational activity: very athletic  Usual level of work activity:.    Orthopedic PMH: known left shoulder rotator cuff tear    Other PMH:  has a past medical history of Erectile dysfunction; " Essential hypertension, benign; Hyperlipidemia LDL goal <130; Hypotestosteronism; IGT (impaired glucose tolerance); Left-sided Bell's palsy (09/19/2017); Obesity, Class I, BMI 30-34.9 (8/23/2013); Overweight (BMI 25.0-29.9) (8/23/2013); Right-sided Bell's palsy (05/11/2017); and Vitamin D deficiency.  Patient Active Problem List    Diagnosis Date Noted     Bell's palsy 05/11/2017     Priority: Medium     Impingement syndrome, shoulder 11/20/2013     Priority: Medium     Rotator cuff tendinitis 11/20/2013     Priority: Medium     Obesity, Class I, BMI 30-34.9 08/23/2013     Priority: Medium     Hypotestosteronism 04/30/2012     Priority: Medium     Vitamin D deficiency 04/30/2012     Priority: Medium     Impaired fasting glucose 04/30/2012     Priority: Medium     Hyperlipidemia LDL goal <130 04/12/2012     Priority: Medium     Essential hypertension with goal blood pressure less than 140/90 04/12/2012     Priority: Medium     Erectile dysfunction 04/12/2012     Priority: Medium       Surgical Hx:  has a past surgical history that includes no history of surgery.    Medications:   Current Outpatient Prescriptions:      ibuprofen (ADVIL/MOTRIN) 800 MG tablet, Take 1 tablet (800 mg) by mouth every 8 hours as needed for moderate pain, Disp: 90 tablet, Rfl: 1     chlorthalidone (HYGROTON) 25 MG tablet, Take 1 tablet (25 mg) by mouth daily for blood pressure., Disp: 90 tablet, Rfl: 1     lisinopril (PRINIVIL/ZESTRIL) 20 MG tablet, Take 1 tablet (20 mg) by mouth daily for blood pressure., Disp: 90 tablet, Rfl: 1     simvastatin (ZOCOR) 40 MG tablet, Take 1 tablet (40 mg) by mouth every evening for cholesterol., Disp: 90 tablet, Rfl: 1     sildenafil (REVATIO) 20 MG tablet, Take 1-5 tablets ( mg) by mouth daily as needed , 1-3 hours before intimacy. Never use with nitroglycerin, terazosin or doxazosin., Disp: 30 tablet, Rfl: 5     polyvinyl alcohol (ARTIFICIAL TEARS) 1.4 % ophthalmic solution, Place 1 drop into the  "right eye 4 times daily, Disp: 6 mL, Rfl: 11     Artificial Tear Ointment (REFRESH P.M.) OINT, Apply 3.5 g to eye At Bedtime, Disp: 3.5 g, Rfl: 11     sulindac (CLINORIL) 200 MG tablet, Take 1 tablet (200 mg) by mouth 2 times daily (with meals), Disp: 60 tablet, Rfl: 1     MULTIPLE VITAMINS PO, Take by mouth daily, Disp: , Rfl:     Allergies: No Known Allergies    Social Hx:   reports that he has never smoked. He has never used smokeless tobacco. He reports that he drinks alcohol. He reports that he does not use illicit drugs.    Family Hx: family history includes DIABETES in his mother; Hypertension in his mother. There is no history of C.A.D., CEREBROVASCULAR DISEASE, CANCER, Glaucoma, Macular Degeneration, or Retinal detachment.    REVIEW OF SYSTEMS: 10 point ROS neg other than the symptoms noted above in the HPI and PMH. Notables include  CONSTITUTIONAL:NEGATIVE for fever, chills, change in weight  INTEGUMENTARY/SKIN: NEGATIVE for worrisome rashes, moles or lesions  MUSCULOSKELETAL:See HPI above  NEURO: NEGATIVE for weakness, dizziness or paresthesias    This document serves as a record of the services and decisions personally performed and made by Tj Monroe MD. It was created on his behalf by Fela Owusu, a trained medical scribe. The creation of this document is based the provider's statements to the medical scribe.    Marie Owusu 4:09 PM 2/21/2018    PHYSICAL EXAM:  Resp 16  Ht 1.702 m (5' 7\")  Wt 88.5 kg (195 lb)  BMI 30.54 kg/m2   GENERAL APPEARANCE: healthy, alert, no distress  SKIN: no suspicious lesions or rashes  NEURO: Normal strength and tone, mentation intact and speech normal  PSYCH:  mentation appears normal and affect normal, not anxious  RESPIRATORY: No increased work of breathing.    Hands: no clubbing, nail pitting or nodes.    BILATERAL LOWER EXTREMITIES:  Gait: normal  Alignment: slight valgus on right, neutral on left.  No gross deformities or masses.  No Quad " atrophy, strength normal.  Intact sensation deep peroneal nerve, superficial peroneal nerve, med/lat tibial nerve, sural nerve, saphenous nerve  Intact EHL, EDL, TA, FHL, GS, quadriceps hamstrings and hip flexors  Toes warm and well perfused, brisk capillary refill. Palpable 2+ dp pulses.  Bilateral calf soft and nttp or squeeze.  No palpable popliteal lymphadenopathy.  DTRs: achilles 2+, patella 2+.  Edema: none    LEFT KNEE EXAM:    Skin: intact, no ecchymosis or erythema  Squat: 100 %, not limited by pain.     ROM: 1 hyperextension to 135 flexion  Tight hamstrings on straight leg raise.  Effusion: none  Tender: NTTP med/lat joint line, anterior or posterior knee  McMurrays: negative    Valgus stress/MCL: stable, and non-painful at both 0 and 30 degrees knee flexion  Varus stress: stable, and non-painful at both 0 and 30 degrees knee flexion  Lachmans: neg, firm endpoint  Posterior Drawer stable  Patellofemoral joint:                Extensor Lag: none              Q Angle: normal              Patellar Mobility: normal              Apprehension: negative              Crepitations: mild   Grind: negative    RIGHT KNEE EXAM:    Skin: intact, no ecchymosis or erythema  Squat: 100 %, anterior knee pain   ROM: 1 hyperextension to 130 flexion  Tight hamstrings on straight leg raise.  Effusion: small  Tender: Medial gastrocnemius, pes anserine bursa, medial joint line, lateral joint line, posterior knee  NTTP anterior knee  McMurrays: negative    Valgus stress/MCL: stable, and non-painful at both 0 and 30 degrees knee flexion  Varus stress: stable, and non-painful at both 0 and 30 degrees knee flexion  Lachmans: neg, firm endpoint  Posterior Drawer stable  Patellofemoral joint:                Extensor Lag: none              Q Angle: normal              Patellar Mobility: normal              Apprehension: negative              Crepitations: mild   Grind: positive    X-RAY:  3 views right knee from 2/21/2018 were reviewed  in clinic today. On my review, no obvious fractures or dislocations. Slight medial narrowing.    MRI:  MRI right knee from 2/21/2018 was reviewed in clinic today.    IMPRESSION:   1. Ruptured or leaking Baker's cyst.  2. 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  3. No meniscus tear is seen.      Impression:  52 year old male with right knee pain, ruptured Baker's cyst.     Plan:  * Reviewed imaging with patient. Shows mild arthritis, ruptured popliteal cyst behind the knee.  * Discussed he has a ruptured baker's cyst, leading to the pain in the back of the knee and calf.  * Does not appear to have any meniscus tears based on MRI. Cannot completely rule out however.    * Rest  * Activity modification - avoid activities that aggravate symptoms.  * NSAIDS - regular use for inflammation, with food, as long as no contra-indications. Please discuss with pcp if needed.  * Ice twice daily to three times daily.  * Compression wrap  * Elevation of extremity to reduce swelling  * Consider PT for strengthening, stretching and range of motion exercises  * Tylenol as needed for pain  * Prescription for motrin 800mg given today.  * Injections: consider in future should pain persist despite trial of mentioned recommendations. Patient declines today  * Return to clinic if symptoms do not improve.       The information in this document, created by a scribe for me, accurately reflects the services I personally performed and the decisions made by me. I have reviewed and approved this document for accuracy.     Tj Monroe M.D., M.S.  Dept. of Orthopaedic Surgery  [facility]      Again, thank you for allowing me to participate in the care of your patient.        Sincerely,        Tj Monroe MD

## 2018-02-21 NOTE — PATIENT INSTRUCTIONS
Please remember to call and schedule a follow up appointment if one was recommended at your earliest convenience.   Orthopedics CLINIC HOURS TELEPHONE NUMBER   Efe Landeros M.D.      Ella Paulson  Medical Assistant Tuesday 8 am -5 pm    Wednesday 8 am -1 pm    Thursday 8 am -5 pm   Specialty schedulers:   [phone] to make an appointment with any Specialty Provider.   Main Clinic:   [phone] to make an appointment with your primary provider   Urgent Care locations:    [facility] Monday-Friday 5 pm - 9 pm  Saturday-Sunday 9 am -5pm      Monday-Friday 11 am - 9 pm  Saturday 9 am - 5 pm [phone]     If SURGERY has been recommended, please call our Specialty Schedulers at [phone] to schedule your procedure.    If you need a medication refill, please contact your pharmacy. Please allow 3 business days for your refill to be completed.  Use [employer] (secure e-mail communication and access to your chart) to send a message or to make an appointment. Please ask how you can sign up for [employer].

## 2018-08-14 DIAGNOSIS — N52.9 ERECTILE DYSFUNCTION, UNSPECIFIED ERECTILE DYSFUNCTION TYPE: ICD-10-CM

## 2018-08-14 RX ORDER — SILDENAFIL CITRATE 20 MG/1
TABLET ORAL
Qty: 30 TABLET | Refills: 2 | Status: SHIPPED | OUTPATIENT
Start: 2018-08-14 | End: 2019-02-26

## 2018-08-14 NOTE — TELEPHONE ENCOUNTER
"Requested Prescriptions   Pending Prescriptions Disp Refills     sildenafil (REVATIO) 20 MG tablet [Pharmacy Med Name: Sildenafil Citrate Oral Tablet 20 MG]  Last Written Prescription Date:  01/09/18  Last Fill Quantity: 30,  # refills: 5   Last Office Visit with [facility], [facility] or [facility] prescribing provider:  02/12/18   Future Office Visit:    30 tablet 2     Sig: TAKE 1 TO 5 TABLETS BY MOUTH DAILY AS NEEDED 1 TO 3 HOURS BEFORE INTIMACY. NEVER TAKE WITH NITROGLYCERIN, DOXAZOSIN, OR TERAZOSIN.    Erectile Dysfuction Protocol Passed    8/14/2018  7:01 AM       Passed - Absence of nitrates on medication list       Passed - Absence of Alpha Blockers on Med list       Passed - Recent (12 mo) or future (30 days) visit within the authorizing provider's specialty    Patient had office visit in the last 12 months or has a visit in the next 30 days with authorizing provider or within the authorizing provider's specialty.  See \"Patient Info\" tab in inbasket, or \"Choose Columns\" in Meds & Orders section of the refill encounter.           Passed - Patient is age 18 or older          "

## 2019-02-06 DIAGNOSIS — I10 ESSENTIAL HYPERTENSION WITH GOAL BLOOD PRESSURE LESS THAN 140/90: ICD-10-CM

## 2019-02-06 DIAGNOSIS — E78.5 HYPERLIPIDEMIA LDL GOAL <130: ICD-10-CM

## 2019-02-06 NOTE — TELEPHONE ENCOUNTER
"Requested Prescriptions   Pending Prescriptions Disp Refills     lisinopril (PRINIVIL/ZESTRIL) 20 MG tablet [Pharmacy Med Name: Lisinopril Oral Tablet 20 MG] 30 tablet 0      Last Written Prescription Date:  01/09/18  Last Fill Quantity: 90,  # refills: 1   [facility] Office Visit with [facility] or [facility] prescribing provider:  02/12/18[facility] Office Visit:    Sig: TAKE ONE TABLET BY MOUTH ONE TIME DAILY    ACE Inhibitors (Including Combos) Protocol Failed - 2/6/2019 12:51 PM       Failed - Normal serum creatinine on file in past 12 months    Recent Labs   Lab Test 01/20/18  1111   CR 0.91            Failed - Normal serum potassium on file in past 12 months    Recent Labs   Lab Test 01/20/18  1111   POTASSIUM 4.1            Passed - Blood pressure under 140/90 in past 12 months    BP Readings from Last 3 Encounters:   02/12/18 139/78   01/09/18 122/69   10/03/17 121/73                Passed - Recent (12 mo) or future (30 days) visit within the authorizing provider's specialty    Patient had office visit in the last 12 months or has a visit in the next 30 days with authorizing provider or within the authorizing provider's specialty.  See \"Patient Info\" tab in inbasket, or \"Choose Columns\" in Meds & Orders section of the refill encounter.             Passed - Medication is active on med list       Passed - Patient is age 18 or older        simvastatin (ZOCOR) 40 MG tablet [Pharmacy Med Name: Simvastatin Oral Tablet 40 MG] 30 tablet 0      Last Written Prescription Date:  01/09/18  Last Fill Quantity: 90,  # refills: 1   Last Office Visit with [facility] or [facility] prescribing provider:  02/12/18[facility] Office Visit:    Sig: TAKE ONE TABLET BY MOUTH IN THE EVENING    Statins Protocol Failed - 2/6/2019 12:51 PM       Failed - LDL on file in past 12 months    Recent Labs   Lab Test 01/20/18  1111   *            Passed - No abnormal creatine kinase in past 12 months    No lab results found.            " "Passed - Recent (12 mo) or future (30 days) visit within the authorizing provider's specialty    Patient had office visit in the last 12 months or has a visit in the next 30 days with authorizing provider or within the authorizing provider's specialty.  See \"Patient Info\" tab in inbasket, or \"Choose Columns\" in Meds & Orders section of the refill encounter.             Passed - Medication is active on med list       Passed - Patient is age 18 or older        chlorthalidone (HYGROTON) 25 MG tablet [Pharmacy Med Name: Chlorthalidone Oral Tablet 25 MG] 30 tablet 0      Last Written Prescription Date:  01/09/18  Last Fill Quantity: 90,  # refills: 1   Last Office Visit with [facility] or [facility] prescribing provider:  02/12/18[facility]   Future Office Visit:    Sig: TAKE ONE TABLET BY MOUTH ONE TIME DAILY FOR BLOOD PRESSURE    Diuretics (Including Combos) Protocol Failed - 2/6/2019 12:51 PM       Failed - Normal serum creatinine on file in past 12 months    Recent Labs   Lab Test 01/20/18  1111   CR 0.91             Failed - Normal serum potassium on file in past 12 months    Recent Labs   Lab Test 01/20/18  1111   POTASSIUM 4.1                   Failed - Normal serum sodium on file in past 12 months    Recent Labs   Lab Test 01/20/18  1111                Passed - Blood pressure under 140/90 in past 12 months    BP Readings from Last 3 Encounters:   02/12/18 139/78   01/09/18 122/69   10/03/17 121/73                Passed - Recent (12 mo) or future (30 days) visit within the authorizing provider's specialty    Patient had office visit in the last 12 months or has a visit in the next 30 days with authorizing provider or within the authorizing provider's specialty.  See \"Patient Info\" tab in inbasket, or \"Choose Columns\" in Meds & Orders section of the refill encounter.             Passed - Medication is active on med list       Passed - Patient is age 18 or older          "

## 2019-02-07 RX ORDER — LISINOPRIL 20 MG/1
TABLET ORAL
Qty: 30 TABLET | Refills: 0 | Status: SHIPPED | OUTPATIENT
Start: 2019-02-07 | End: 2019-02-26

## 2019-02-07 RX ORDER — CHLORTHALIDONE 25 MG/1
TABLET ORAL
Qty: 30 TABLET | Refills: 0 | Status: SHIPPED | OUTPATIENT
Start: 2019-02-07 | End: 2019-02-26

## 2019-02-07 RX ORDER — SIMVASTATIN 40 MG
TABLET ORAL
Qty: 30 TABLET | Refills: 0 | Status: SHIPPED | OUTPATIENT
Start: 2019-02-07 | End: 2019-02-26

## 2019-02-07 NOTE — TELEPHONE ENCOUNTER
Routing refill request to provider for review/approval because:  Labs not current:  LDL, creatinine, potassium, sodium      Shannan Key RN, BSN

## 2019-02-07 NOTE — TELEPHONE ENCOUNTER
Reason for Call:  Other prescription    Detailed comments: Needs medication asap please call when approved.    Phone Number Patient can be reached at: Cell number on file:    Telephone Information:   Mobile [phone]     Best Time: any    Can we leave a detailed message on this number? YES    Call taken on 2/7/2019 at 9:06 AM by Ngoc Renee

## 2019-02-07 NOTE — TELEPHONE ENCOUNTER
Reason for Call:  Other prescription    Detailed comments: Carloz is asking for his refill as soon as possible. He states he is completely out and [place] at 4[place] Friday 02[place]08. Please call as soon as this gets sent to his pharmacy     Phone Number Patient can be reached at: Home number on file [phone] (home)    Best Time: Any    Can we leave a detailed message on this number? YES    Call taken on 2/6/2019 at 6:21 PM by Rocio Haider

## 2019-02-07 NOTE — TELEPHONE ENCOUNTER
Patient here in lobby and verbally informed. He states have been taking his medication every day. Appt Dr Barroso 2/26/19.  Alexi Wilder CMA

## 2019-02-07 NOTE — TELEPHONE ENCOUNTER
"Requested Prescriptions   Pending Prescriptions Disp Refills     lisinopril (PRINIVIL/ZESTRIL) 20 MG tablet [Pharmacy Med Name: Lisinopril Oral Tablet 20 MG] 30 tablet 0     Sig: TAKE ONE TABLET BY MOUTH ONE TIME DAILY    ACE Inhibitors (Including Combos) Protocol Failed - 2/6/2019  6:24 PM       Failed - Normal serum creatinine on file in past 12 months    Recent Labs   Lab Test 01/20/18  1111   CR 0.91            Failed - Normal serum potassium on file in past 12 months    Recent Labs   Lab Test 01/20/18  1111   POTASSIUM 4.1            Passed - Blood pressure under 140/90 in past 12 months    BP Readings from Last 3 Encounters:   02/12/18 139/78   01/09/18 122/69   10/03/17 121/73                Passed - Recent (12 mo) or future (30 days) visit within the authorizing provider's specialty    Patient had office visit in the last 12 months or has a visit in the next 30 days with authorizing provider or within the authorizing provider's specialty.  See \"Patient Info\" tab in inbasket, or \"Choose Columns\" in Meds & Orders section of the refill encounter.             Passed - Medication is active on med list       Passed - Patient is age 18 or older        simvastatin (ZOCOR) 40 MG tablet [Pharmacy Med Name: Simvastatin Oral Tablet 40 MG] 30 tablet 0     Sig: TAKE ONE TABLET BY MOUTH IN THE EVENING    Statins Protocol Failed - 2/6/2019  6:24 PM       Failed - LDL on file in past 12 months    Recent Labs   Lab Test 01/20/18  1111   *            Passed - No abnormal creatine kinase in past 12 months    No lab results found.            Passed - Recent (12 mo) or future (30 days) visit within the authorizing provider's specialty    Patient had office visit in the last 12 months or has a visit in the next 30 days with authorizing provider or within the authorizing provider's specialty.  See \"Patient Info\" tab in inbasket, or \"Choose Columns\" in Meds & Orders section of the refill encounter.             Passed - " "Medication is active on med list       Passed - Patient is age 18 or older        chlorthalidone (HYGROTON) 25 MG tablet [Pharmacy Med Name: Chlorthalidone Oral Tablet 25 MG] 30 tablet 0     Sig: TAKE ONE TABLET BY MOUTH ONE TIME DAILY FOR BLOOD PRESSURE    Diuretics (Including Combos) Protocol Failed - 2/6/2019  6:24 PM       Failed - Normal serum creatinine on file in past 12 months    Recent Labs   Lab Test 01/20/18  1111   CR 0.91             Failed - Normal serum potassium on file in past 12 months    Recent Labs   Lab Test 01/20/18  1111   POTASSIUM 4.1                   Failed - Normal serum sodium on file in past 12 months    Recent Labs   Lab Test 01/20/18  1111                Passed - Blood pressure under 140/90 in past 12 months    BP Readings from Last 3 Encounters:   02/12/18 139/78   01/09/18 122/69   10/03/17 121/73                Passed - Recent (12 mo) or future (30 days) visit within the authorizing provider's specialty    Patient had office visit in the last 12 months or has a visit in the next 30 days with authorizing provider or within the authorizing provider's specialty.  See \"Patient Info\" tab in inbasket, or \"Choose Columns\" in Meds & Orders section of the refill encounter.             Passed - Medication is active on med list       Passed - Patient is age 18 or older          "

## 2019-02-07 NOTE — TELEPHONE ENCOUNTER
Trisha refills sent. Needs f/u for HTN (possibly ZULEIKA) when he returns from [facility] (presumably less than 1 mo).

## 2019-02-07 NOTE — TELEPHONE ENCOUNTER
Reason for Call:  Other prescription    Detailed comments: Pharmacy calling to notify Dr. Barroso that Pt is leaving out of the country and would like a month's supply Rx's sent to Pharmacy as soon as possible.    Phone Number Pharmacy can be reached at: Other phone number:  [phone]    Best Time: anytime    Can we leave a detailed message on this number? YES    Call taken on 2/7/2019 at 12:52 PM by Basim Laughlin

## 2019-02-26 ENCOUNTER — OFFICE VISIT (OUTPATIENT)
Dept: FAMILY MEDICINE | Facility: CLINIC | Age: 54
End: 2019-02-26

## 2019-02-26 VITALS
BODY MASS INDEX: 30.13 KG/M2 | HEIGHT: 67 IN | DIASTOLIC BLOOD PRESSURE: 77 MMHG | HEART RATE: 80 BPM | TEMPERATURE: 99.2 F | SYSTOLIC BLOOD PRESSURE: 129 MMHG | OXYGEN SATURATION: 96 % | WEIGHT: 192 LBS

## 2019-02-26 DIAGNOSIS — N52.9 ERECTILE DYSFUNCTION, UNSPECIFIED ERECTILE DYSFUNCTION TYPE: ICD-10-CM

## 2019-02-26 DIAGNOSIS — E78.5 HYPERLIPIDEMIA LDL GOAL <130: ICD-10-CM

## 2019-02-26 DIAGNOSIS — Z12.11 SCREEN FOR COLON CANCER: ICD-10-CM

## 2019-02-26 DIAGNOSIS — Z00.00 ENCOUNTER FOR ROUTINE ADULT HEALTH EXAMINATION WITHOUT ABNORMAL FINDINGS: Primary | ICD-10-CM

## 2019-02-26 DIAGNOSIS — I10 ESSENTIAL HYPERTENSION WITH GOAL BLOOD PRESSURE LESS THAN 140/90: ICD-10-CM

## 2019-02-26 DIAGNOSIS — Z11.4 SCREENING FOR HIV (HUMAN IMMUNODEFICIENCY VIRUS): ICD-10-CM

## 2019-02-26 DIAGNOSIS — Z28.21 VACCINATION NOT CARRIED OUT BECAUSE OF PATIENT REFUSAL: ICD-10-CM

## 2019-02-26 PROCEDURE — 84460 ALANINE AMINO (ALT) (SGPT): CPT | Performed by: FAMILY MEDICINE

## 2019-02-26 PROCEDURE — 99396 PREV VISIT EST AGE 40-64: CPT | Performed by: FAMILY MEDICINE

## 2019-02-26 PROCEDURE — 36415 COLL VENOUS BLD VENIPUNCTURE: CPT | Performed by: FAMILY MEDICINE

## 2019-02-26 PROCEDURE — 80061 LIPID PANEL: CPT | Performed by: FAMILY MEDICINE

## 2019-02-26 PROCEDURE — 87389 HIV-1 AG W/HIV-1&-2 AB AG IA: CPT | Performed by: FAMILY MEDICINE

## 2019-02-26 PROCEDURE — 80048 BASIC METABOLIC PNL TOTAL CA: CPT | Performed by: FAMILY MEDICINE

## 2019-02-26 RX ORDER — CHLORTHALIDONE 25 MG/1
25 TABLET ORAL DAILY
Qty: 90 TABLET | Refills: 1 | Status: SHIPPED | OUTPATIENT
Start: 2019-02-26 | End: 2019-11-24

## 2019-02-26 RX ORDER — LISINOPRIL 20 MG/1
20 TABLET ORAL DAILY
Qty: 90 TABLET | Refills: 1 | Status: SHIPPED | OUTPATIENT
Start: 2019-02-26 | End: 2019-11-24

## 2019-02-26 RX ORDER — SILDENAFIL CITRATE 20 MG/1
TABLET ORAL
Qty: 30 TABLET | Refills: 11 | Status: SHIPPED | OUTPATIENT
Start: 2019-02-26 | End: 2020-02-21

## 2019-02-26 RX ORDER — SIMVASTATIN 40 MG
40 TABLET ORAL EVERY EVENING
Qty: 90 TABLET | Refills: 1 | Status: SHIPPED | OUTPATIENT
Start: 2019-02-26 | End: 2019-11-24

## 2019-02-26 ASSESSMENT — PAIN SCALES - GENERAL: PAINLEVEL: NO PAIN (0)

## 2019-02-26 ASSESSMENT — MIFFLIN-ST. JEOR: SCORE: 1669.54

## 2019-02-26 NOTE — PATIENT INSTRUCTIONS
================================================================================  Normal Values   Blood pressure  <140/90 for most adults    <130/80 for some chronic diseases (ask your care team about yours)    BMI (body mass index)  18.5-25 kg/m2 (based on height and weight)     Thank you for visiting [facility]    Normal or non-critical lab and imaging results will be communicated to you by MyChart, letter or phone within 7 days.  If you do not hear from us within 10 days, please call the clinic. If you have a critical or abnormal lab result, we will notify you by phone as soon as possible.     If you have any questions regarding your visit please contact:     Team Comfort:   Clinic Hours Telephone Number   Dr. Maurice Morrison Dr. [place] 7am-5pm  Monday - Friday [phone]  Peyton RN  Liane Montesinos RN   Pharmacy 8:00am-8pm Monday-Friday    9am-5pm Saturday-Sunday [phone]   Urgent Care 11am-9pm Monday-Friday        9am-5pm Saturday-Sunday [phone]     After hours, weekend or if you need to make an appointment with your primary provider please call [phone].   After Hours nurse advise: call [facility]: [phone]    Medication Refills:  Call your pharmacy and they will forward the refill to us. Please allow 3 business days for your refills to be completed.          Preventive Health Recommendations  Male Ages 50 - 64    Yearly exam:             See your health care provider every year in order to  o   Review health changes.   o   Discuss preventive care.    o   Review your medicines if your doctor has prescribed any.     Have a cholesterol test every 5 years, or more frequently if you are at risk for high cholesterol/heart disease.     Have a diabetes test (fasting glucose) every three years. If you are at risk for diabetes, you should have this test more often.     Have a colonoscopy at age 50, or have a yearly FIT test  (stool test). These exams will check for colon cancer.      Talk with your health care provider about whether or not a prostate cancer screening test (PSA) is right for you.    You should be tested each year for STDs (sexually transmitted diseases), if you re at risk.     Shots: Get a flu shot each year. Get a tetanus shot every 10 years.     Nutrition:    Eat at least 5 servings of fruits and vegetables daily.     Eat whole-grain bread, whole-wheat pasta and brown rice instead of white grains and rice.     Get adequate Calcium and Vitamin D.     Lifestyle    Exercise for at least 150 minutes a week (30 minutes a day, 5 days a week). This will help you control your weight and prevent disease.     Limit alcohol to one drink per day.     No smoking.     Wear sunscreen to prevent skin cancer.     See your dentist every six months for an exam and cleaning.     See your eye doctor every 1 to 2 years.

## 2019-02-26 NOTE — LETTER
March 4, 2019      Jose Alejandro Deras  [address]  [place] [address]        Dear Jose Alejandro Deras    All of your labs were normal.      Enclosed is a copy of the results.  It was a pleasure to see you at your last appointment.    If you have any questions or concerns, please call myself or my nurse at [phone].      Sincerely,      Maurice Barroso MD/RASHMI Magallon MA      Results for orders placed or performed in visit on 02/26/19   Lipid panel reflex to direct LDL Non-fasting   Result Value Ref Range    Cholesterol 152 <200 mg/dL    Triglycerides 102 <150 mg/dL    HDL Cholesterol 44 >39 mg/dL    LDL Cholesterol Calculated 88 <100 mg/dL    Non HDL Cholesterol 108 <130 mg/dL   ALT   Result Value Ref Range    ALT 24 0 - 70 U/L   Basic metabolic panel   Result Value Ref Range    Sodium 142 133 - 144 mmol/L    Potassium 3.8 3.4 - 5.3 mmol/L    Chloride 107 94 - 109 mmol/L    Carbon Dioxide 29 20 - 32 mmol/L    Anion Gap 6 3 - 14 mmol/L    Glucose 104 (H) 70 - 99 mg/dL    Urea Nitrogen 16 7 - 30 mg/dL    Creatinine 0.98 0.66 - 1.25 mg/dL    GFR Estimate 86 >60 mL/min/[1.73_m2]    GFR Estimate If Black >90 >60 mL/min/[1.73_m2]    Calcium 8.9 8.5 - 10.1 mg/dL   HIV Antigen Antibody Combo   Result Value Ref Range    HIV Antigen Antibody Combo Nonreactive NR^Nonreactive

## 2019-02-26 NOTE — PROGRESS NOTES
SUBJECTIVE:   CC: Jose Alejandro Deras is an 53 year old male who presents for preventive health visit.     Healthy Habits:    Do you get at least three servings of calcium containing foods daily (dairy, green leafy vegetables, etc.)? yes    Amount of exercise or daily activities, outside of work: 2-3 day(s) per week    Problems taking medications regularly No    Medication side effects: No    Have you had an eye exam in the past two years? yes    Do you see a dentist twice per year? no    Do you have sleep apnea, excessive snoring or daytime drowsiness?no    Today's PHQ-2 Score:   PHQ-2 ( 1999 [employer]) 5/11/2017 4/20/2016   Q1: Little interest or pleasure in doing things 0 0   Q2: Feeling down, depressed or hopeless 0 0   PHQ-2 Score 0 0       Abuse: Current or Past(Physical, Sexual or Emotional)- NO  Do you feel safe in your environment? YES    Social History     Tobacco Use     Smoking status: Never Smoker     Smokeless tobacco: Never Used   Substance Use Topics     Alcohol use: Yes     Alcohol/week: 0.0 oz     Comment: occasional      If you drink alcohol do you typically have >3 drinks per day or >7 drinks per week? No                      Last PSA: No results found for: PSA    Past medical, family, and social histories, medications, and allergies are reviewed and updated in Epic.    CONSTITUTIONAL: NEGATIVE for fever, chills, change in weight  INTEGUMENTARY/SKIN: NEGATIVE for worrisome rashes, moles or lesions  EYES: NEGATIVE for vision changes or irritation  ENT: NEGATIVE for ear, mouth and throat problems  RESP: NEGATIVE for significant cough or SOB  CV: NEGATIVE for chest pain, palpitations or peripheral edema  GI: NEGATIVE for nausea, abdominal pain, heartburn, or change in bowel habits   male: negative for dysuria, hematuria, decreased urinary stream, erectile dysfunction, urethral discharge  MUSCULOSKELETAL: NEGATIVE for significant arthralgias or myalgia  NEURO: NEGATIVE for weakness, dizziness or  "paresthesias  PSYCHIATRIC: NEGATIVE for changes in mood or affect    This document serves as a record of the services and decisions personally performed by BERTA BARRIOS It was created on his/her behalf by Sabrina Kapadia, a trained medical scribe. The creation of this document is based on the provider's statements to the medical scribe. Sabrina Kapadia, February 26, 2019 4:53 PM    OBJECTIVE:   /77   Pulse 80   Temp 99.2  F (37.3  C) (Oral)   Ht 1.702 m (5' 7\")   Wt 87.1 kg (192 lb)   SpO2 96%   BMI 30.07 kg/m    EXAM:  GENERAL: healthy, alert and no distress  EYES: Eyes grossly normal to inspection, PERRL and conjunctivae and sclerae normal  HENT: ear canals and TM's normal, nose and mouth without ulcers or lesions  NECK: no adenopathy, no asymmetry, masses, or scars and thyroid normal to palpation  RESP: lungs clear to auscultation - no rales, rhonchi or wheezes  CV: regular rate and rhythm, normal S1 S2, no S3 or S4, no murmur, click or rub, no peripheral edema and peripheral pulses strong  ABDOMEN: soft, nontender, no hepatosplenomegaly, no masses and bowel sounds normal   (male): normal male genitalia without lesions or urethral discharge, no hernia  MS: no gross musculoskeletal defects noted, no edema  SKIN: no suspicious lesions or rashes  NEURO: Normal strength and tone, mentation intact and speech normal  PSYCH: mentation appears normal, affect normal/bright    ASSESSMENT/PLAN:   (Z00.00) Encounter for routine adult health examination without abnormal findings  (primary encounter diagnosis)  Comment: Negative screening exam; up-to-date on preventive services.  Plan: Fecal colorectal cancer screen FIT, HIV Antigen        Antibody Combo        Follow up in 1 year.    (E78.5) Hyperlipidemia LDL goal <130  Comment: Non-fasting.   Plan: simvastatin (ZOCOR) 40 MG tablet, Lipid panel         reflex to direct LDL Non-fasting, ALT        Return in about 6 months (around 8/16/2019) for cholesterol, blood " "pressure check.    (I10) Essential hypertension with goal blood pressure less than 140/90  Comment: Well controlled.  Plan: chlorthalidone (HYGROTON) 25 MG tablet,         lisinopril (PRINIVIL/ZESTRIL) 20 MG tablet,         Basic metabolic panel        Return in about 6 months (around 2019) for cholesterol, blood pressure check.    (N52.9) Erectile dysfunction, unspecified erectile dysfunction type  Comment: Refill request  Plan: sildenafil (REVATIO) 20 MG tablet            (Z11.4) Screening for HIV (human immunodeficiency virus)  Comment: indications for screening discussed with the patient  Plan: HIV Antigen Antibody Combo            (Z12.11) Screen for colon cancer  Comment:   Plan: Fecal colorectal cancer screen FIT            (Z28.21) Vaccination not carried out because of patient refusal  Comment: Influenza and Shingrix vaccine offered but declined by the patient.  Plan: VIS for Shingrix given and discussed with patient.     COUNSELING:  Reviewed preventive health counseling, as reflected in patient instructions       Regular exercise       Immunizations    Declined: Influenza and Zoster due to Concerns about side effects/safety           HIV screeninx in teen years, 1x in adult years, and at intervals if high risk    BP Readings from Last 1 Encounters:   19 129/77     Estimated body mass index is 30.07 kg/m  as calculated from the following:    Height as of this encounter: 1.702 m (5' 7\").    Weight as of this encounter: 87.1 kg (192 lb).    Weight management plan: exercise routine reviewed and approved.      reports that  has never smoked. he has never used smokeless tobacco.    Counseling Resources:  ATP IV Guidelines  Pooled Cohorts Equation Calculator  FRAX Risk Assessment  ICSI Preventive Guidelines  Dietary Guidelines for Americans, 2010  USDA's MyPlate  ASA Prophylaxis  Lung CA Screening    The information in this document, created by the medical scribe Sabrina Kapadia for me, accurately " reflects the services I personally performed and the decisions made by me. I have reviewed and approved this document for accuracy prior to leaving the patient care area.    Maurice Barroso MD  [facility]

## 2019-02-27 DIAGNOSIS — Z00.00 ENCOUNTER FOR ROUTINE ADULT HEALTH EXAMINATION WITHOUT ABNORMAL FINDINGS: ICD-10-CM

## 2019-02-27 DIAGNOSIS — Z12.11 SCREEN FOR COLON CANCER: ICD-10-CM

## 2019-02-27 LAB
ALT SERPL W P-5'-P-CCNC: 24 U/L (ref 0–70)
ANION GAP SERPL CALCULATED.3IONS-SCNC: 6 MMOL/L (ref 3–14)
BUN SERPL-MCNC: 16 MG/DL (ref 7–30)
CALCIUM SERPL-MCNC: 8.9 MG/DL (ref 8.5–10.1)
CHLORIDE SERPL-SCNC: 107 MMOL/L (ref 94–109)
CHOLEST SERPL-MCNC: 152 MG/DL
CO2 SERPL-SCNC: 29 MMOL/L (ref 20–32)
CREAT SERPL-MCNC: 0.98 MG/DL (ref 0.66–1.25)
GFR SERPL CREATININE-BSD FRML MDRD: 86 ML/MIN/{1.73_M2}
GLUCOSE SERPL-MCNC: 104 MG/DL (ref 70–99)
HDLC SERPL-MCNC: 44 MG/DL
HEMOCCULT STL QL IA: NEGATIVE
HIV 1+2 AB+HIV1 P24 AG SERPL QL IA: NONREACTIVE
LDLC SERPL CALC-MCNC: 88 MG/DL
NONHDLC SERPL-MCNC: 108 MG/DL
POTASSIUM SERPL-SCNC: 3.8 MMOL/L (ref 3.4–5.3)
SODIUM SERPL-SCNC: 142 MMOL/L (ref 133–144)
TRIGL SERPL-MCNC: 102 MG/DL

## 2019-02-27 PROCEDURE — 82274 ASSAY TEST FOR BLOOD FECAL: CPT | Performed by: FAMILY MEDICINE

## 2019-03-04 ENCOUNTER — TELEPHONE (OUTPATIENT)
Dept: FAMILY MEDICINE | Facility: CLINIC | Age: 54
End: 2019-03-04

## 2019-03-04 NOTE — TELEPHONE ENCOUNTER
Reason for Call:  Other     Detailed comments: Patient called wanting his results from labs, please call with these results.    Phone Number Patient can be reached at: Home number on file [phone] (home)    Best Time: anytime    Can we leave a detailed message on this number? YES    Call taken on 3/4/2019 at 9:24 AM by Leonard Sutton

## 2019-06-03 ENCOUNTER — OFFICE VISIT (OUTPATIENT)
Dept: OPTOMETRY | Facility: CLINIC | Age: 54
End: 2019-06-03
Payer: COMMERCIAL

## 2019-06-03 DIAGNOSIS — Z86.69 HISTORY OF BELL'S PALSY: ICD-10-CM

## 2019-06-03 DIAGNOSIS — G51.8: Primary | ICD-10-CM

## 2019-06-03 PROCEDURE — 92012 INTRM OPH EXAM EST PATIENT: CPT | Performed by: OPTOMETRIST

## 2019-06-03 ASSESSMENT — VISUAL ACUITY
METHOD: SNELLEN - LINEAR
OD_SC: 20/20
OS_SC+: -1
OS_SC: 20/25
OD_SC+: -2

## 2019-06-03 ASSESSMENT — EXTERNAL EXAM - RIGHT EYE: OD_EXAM: NORMAL

## 2019-06-03 ASSESSMENT — SLIT LAMP EXAM - LIDS
COMMENTS: NORMAL
COMMENTS: LOWER LID LAXITY

## 2019-06-03 NOTE — PROGRESS NOTES
Chief Complaint   Patient presents with     Tearing Evaluation      In left eye. Characterized as intermittent. Occurring intermittently. Duration of 1 month. Treatments tried include artificial tears. Response to treatment was no improvement. Pain was noted as 0/10.   Comments      Os eye londono when eats, patient has to wipe eye.     Also left cheek seems a little more swollen.    History of [place] palsy 5/2017.      Medical, surgical and family histories reviewed and updated 6/3/2019.       OBJECTIVE: See Ophthalmology exam    ASSESSMENT:    ICD-10-CM    1. Paroxysmal lacrimation G51.8 OPHTHALMOLOGY ADULT REFERRAL   2. History of Bell's palsy Z86.69       PLAN:     Patient Instructions   Referral to Dr. Asya Eden at the [facility] for evaluation- [phone].    Return for annual exam.    Cirilo Eagle OD

## 2019-06-03 NOTE — LETTER
6/3/2019         RE: Jose Alejandro Deras  [address]  [facility] [address]        Dear Colleague,    Thank you for referring your patient, Jose Alejandro Deras, to the [facility]. Please see a copy of my visit note below.    Chief Complaint   Patient presents with     Tearing Evaluation      In left eye. Characterized as intermittent. Occurring intermittently. Duration of 1 month. Treatments tried include artificial tears. Response to treatment was no improvement. Pain was noted as 0/10.   Comments      Os eye londono when eats, patient has to wipe eye.     Also left cheek seems a little more swollen.    History of [place] palsy 5/2017.      Medical, surgical and family histories reviewed and updated 6/3/2019.       OBJECTIVE: See Ophthalmology exam    ASSESSMENT:    ICD-10-CM    1. Paroxysmal lacrimation G51.8 OPHTHALMOLOGY ADULT REFERRAL   2. History of Bell's palsy Z86.69       PLAN:     Patient Instructions   Referral to Dr. Asya Eden at the [facility] for evaluation- [phone].    Return for annual exam.    Cirilo Eagle OD             Again, thank you for allowing me to participate in the care of your patient.        Sincerely,        Cirilo Ealge, OD

## 2019-06-03 NOTE — PATIENT INSTRUCTIONS
Referral to Dr. Asya Eden at the [facility] for evaluation- [phone].    Return for annual exam.    Cirilo Eagle OD

## 2019-06-26 ENCOUNTER — OFFICE VISIT (OUTPATIENT)
Dept: OPHTHALMOLOGY | Facility: CLINIC | Age: 54
End: 2019-06-26
Payer: COMMERCIAL

## 2019-06-26 DIAGNOSIS — R25.8 SYNKINESIS OF LEFT UPPER EYELID: ICD-10-CM

## 2019-06-26 DIAGNOSIS — H04.212 EPIPHORA DUE TO EXCESS LACRIMATION OF LEFT SIDE: ICD-10-CM

## 2019-06-26 DIAGNOSIS — G51.8 CROCODILE TEARS SYNDROME: Primary | ICD-10-CM

## 2019-06-26 DIAGNOSIS — G51.9 FACIAL NERVE DISORDER: ICD-10-CM

## 2019-06-26 DIAGNOSIS — G51.8 CROCODILE TEARS SYNDROME: ICD-10-CM

## 2019-06-26 PROCEDURE — 68810 PROBE NASOLACRIMAL DUCT: CPT | Mod: LT | Performed by: OPHTHALMOLOGY

## 2019-06-26 PROCEDURE — 99243 OFF/OP CNSLTJ NEW/EST LOW 30: CPT | Mod: 25 | Performed by: OPHTHALMOLOGY

## 2019-06-26 ASSESSMENT — VISUAL ACUITY
OD_SC: 20/25
METHOD: SNELLEN - LINEAR
OD_SC+: +2
OS_SC: 20/20

## 2019-06-26 ASSESSMENT — EXTERNAL EXAM - RIGHT EYE: OD_EXAM: NORMAL

## 2019-06-26 ASSESSMENT — EXTERNAL EXAM - LEFT EYE: OS_EXAM: NORMAL

## 2019-06-26 NOTE — LETTER
2019         RE:  :  MRN: Jose Alejandro Deras  1965  [id number]     Dear Dr. Eagle,    Thank you for asking me to see your patient, Jose Alejandro Deras, for an oculoplastic   consultation.  My assessment and plan are below.  For further details, please see my attached clinic note.      HPI  Jose Alejandro Deras is a 54 year old male referred by Dr Eagle who presents for evaluation of left eye tearing that has been present for the past 5-6 months only with eating. It does not matter what food/type of food/spiciness. Pt was diagnosed with [place] palsy 2 years ago with subsequent resolution, though patient notes some persistent dimpling of left cheek that he feels is improving. No eye pain, periorbital pain, vision changes or other ocular concerns.          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 Epiphora due to excess lacrimation of left side    5. Crocodile tears syndrome    6. Facial nerve disorder    7. Synkinesis of left upper eyelid       Cranial nerves intact, 2-12 other than very mild left nasolabial fold asymmetry with smiling. Symmetrical, normal appearing lacrimal glands of both eyes. No palpable periorbital or facial mass. Normal anterior eye exam.     This is aberrant nerve regeneration (Crocodile tears)    Probe/irrigation: wide open    Plan:  Discussed options of Botox as treatment option for synkinesis. Patient prefers to observe at this time.     Will call if desires Botox injection into lacrimal gland on the left, will need prior authorization    Patient disposition:   No follow-ups on file.         Again, thank you for allowing me to participate in the care of your patient.      Sincerely,    Asya Eden MD  Department of Ophthalmology and Visual Neurosciences  [facility]    CC: [place]  [address]  [place] [address]  VIA In Basket

## 2019-06-26 NOTE — NURSING NOTE
Patient presents with:  Tearing Evaluation: Here at the request of Dr. Eagle for tearing evaluation left eye.  Was put on antibiotic for [place] Palsy 10/17- Now has notice a dimpling on cheek and watering when eating.       Referring Provider:  [place]  [address] [place] [address]        Lauren Garcia, COT

## 2019-06-26 NOTE — PROGRESS NOTES
HPI  Jose Alejandro Deras is a 54 year old male referred by Dr Eagle who presents for evaluation of left eye tearing that has been present for the past 5-6 months only with eating. It does not matter what food/type of food/spiciness. Pt was diagnosed with [place] palsy 2 years ago with subsequent resolution, though patient notes some persistent dimpling of left cheek that he feels is improving. No eye pain, periorbital pain, vision changes or other ocular concerns.    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 Epiphora due to excess lacrimation of left side    5. Crocodile tears syndrome    6. Facial nerve disorder    7. Synkinesis of left upper eyelid       Cranial nerves intact, 2-12 other than very mild left nasolabial fold asymmetry with smiling. Symmetrical, normal appearing lacrimal glands of both eyes. No palpable periorbital or facial mass. Normal anterior eye exam.     This is aberrant nerve regeneration (Crocodile tears)    Probe/irrigation: wide open    Plan:  Discussed options of Botox as treatment option for synkinesis. Patient prefers to observe at this time.     Will call if desires Botox injection into lacrimal gland on the left, will need prior authorization    Patient disposition:   No follow-ups on file.          Don Bartlett MD  Department of Ophthalmology - PGY2    Attending Physician Attestation:  Complete documentation of historical and exam elements from today's encounter can be found in the full encounter summary report (not reduplicated in this progress note).  I personally obtained the chief complaint(s) and history of present illness.  I confirmed and edited as necessary the review of systems, past medical/surgical history, family history, social history, and examination findings as documented by others; and I  examined the patient myself.  I personally reviewed the relevant tests, images, and reports as documented above.  I formulated and edited as necessary the assessment and plan and discussed the findings and management plan with the patient and family. - Asya Eden MD      DIAGNOSIS: Epiphora left eye  PROCEDURE: Punctal dilation and lacrimal irrigation left eye  SURGEON: Asya Eden MD  ANESTHESIA: Topical  COMPLICATIONS: None  EBL: Nil  FINDINGS: n/a% block right eye,  0% block left eye    The patient was placed supine in the examining chair. Tetracaine was placed in each eye.  The punctum was anesthesized with 2% lidocaine on a cotton tip applicator.  The punctum was dilated with punctal dilator. The 25 gauge lacrimal cannula was placed in the proximal canaliculus and the lacrimal system irrigated with water.  The patient tolerated the procedure well.     Asya Eden  June 26, 2019  10:08 AM  I was present for the entire procedure - Asya Eden MD

## 2019-08-21 ENCOUNTER — OFFICE VISIT (OUTPATIENT)
Dept: OPHTHALMOLOGY | Facility: CLINIC | Age: 54
End: 2019-08-21
Payer: COMMERCIAL

## 2019-08-21 DIAGNOSIS — R25.8 SYNKINESIS OF LEFT UPPER EYELID: ICD-10-CM

## 2019-08-21 DIAGNOSIS — G51.9 FACIAL NERVE DISORDER: ICD-10-CM

## 2019-08-21 DIAGNOSIS — G51.8 CROCODILE TEARS SYNDROME: Primary | ICD-10-CM

## 2019-08-21 DIAGNOSIS — H04.212 EPIPHORA DUE TO EXCESS LACRIMATION OF LEFT SIDE: ICD-10-CM

## 2019-08-21 PROCEDURE — 99213 OFFICE O/P EST LOW 20 MIN: CPT | Performed by: OPHTHALMOLOGY

## 2019-08-21 ASSESSMENT — VISUAL ACUITY
OD_SC+: -1
OD_SC: 20/20
OS_SC: 20/25
METHOD: SNELLEN - LINEAR
OS_SC+: -2

## 2019-08-21 ASSESSMENT — EXTERNAL EXAM - RIGHT EYE: OD_EXAM: NORMAL

## 2019-08-21 NOTE — NURSING NOTE
Patient presents with:  Follow Up: Epiphora left eye and Facial nerve disorder.  OS still londono when eating and feels jaw is sunk in.  Doesn't like the way it looks.       Lab Results   Component Value Date    A1C 5.7 08/14/2017    A1C 6.0 04/20/2016    A1C 5.9 08/23/2013       Referring Provider:  Maurice Barroso MD  [address] [place] [address]        Lauren Garcia, COT

## 2019-08-21 NOTE — PROGRESS NOTES
Chief Complaint(s) and History of Present Illness(es)     Follow Up     Laterality: left eye    Onset: 8 months ago    Associated symptoms: tearing (Only when eating. )    Treatments tried: no treatments    Pain scale: 0/10    Comments: Epiphora left eye and Facial nerve disorder.  OS still londono   when eating and feels jaw is sunk in.  Doesn't like the way it looks.        history of bells palsy with near complete recovery with exception of crocodile tears (tearing when eating). Very bothered by midface atrophy on the left. No new weakness or numbness.          Assessment & Plan     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 Epiphora due to excess lacrimation of left side       Discussed options:  Observation  Botox to left lacrimal gland (common risks would include ptosis, diplopia, dry eye, failure to achieve desired results).   Filler to left midface (would be out of pocket)    He will think about the option sand call if decides to schedule.           Attending Physician Attestation:  Complete documentation of historical and exam elements from today's encounter can be found in the full encounter summary report (not reduplicated in this progress note).  I personally obtained the chief complaint(s) and history of present illness.  I confirmed and edited as necessary the review of systems, past medical/surgical history, family history, social history, and examination findings as documented by others; and I examined the patient myself.  I personally reviewed the relevant tests, images, and reports as documented above.  I formulated and edited as necessary the assessment and plan and discussed the findings and management plan with the patient and family. - Asya Eden MD

## 2019-11-24 ENCOUNTER — TELEPHONE (OUTPATIENT)
Dept: FAMILY MEDICINE | Facility: CLINIC | Age: 54
End: 2019-11-24

## 2019-11-24 DIAGNOSIS — I10 ESSENTIAL HYPERTENSION WITH GOAL BLOOD PRESSURE LESS THAN 140/90: ICD-10-CM

## 2019-11-24 DIAGNOSIS — E78.5 HYPERLIPIDEMIA LDL GOAL <130: ICD-10-CM

## 2019-11-24 NOTE — TELEPHONE ENCOUNTER
"Requested Prescriptions   Pending Prescriptions Disp Refills     chlorthalidone (HYGROTON) 25 MG tablet [Pharmacy Med Name: Chlorthalidone Oral Tablet 25 MG] 30 tablet 0     Sig: Take 1 tablet (25 mg) by mouth daily for blood pressure.         Last Written Prescription Date:  2/26/19  Last Fill Quantity: 90,  # refills: 1   Last Office Visit with [facility] or [facility] prescribing provider:  2/26/19   Future Office Visit:    Next 5 appointments (look out 90 days)    Nov 26, 2019 10:00 AM CST  Office Visit with Maurice Barroso MD  [facility] [address] [place] [id number] [phone]             Diuretics (Including Combos) Protocol Passed - 11/24/2019  1:14 PM        Passed - Blood pressure under 140/90 in past 12 months     BP Readings from Last 3 Encounters:   02/26/19 129/77   02/12/18 139/78   01/09/18 122/69                 Passed - Recent (12 mo) or future (30 days) visit within the authorizing provider's specialty     Patient has had an office visit with the authorizing provider or a provider within the authorizing providers department within the previous 12 mos or has a future within next 30 days. See \"Patient Info\" tab in inbasket, or \"Choose Columns\" in Meds & Orders section of the refill encounter.              Passed - Medication is active on med list        Passed - Patient is age 18 or older        Passed - Normal serum creatinine on file in past 12 months     Recent Labs   Lab Test 02/26/19  1733   CR 0.98              Passed - Normal serum potassium on file in past 12 months     Recent Labs   Lab Test 02/26/19  1733   POTASSIUM 3.8                    Passed - Normal serum sodium on file in past 12 months     Recent Labs   Lab Test 02/26/19  1733                 simvastatin (ZOCOR) 40 MG tablet [Pharmacy Med Name: Simvastatin Oral Tablet 40 MG] 30 tablet 0     Sig: Take 1 tablet (40 mg) by mouth every evening for " "cholesterol.         Last Written Prescription Date:  2/26/19  Last Fill Quantity: 90,  # refills: 1   Last Office Visit with [facility] or  Health prescribing provider:  2/26/19   Future Office Visit:    Next 5 appointments (look out 90 days)    Nov 26, 2019 10:00 AM CST  Office Visit with Maurice Barroso MD  [facility] [address] [facility] [address]  [phone]             Statins Protocol Passed - 11/24/2019  1:14 PM        Passed - LDL on file in past 12 months     Recent Labs   Lab Test 02/26/19  1733   LDL 88             Passed - No abnormal creatine kinase in past 12 months     No lab results found.             Passed - Recent (12 mo) or future (30 days) visit within the authorizing provider's specialty     Patient has had an office visit with the authorizing provider or a provider within the authorizing providers department within the previous 12 mos or has a future within next 30 days. See \"Patient Info\" tab in inbasket, or \"Choose Columns\" in Meds & Orders section of the refill encounter.              Passed - Medication is active on med list        Passed - Patient is age 18 or older        lisinopril (PRINIVIL/ZESTRIL) 20 MG tablet [Pharmacy Med Name: Lisinopril Oral Tablet 20 MG] 30 tablet 0     Sig: Take 1 tablet (20 mg) by mouth daily for blood pressure.           Last Written Prescription Date:  2/26/19  Last Fill Quantity: 90,  # refills: 1   Last Office Visit with [facility], [facility] or [facility] prescribing provider:  2/26/19   Future Office Visit:    Next 5 appointments (look out 90 days)    Nov 26, 2019 10:00 AM CST  Office Visit with Maurice Barroso MD  [facility]) [address] [facility] [address]  [phone]             ACE Inhibitors (Including Combos) Protocol Passed - 11/24/2019  1:14 PM        Passed - Blood pressure under 140/90 in past 12 months    " " BP Readings from Last 3 Encounters:   02/26/19 129/77   02/12/18 139/78   01/09/18 122/69                 Passed - Recent (12 mo) or future (30 days) visit within the authorizing provider's specialty     Patient has had an office visit with the authorizing provider or a provider within the authorizing providers department within the previous 12 mos or has a future within next 30 days. See \"Patient Info\" tab in inbasket, or \"Choose Columns\" in Meds & Orders section of the refill encounter.              Passed - Medication is active on med list        Passed - Patient is age 18 or older        Passed - Normal serum creatinine on file in past 12 months     Recent Labs   Lab Test 02/26/19  1733   CR 0.98             Passed - Normal serum potassium on file in past 12 months     Recent Labs   Lab Test 02/26/19  1733   POTASSIUM 3.8                   Dax Faarax  [facility]  "

## 2019-11-25 RX ORDER — CHLORTHALIDONE 25 MG/1
25 TABLET ORAL DAILY
Qty: 90 TABLET | Refills: 0 | Status: SHIPPED | OUTPATIENT
Start: 2019-11-25 | End: 2019-11-26

## 2019-11-25 RX ORDER — SIMVASTATIN 40 MG
40 TABLET ORAL EVERY EVENING
Qty: 90 TABLET | Refills: 0 | Status: SHIPPED | OUTPATIENT
Start: 2019-11-25 | End: 2019-11-26

## 2019-11-25 RX ORDER — LISINOPRIL 20 MG/1
20 TABLET ORAL DAILY
Qty: 90 TABLET | Refills: 0 | Status: SHIPPED | OUTPATIENT
Start: 2019-11-25 | End: 2019-11-26

## 2019-11-25 NOTE — TELEPHONE ENCOUNTER
Prescription approved per [facility] Refill Protocol.      Shannan Key RN, BSN, PHN

## 2019-11-25 NOTE — TELEPHONE ENCOUNTER
Reason for Call:  Other prescription    Detailed comments: Needs today call when ready. He is traveling out of town tomorrow.    Phone Number Patient can be reached at: Cell number on file:    Telephone Information:   Mobile [phone]     Best Time: any    Can we leave a detailed message on this number? YES    Call taken on 11/25/2019 at 8:34 AM by Ngoc Renee

## 2019-11-26 ENCOUNTER — OFFICE VISIT (OUTPATIENT)
Dept: FAMILY MEDICINE | Facility: CLINIC | Age: 54
End: 2019-11-26

## 2019-11-26 VITALS
DIASTOLIC BLOOD PRESSURE: 76 MMHG | BODY MASS INDEX: 29.79 KG/M2 | TEMPERATURE: 98.7 F | HEIGHT: 67 IN | WEIGHT: 189.8 LBS | OXYGEN SATURATION: 97 % | RESPIRATION RATE: 18 BRPM | SYSTOLIC BLOOD PRESSURE: 121 MMHG | HEART RATE: 81 BPM

## 2019-11-26 DIAGNOSIS — E78.5 HYPERLIPIDEMIA LDL GOAL <130: ICD-10-CM

## 2019-11-26 DIAGNOSIS — Z12.11 COLON CANCER SCREENING: ICD-10-CM

## 2019-11-26 DIAGNOSIS — I10 ESSENTIAL HYPERTENSION WITH GOAL BLOOD PRESSURE LESS THAN 140/90: Primary | ICD-10-CM

## 2019-11-26 DIAGNOSIS — Z28.21 VACCINATION NOT CARRIED OUT BECAUSE OF PATIENT REFUSAL: ICD-10-CM

## 2019-11-26 DIAGNOSIS — R73.01 IMPAIRED FASTING GLUCOSE: ICD-10-CM

## 2019-11-26 LAB
ALT SERPL W P-5'-P-CCNC: 24 U/L (ref 0–70)
CHOLEST SERPL-MCNC: 187 MG/DL
CREAT SERPL-MCNC: 0.89 MG/DL (ref 0.66–1.25)
GFR SERPL CREATININE-BSD FRML MDRD: >90 ML/MIN/{1.73_M2}
GLUCOSE SERPL-MCNC: 95 MG/DL (ref 70–99)
HBA1C MFR BLD: 5.8 % (ref 0–5.6)
HDLC SERPL-MCNC: 54 MG/DL
LDLC SERPL CALC-MCNC: 113 MG/DL
NONHDLC SERPL-MCNC: 133 MG/DL
POTASSIUM SERPL-SCNC: 4.4 MMOL/L (ref 3.4–5.3)
TRIGL SERPL-MCNC: 99 MG/DL

## 2019-11-26 PROCEDURE — 83036 HEMOGLOBIN GLYCOSYLATED A1C: CPT | Performed by: FAMILY MEDICINE

## 2019-11-26 PROCEDURE — 36415 COLL VENOUS BLD VENIPUNCTURE: CPT | Performed by: FAMILY MEDICINE

## 2019-11-26 PROCEDURE — 80061 LIPID PANEL: CPT | Performed by: FAMILY MEDICINE

## 2019-11-26 PROCEDURE — 82947 ASSAY GLUCOSE BLOOD QUANT: CPT | Performed by: FAMILY MEDICINE

## 2019-11-26 PROCEDURE — 99214 OFFICE O/P EST MOD 30 MIN: CPT | Performed by: FAMILY MEDICINE

## 2019-11-26 PROCEDURE — 82565 ASSAY OF CREATININE: CPT | Performed by: FAMILY MEDICINE

## 2019-11-26 PROCEDURE — 84460 ALANINE AMINO (ALT) (SGPT): CPT | Performed by: FAMILY MEDICINE

## 2019-11-26 PROCEDURE — 84132 ASSAY OF SERUM POTASSIUM: CPT | Performed by: FAMILY MEDICINE

## 2019-11-26 RX ORDER — LISINOPRIL 20 MG/1
20 TABLET ORAL DAILY
Qty: 30 TABLET | Refills: 5 | Status: SHIPPED | OUTPATIENT
Start: 2019-11-26 | End: 2020-01-28

## 2019-11-26 RX ORDER — CHLORTHALIDONE 25 MG/1
25 TABLET ORAL DAILY
Qty: 30 TABLET | Refills: 5 | Status: SHIPPED | OUTPATIENT
Start: 2019-11-26 | End: 2020-01-28

## 2019-11-26 RX ORDER — SIMVASTATIN 40 MG
40 TABLET ORAL EVERY EVENING
Qty: 30 TABLET | Refills: 5 | Status: SHIPPED | OUTPATIENT
Start: 2019-11-26 | End: 2020-01-28

## 2019-11-26 ASSESSMENT — MIFFLIN-ST. JEOR: SCORE: 1654.56

## 2019-11-26 ASSESSMENT — PAIN SCALES - GENERAL: PAINLEVEL: NO PAIN (0)

## 2019-11-26 NOTE — PROGRESS NOTES
"Subjective     Jose Alejandro Deras is a 54 year old male who presents to clinic today for the following health issues:    HPI   Hyperlipidemia Follow-Up      Are you regularly taking any medication or supplement to lower your cholesterol?   Yes- Simvastatin    Are you having muscle aches or other side effects that you think could be caused by your cholesterol lowering medication?  No    Hypertension Follow-up      Do you check your blood pressure regularly outside of the clinic? Yes     Are you following a low salt diet? Yes    Are your blood pressures ever more than 140 on the top number (systolic) OR more   than 90 on the bottom number (diastolic), for example 140/90? No      How many servings of fruits and vegetables do you eat daily?  4 or more    On average, how many sweetened beverages do you drink each day (Examples: soda, juice, sweet tea, etc.  Do NOT count diet or artificially sweetened beverages)?   0    How many days per week do you miss taking your medication? 0    Past medical, family, and social histories, medications, and allergies are reviewed and updated in [facility].    Review of Systems   ROS COMP: Constitutional, HEENT, cardiovascular, pulmonary, gi and gu systems are negative, except as otherwise noted.    Bell's Palsy: He tears up every time he chews. It's better than it was before. His left cheek was \"sinking in.\" He was supposed to get a shot.    This document serves as a record of the services and decisions personally performed and made by Dr. Barroso. It was created on his behalf by Chely Prabhakar, a trained medical scribe. The creation of this document is based the provider's statements to the medical scribe.  Chely Prabhakar,  10:23 AM     Objective    /76 (BP Location: Left arm, Patient Position: Sitting, Cuff Size: Adult Large)   Pulse 81   Temp 98.7  F (37.1  C) (Oral)   Resp 18   Ht 1.702 m (5' 7\")   Wt 86.1 kg (189 lb 12.8 oz)   SpO2 97%   BMI 29.73 kg/m    Body mass index is " 29.73 kg/m .     Physical Exam   GENERAL: healthy, alert and no distress  EYES: Eyes grossly normal to inspection, PERRL, EOMI, sclerae white and conjunctivae normal  RESP: lungs clear to auscultation - no crackles or wheezes, no areas of dullness, no tachypnea  CV: Heart regular rate and rhythm without murmur, click or rub. No peripheral edema and peripheral pulses strong  MS: no gross musculoskeletal defects noted, no edema  SKIN: no suspicious lesions or rashes to visible skin  NEURO: Normal strength and tone, sensory exam grossly normal, mentation intact, oriented times 3 and cranial nerves 2-12 intact  PSYCH: mentation appears normal, affect normal/bright    Diagnostic Test Results:   Ref. Range 2/26/2019 17:33   Sodium Latest Ref Range: 133 - 144 mmol/L 142   Potassium Latest Ref Range: 3.4 - 5.3 mmol/L 3.8   Chloride Latest Ref Range: 94 - 109 mmol/L 107   Carbon Dioxide Latest Ref Range: 20 - 32 mmol/L 29   Urea Nitrogen Latest Ref Range: 7 - 30 mg/dL 16   Creatinine Latest Ref Range: 0.66 - 1.25 mg/dL 0.98   GFR Estimate Latest Ref Range: >60 mL/min/1.73_m2 86   GFR Estimate If Black Latest Ref Range: >60 mL/min/1.73_m2 >90   Calcium Latest Ref Range: 8.5 - 10.1 mg/dL 8.9   Anion Gap Latest Ref Range: 3 - 14 mmol/L 6   ALT Latest Ref Range: 0 - 70 U/L 24   Cholesterol Latest Ref Range: <200 mg/dL 152   HDL Cholesterol Latest Ref Range: >39 mg/dL 44   LDL Cholesterol Calculated Latest Ref Range: <100 mg/dL 88   Non HDL Cholesterol Latest Ref Range: <130 mg/dL 108   Triglycerides Latest Ref Range: <150 mg/dL 102   Glucose Latest Ref Range: 70 - 99 mg/dL 104 (H)         Assessment & Plan      (I10) Essential hypertension with goal blood pressure less than 140/90  (primary encounter diagnosis)  Comment: well-controlled   Plan: chlorthalidone (HYGROTON) 25 MG tablet,         lisinopril (PRINIVIL/ZESTRIL) 20 MG tablet,         Potassium, Creatinine        Return in about 25 weeks (around 5/19/2020) for full  physical, lab tests, recheck medications.      (E78.5) Hyperlipidemia LDL goal <130  Comment: fasting, historically at goal   Plan: simvastatin (ZOCOR) 40 MG tablet, Lipid panel         reflex to direct LDL Non-fasting, ALT        Return in about 25 weeks (around 5/19/2020) for full physical, lab tests, recheck medications.      (R73.01) Impaired fasting glucose  Comment: normal HgbA1c on last check  Plan: Glucose, Hemoglobin A1c        Follow-up based on results    (Z12.11) Colon cancer screening  Comment:   Plan: Fecal colorectal cancer screen FIT        Due in February    (Z28.21) Vaccination not carried out because of patient refusal  Comment: Influenza vaccine offered but declined by the patient.   Plan:      The information in this document, created by the medical scribe for me, accurately reflects the services I personally performed and the decisions made by me. I have reviewed and approved this document for accuracy prior to leaving the patient care area.  Maurice Barroso MD

## 2019-11-26 NOTE — TELEPHONE ENCOUNTER
Called, patient is notified rx has been sent to their pharmacy.  Vinay Camacho,  For [facility] ([facility])

## 2019-12-02 ENCOUNTER — OFFICE VISIT (OUTPATIENT)
Dept: FAMILY MEDICINE | Facility: CLINIC | Age: 54
End: 2019-12-02

## 2019-12-02 VITALS
WEIGHT: 193 LBS | SYSTOLIC BLOOD PRESSURE: 110 MMHG | TEMPERATURE: 99.4 F | BODY MASS INDEX: 30.29 KG/M2 | DIASTOLIC BLOOD PRESSURE: 67 MMHG | HEIGHT: 67 IN | OXYGEN SATURATION: 95 % | HEART RATE: 83 BPM

## 2019-12-02 DIAGNOSIS — R73.03 PREDIABETES: ICD-10-CM

## 2019-12-02 DIAGNOSIS — R50.9 FEELS FEVERISH: ICD-10-CM

## 2019-12-02 DIAGNOSIS — R71.0 DECREASED HEMOGLOBIN: ICD-10-CM

## 2019-12-02 DIAGNOSIS — M75.82 TENDINITIS OF LEFT ROTATOR CUFF: ICD-10-CM

## 2019-12-02 DIAGNOSIS — M75.42 IMPINGEMENT SYNDROME OF LEFT SHOULDER: Primary | ICD-10-CM

## 2019-12-02 LAB
BASOPHILS # BLD AUTO: 0 10E9/L (ref 0–0.2)
BASOPHILS # BLD AUTO: 0 10E9/L (ref 0–0.2)
BASOPHILS NFR BLD AUTO: 0.5 %
BASOPHILS NFR BLD AUTO: 0.5 %
DIFFERENTIAL METHOD BLD: ABNORMAL
DIFFERENTIAL METHOD BLD: ABNORMAL
EOSINOPHIL # BLD AUTO: 0.1 10E9/L (ref 0–0.7)
EOSINOPHIL # BLD AUTO: 0.1 10E9/L (ref 0–0.7)
EOSINOPHIL NFR BLD AUTO: 0.9 %
EOSINOPHIL NFR BLD AUTO: 0.9 %
ERYTHROCYTE [DISTWIDTH] IN BLOOD BY AUTOMATED COUNT: 13.7 % (ref 10–15)
ERYTHROCYTE [DISTWIDTH] IN BLOOD BY AUTOMATED COUNT: 13.9 % (ref 10–15)
FERRITIN SERPL-MCNC: 128 NG/ML (ref 26–388)
FOLATE SERPL-MCNC: 9.9 NG/ML
HCT VFR BLD AUTO: 38 % (ref 40–53)
HCT VFR BLD AUTO: 39.4 % (ref 40–53)
HGB BLD-MCNC: 12.6 G/DL (ref 13.3–17.7)
HGB BLD-MCNC: 13 G/DL (ref 13.3–17.7)
IRON SATN MFR SERPL: 14 % (ref 15–46)
IRON SERPL-MCNC: 41 UG/DL (ref 35–180)
LYMPHOCYTES # BLD AUTO: 2.4 10E9/L (ref 0.8–5.3)
LYMPHOCYTES # BLD AUTO: 2.9 10E9/L (ref 0.8–5.3)
LYMPHOCYTES NFR BLD AUTO: 29.8 %
LYMPHOCYTES NFR BLD AUTO: 35.6 %
MCH RBC QN AUTO: 29.3 PG (ref 26.5–33)
MCH RBC QN AUTO: 29.4 PG (ref 26.5–33)
MCHC RBC AUTO-ENTMCNC: 33 G/DL (ref 31.5–36.5)
MCHC RBC AUTO-ENTMCNC: 33.2 G/DL (ref 31.5–36.5)
MCV RBC AUTO: 89 FL (ref 78–100)
MCV RBC AUTO: 89 FL (ref 78–100)
MONOCYTES # BLD AUTO: 1 10E9/L (ref 0–1.3)
MONOCYTES # BLD AUTO: 1.1 10E9/L (ref 0–1.3)
MONOCYTES NFR BLD AUTO: 11.8 %
MONOCYTES NFR BLD AUTO: 13.4 %
NEUTROPHILS # BLD AUTO: 4.2 10E9/L (ref 1.6–8.3)
NEUTROPHILS # BLD AUTO: 4.5 10E9/L (ref 1.6–8.3)
NEUTROPHILS NFR BLD AUTO: 51.2 %
NEUTROPHILS NFR BLD AUTO: 55.4 %
PLATELET # BLD AUTO: 299 10E9/L (ref 150–450)
PLATELET # BLD AUTO: 299 10E9/L (ref 150–450)
RBC # BLD AUTO: 4.28 10E12/L (ref 4.4–5.9)
RBC # BLD AUTO: 4.43 10E12/L (ref 4.4–5.9)
RETICS # AUTO: 71.1 10E9/L (ref 25–95)
RETICS/RBC NFR AUTO: 1.5 % (ref 0.5–2)
TIBC SERPL-MCNC: 295 UG/DL (ref 240–430)
TSH SERPL DL<=0.005 MIU/L-ACNC: 1.85 MU/L (ref 0.4–4)
VIT B12 SERPL-MCNC: 494 PG/ML (ref 193–986)
WBC # BLD AUTO: 8.2 10E9/L (ref 4–11)
WBC # BLD AUTO: 8.2 10E9/L (ref 4–11)

## 2019-12-02 PROCEDURE — 99000 SPECIMEN HANDLING OFFICE-LAB: CPT | Performed by: FAMILY MEDICINE

## 2019-12-02 PROCEDURE — 99213 OFFICE O/P EST LOW 20 MIN: CPT | Performed by: FAMILY MEDICINE

## 2019-12-02 PROCEDURE — 36415 COLL VENOUS BLD VENIPUNCTURE: CPT | Performed by: FAMILY MEDICINE

## 2019-12-02 PROCEDURE — 82728 ASSAY OF FERRITIN: CPT | Performed by: FAMILY MEDICINE

## 2019-12-02 PROCEDURE — 85045 AUTOMATED RETICULOCYTE COUNT: CPT | Performed by: FAMILY MEDICINE

## 2019-12-02 PROCEDURE — 83021 HEMOGLOBIN CHROMOTOGRAPHY: CPT | Mod: 90 | Performed by: FAMILY MEDICINE

## 2019-12-02 PROCEDURE — 85060 BLOOD SMEAR INTERPRETATION: CPT | Performed by: FAMILY MEDICINE

## 2019-12-02 PROCEDURE — 84443 ASSAY THYROID STIM HORMONE: CPT | Performed by: FAMILY MEDICINE

## 2019-12-02 PROCEDURE — 85025 COMPLETE CBC W/AUTO DIFF WBC: CPT | Mod: 91 | Performed by: FAMILY MEDICINE

## 2019-12-02 PROCEDURE — 82607 VITAMIN B-12: CPT | Performed by: FAMILY MEDICINE

## 2019-12-02 PROCEDURE — 83540 ASSAY OF IRON: CPT | Performed by: FAMILY MEDICINE

## 2019-12-02 PROCEDURE — 84238 ASSAY NONENDOCRINE RECEPTOR: CPT | Mod: 90 | Performed by: FAMILY MEDICINE

## 2019-12-02 PROCEDURE — 83550 IRON BINDING TEST: CPT | Performed by: FAMILY MEDICINE

## 2019-12-02 PROCEDURE — 82746 ASSAY OF FOLIC ACID SERUM: CPT | Performed by: FAMILY MEDICINE

## 2019-12-02 RX ORDER — METFORMIN HCL 500 MG
500 TABLET, EXTENDED RELEASE 24 HR ORAL
Qty: 90 TABLET | Refills: 1 | Status: SHIPPED | OUTPATIENT
Start: 2019-12-02 | End: 2020-04-20

## 2019-12-02 ASSESSMENT — MIFFLIN-ST. JEOR: SCORE: 1669.07

## 2019-12-02 ASSESSMENT — PAIN SCALES - GENERAL: PAINLEVEL: EXTREME PAIN (8)

## 2019-12-02 NOTE — PATIENT INSTRUCTIONS
At [facility], we strive to deliver an exceptional experience to you, every time we see you. If you receive a survey, please complete it as we do value your feedback.  If you have MyChart, you can expect to receive results automatically within 24 hours of their completion.  Your provider will send a note interpreting your results as well.   If you do not have MyChart, you should receive your results in about a week by mail.    Your care team:                            Family Medicine Internal Medicine   MD Bijan Medrano MD Shantel Branch-Fleming, MD Katya Georgiev PA-C Megan Hill, APRJAVIER Morrison, MD Pediatrics   Jose Amaya, PAWiliamC  Radha Benjamin, MD Lydia Briones APRN CNP   MD Teri Weinberg MD Deborah Mielke, MD Kim Thein, APRN CNP      Clinic hours: Monday - Thursday 7 am-7 pm; Fridays 7 am-5 pm.   Urgent care: Monday - Friday 11 am-9 pm; Saturday and Sunday 9 am-5 pm.  Pharmacy : Monday -Thursday 8 am-8 pm; Friday 8 am-6 pm; Saturday and Sunday 9 am-5 pm.     Clinic: [phone]   Pharmacy: [phone]        
Yes

## 2019-12-02 NOTE — PROGRESS NOTES
"Subjective     Jose Alejandro Deras is a 54 year old male who presents to clinic today for the following health issues:    HPI   Joint Pain    Onset: about 3 days ago    Description:   Location: left shoulder  Character: bad pain    Intensity: 8/10    Progression of Symptoms: worse    Accompanying Signs & Symptoms:  Other symptoms: none    History:   Previous similar pain: YES - original onset October 2013    Precipitating factors:   Trauma or overuse: no     Alleviating factors:  Improved by: had a steroid injections several times over the years, most recently 2/15/17    Therapies Tried and outcome: Tylenol    Illness  He's been feeling feverish in the evenings as of 11/29.    Past medical, family, and social histories, medications, and allergies are reviewed and updated in [facility].    Review of Systems   ROS COMP: Constitutional, HEENT, cardiovascular, pulmonary, gi and gu systems are negative, except as otherwise noted.      This document serves as a record of the services and decisions personally performed and made by Dr. Barroso. It was created on his behalf by Chely Prabhakar, a trained medical scribe. The creation of this document is based the provider's statements to the medical scribe.  Chely Prabhakar,  2:31 PM     Objective    /67 (BP Location: Left arm, Patient Position: Chair, Cuff Size: Adult Large)   Pulse 83   Temp 99.4  F (37.4  C) (Oral)   Ht 1.702 m (5' 7\")   Wt 87.5 kg (193 lb)   SpO2 95%   BMI 30.23 kg/m    Body mass index is 30.23 kg/m .     Physical Exam   GENERAL APPEARANCE ADULT: Alert, no acute distress  EYES: PERRL, EOM normal, conjunctiva and lids normal  HENT: right TM normal, left TM normal, nose no nasal discharge or congestion, frontal sinus tenderness no, maxillary sinus tenderness no, throat/mouth:normal, mucous membranes moist  NECK: No adenopathy,masses or thyromegaly  RESP: lungs clear to auscultation , no rales or rhonchi  CV: normal rate, regular rhythm, no murmur or " gallop  SKIN: no suspicious lesions or rashes to visible skin   NEURO: Alert, oriented, speech and mentation normal, Cranial nerves 2-12 are normal.  PSYCH: mentation appears normal., affect and mood normal    Diagnostic Test Results:   Ref. Range 11/26/2019 11:03   Potassium Latest Ref Range: 3.4 - 5.3 mmol/L 4.4   Creatinine Latest Ref Range: 0.66 - 1.25 mg/dL 0.89   GFR Estimate Latest Ref Range: >60 mL/min/1.73_m2 >90   GFR Estimate If Black Latest Ref Range: >60 mL/min/1.73_m2 >90   ALT Latest Ref Range: 0 - 70 U/L 24   Hemoglobin A1C Latest Ref Range: 0 - 5.6 % 5.8 (H)   Cholesterol Latest Ref Range: <200 mg/dL 187   HDL Cholesterol Latest Ref Range: >39 mg/dL 54   LDL Cholesterol Calculated Latest Ref Range: <100 mg/dL 113 (H)   Non HDL Cholesterol Latest Ref Range: <130 mg/dL 133 (H)   Triglycerides Latest Ref Range: <150 mg/dL 99   Glucose Latest Ref Range: 70 - 99 mg/dL 95       Results for orders placed or performed in visit on 12/02/19 (from the past 24 hour(s))   CBC with platelets differential   Result Value Ref Range    WBC 8.2 4.0 - 11.0 10e9/L    RBC Count 4.28 (L) 4.4 - 5.9 10e12/L    Hemoglobin 12.6 (L) 13.3 - 17.7 g/dL    Hematocrit 38.0 (L) 40.0 - 53.0 %    MCV 89 78 - 100 fl    MCH 29.4 26.5 - 33.0 pg    MCHC 33.2 31.5 - 36.5 g/dL    RDW 13.7 10.0 - 15.0 %    Platelet Count 299 150 - 450 10e9/L    % Neutrophils 51.2 %    % Lymphocytes 35.6 %    % Monocytes 11.8 %    % Eosinophils 0.9 %    % Basophils 0.5 %    Absolute Neutrophil 4.2 1.6 - 8.3 10e9/L    Absolute Lymphocytes 2.9 0.8 - 5.3 10e9/L    Absolute Monocytes 1.0 0.0 - 1.3 10e9/L    Absolute Eosinophils 0.1 0.0 - 0.7 10e9/L    Absolute Basophils 0.0 0.0 - 0.2 10e9/L    Diff Method Automated Method                 Assessment & Plan   (M75.42) Impingement syndrome of left shoulder  (primary encounter diagnosis)  (M75.82) Tendinitis of left rotator cuff  Comment: recurrent symptoms without a known trigger  Plan: ORTHO  REFERRAL             (R73.03) Prediabetes  Comment: the patient is given the option of medication, referral to a dietitian, or prediabetes classes  Plan: TSH with free T4 reflex, metFORMIN         (GLUCOPHAGE-XR) 500 MG 24 hr tablet        Return in about 6 months (around 5/19/2020) for full physical, recheck medications, lab tests.      (R50.9) Feels feverish  Comment: no physical or lab findings  Plan: CBC with platelets differential, TSH with free         T4 reflex            (R71.0) Decreased hemoglobin  Comment: mild incidental finding  Plan: Iron and iron binding capacity, Ferritin, HGB         Eval Reflex to ELP or RBC Solubility, Blood         Morphology Pathologist Review, CBC with         platelets differential, Reticulocyte Count,         Vitamin B12, Folate, Soluble transferrin         receptor        Further follow-up will depend on results of these labs        The information in this document, created by the medical scribe for me, accurately reflects the services I personally performed and the decisions made by me. I have reviewed and approved this document for accuracy prior to leaving the patient care area.  Maurice Barroso MD

## 2019-12-02 NOTE — LETTER
"December 23, 2019        Jose Alejandro Deras  [address]  [place] [address]        Mr. Deras,    Your hemoglobin continues to be borderline below the \"normal\" range, and your other tests obvious iron deficiency or other cause for the abnormal test results. I don't believe any further testing is necessary, with one exception:  You have not done anything for colon cancer screening since 2005.  We ordered a colonoscopy in 2017, and we provided a FIT kit in November, but we have not received any results. Please contact us if you would like to do a colonoscopy.    Please contact the clinic if you have additional questions.  Thank you.    Sincerely,      Maurice Barroso MD/gloria      Resulted Orders   CBC with platelets differential   Result Value Ref Range    WBC 8.2 4.0 - 11.0 10e9/L    RBC Count 4.28 (L) 4.4 - 5.9 10e12/L    Hemoglobin 12.6 (L) 13.3 - 17.7 g/dL    Hematocrit 38.0 (L) 40.0 - 53.0 %    MCV 89 78 - 100 fl    MCH 29.4 26.5 - 33.0 pg    MCHC 33.2 31.5 - 36.5 g/dL    RDW 13.7 10.0 - 15.0 %    Platelet Count 299 150 - 450 10e9/L    % Neutrophils 51.2 %    % Lymphocytes 35.6 %    % Monocytes 11.8 %    % Eosinophils 0.9 %    % Basophils 0.5 %    Absolute Neutrophil 4.2 1.6 - 8.3 10e9/L    Absolute Lymphocytes 2.9 0.8 - 5.3 10e9/L    Absolute Monocytes 1.0 0.0 - 1.3 10e9/L    Absolute Eosinophils 0.1 0.0 - 0.7 10e9/L    Absolute Basophils 0.0 0.0 - 0.2 10e9/L    Diff Method Automated Method    TSH with free T4 reflex   Result Value Ref Range    TSH 1.85 0.40 - 4.00 mU/L   Iron and iron binding capacity   Result Value Ref Range    Iron 41 35 - 180 ug/dL    Iron Binding Cap 295 240 - 430 ug/dL    Iron Saturation Index 14 (L) 15 - 46 %   Ferritin   Result Value Ref Range    Ferritin 128 26 - 388 ng/mL   HGB Eval Reflex to ELP or RBC Solubility   Result Value Ref Range    Hemoglobin A1 96.4 95.0 - 97.9 %    Hemoglobin A2 3.0 2.0 - 3.5 %    Hemoglobin F 0.6 0.0 - 2.1 %      Comment:      (Note)  REFERENCE " INTERVAL: Hemoglobin F  Access complete set of age- and/or gender-specific   reference intervals for this test in the [facility]   Test Directory ([email or web address]).      Hemoglobin S Eval 0.0 0.0 - 0.0 %    Hemoglobin C 0.0 0.0 - 0.0 %    Hemoglobin E 0.0 0.0 - 0.0 %    Hemoglobin Other 0.0 0.0 - 0.0 %    HGB Abn Evaluation SEE NOTE       Comment:      (Note)  Impression: Normal HPLC evaluation.  This normal HPLC result does not rule out the possibility   of alpha globin gene deletions associated with silent   carrier status or alpha thalassemia trait. Individuals who   carry a rare, Greek beta thalassemia variant often have a   normal Hb A2 and may not be identified by this assay.   Please correlate with clinical and laboratory findings.      Sickle Cell Solubility Confirm Not Performed     Hemoglobin Capillary ELP Not Performed       Comment:      (Note)  Performed by [facility],  [address] [place] [id number] [phone]  [email or web address], Bryan Stevenson MD, Lab. Director     Blood Morphology Pathologist Review   Result Value Ref Range    Copath Report       Patient Name: MARCIO LOPEZ  MR#: [id number]  Specimen #: [id number]  Collected: 12/2/2019  Received: 12/3/2019  Reported: 12/5/2019 23:24  Ordering Phy(s): BERTA BARRIOS    For improved result formatting, select 'View Enhanced Report Format' under   Linked Documents section.    TEST(S):  Peripheral Smear Morphology    FINAL DIAGNOSIS:  Peripheral Smear Morphology:  - Isolated borderline normochromic, normocytic anemia with low normal red   cell count without evidence of  hemolysis or increased red cell regeneration.    COMMENT:  Clinical correlation is required determine the significance of the   borderline and isolated findings.    Electronically signed out by:    HUSAM Hernandez M.D.    CLINICAL HISTORY:  54-year-old male.    PERIPHERAL BLOOD DATA:  PERIPHERAL BLOOD DATA (Date: 12/2/19)  See Epic imported clinical laboratory data at the end  of this report which   includes peripheral blood indices.    PERIPHERAL BLOOD DIFFERENTIAL:    The automated differential associated with  the CBC for this case was   imported from [facility] and was confirmed  accurate with a 200 cell count manual differential review of the smear as   noted below. When the manual  differential and automated differential concur, the automated differential   is favored for accuracy as it  includes evaluation of many more cells. See imported CBC with differential   at the end of this report including  the normal range for gender and age.    (Reference ranges >18 year old)    Percent  55.5%  Neutrophils (Automated count = 55.4%)  32.0%  Lymphocytes (Automated count = 29.8%)  9.0%   Monocytes (Automated count = 13.4%)  2.0%   Eosinophils (Automated count = 0.9%)  1.5%   Basophils (Automated count = 0.5%)    Absolute  4.55  Neutrophils (Automated count = 4.54)    (Ref normal 1.6 - 8.3 x   10*9/L)  2.62  Lymphocytes (Automated count = 2.44   (Ref normal 0.8 - 5.3 x   10*9/L)  0.74  Monocytes (Automated count = 1.10)     (Ref normal 0 -1.3 x 10*9/L)  0.16  Eosinophils (Automated count = 0.07)    (Ref  normal 0 - 0.7 x   10*9/L)  0.12  Basophils (Automated count = 0.04)      (Ref normal 0 - 0.2 x   10*9/L)    PERIPHERAL BLOOD MORPHOLOGY:    ERYTHROCYTES:  The red cells are normocytic, normochromic and normal in   number for the patient's age and  gender. No significant anisopoikilocytosis is seen. No features of   hemolysis or increased polychromasia are  identified.  No parasites are identified.    LEUKOCYTES:  The leukocytes are normal in number, morphology and   differential distribution. No immature  precursors or evidence of neutrophilic dysplasia is seen. No atypical   lymphoid cells are seen. No parasites or  parasitic inclusions are seen.    PLATELETS:  The platelets are normal in number and morphology. (Dictated   by: MIGUEL Hernandez MD 12/05/2019)    CLINICAL LAB RESULTS:  [facility]  Order No. Lab Test Code Clinical Result Ref. Range Units Result   Date  Hemogram/Diff/PLT [id number] BK WBC Count 8.2 4.0-11.0 10e9/L 12/2/2019 16:15       RBC Count 4.43 4.4-5.9 10e12/L 12/2/2019 16 :15       Hemoglobin L 13.0 13.3-17.7 g/dL 12/2/2019 16:15       Hematocrit L 39.4 40.0-53.0 % 12/2/2019 16:15       MCV 89  fl 12/2/2019 16:15       MCH 29.3 26.5-33.0 pg 12/2/2019 16:15       MCHC 33.0 31.5-36.5 g/dL 12/2/2019 16:15       RDW 13.9 10.0-15.0 % 12/2/2019 16:15       Platelet Count 299 150-450 10e9/L 12/2/2019 16:15       % Neutrophils 55.4  % 12/2/2019 16:16       % Lymphocytes 29.8  % 12/2/2019 16:16       % Monocytes 13.4  % 12/2/2019 16:16       % Eosinophils 0.9  % 12/2/2019 16:16       % Basophils 0.5  % 12/2/2019 16:16       abs Neutrophils 4.5 1.6-8.3 10e9/L 12/2/2019 16:16       abs Lymphocytes 2.4 0.8-5.3 10e9/L 12/2/2019 16:16       abs Monocytes 1.1 0.0-1.3 10e9/L 12/2/2019 16:16       abs Eosinophils 0.1 0.0-0.7 10e9/L 12/2/2019 16:16       abs Basophils 0.0 0.0-0.2 10e9/L 12/2/2019 16:16        SEE TEXT   12/2/2019 16:16       Text/Comments:  Automated Method    Iron Binding Panel  MG Iron 41  ug/dL 12/2/2019 20:28       Iron Binding Cap 295 24 0-430 ug/dL 12/2/2019 20:30       Iron Sat Index L 14 15-46 % 12/2/2019 20:30    Ferritin   Ferritin 128  ng/mL 12/2/2019 20:32    HGB Evaluation  BK Hemoglobin A1 96.4 95.0-97.9 % 12/5/2019 18:51       Hemoglobin A2 3.0 2.0-3.5 % 12/5/2019 18:51       Hemoglobin F 0.6  (Note) 0.0-2.1 % 12/5/2019 18:51         Text/Comments:  REFERENCE INTERVAL: Hemoglobin F  Access complete set of age- and/or gender-specific  reference intervals for this test in the [facility]  Test Directory ([email or web address]).       Hemoglobin S 0.0 0.0-0.0 % 12/5/2019 18:51       Hemoglobin C 0.0 0.0-0.0 % 12/5/2019 18:51       Hemoglobin E 0.0 0.0-0.0 % 12/5/2019 18:51       Hemoglobin Other 0.0 0.0-0.0 % 12/5/2019 18:51       HGB Abn Evaluation SEE TEXT    12/5/2019 18:51       Text/Comments:  SEE NOTE  (Note)         Impression: Normal HPLC evaluation.    This normal HPLC result does not rule out the possibility  of alpha globin gene deletions associated with silent  carrier status or alpha thalassemia gini t. Individuals who  carry a rare, Greek beta thalassemia variant often have a  normal Hb A2 and may not be identified by this assay.  Please correlate with clinical and laboratory findings.       Sickle Cell Soluble Not Performed   12/5/2019 18:51       HGB Capillary ELP SEE TEXT   12/5/2019 18:51       Text/Comments:  Not Performed  (Note)         Performed by [facility],  [address] [place] [address] [phone]  [email or web address] Bryan Stevenson MD, Lab. Director    Retic  MG Retic % 1.5 0.5-2.0 % 12/2/2019 18:45       Retic abs 71.1 25-95 10e9/L 12/2/2019 18:45    Sol Transferrin Recep  BK Sol Transferrin Recep 3.1  (Note) 2.2-5.0 mg/L 12/4/2019 18:58         Text/Comments:  INTERPRETIVE INFORMATION: Soluble Transferrin Receptor  People of  descent and those residing at 5200 feet  (1600 meters) above sea level were found to have a 6%  higher normal value. These differences were additive.  Reference intervals have not been established for pregnant  females, patients un mervin 18 years of age, and recent or  frequent blood donors.  Serum soluble transferrin receptor increases in iron  deficiency and is usually unaffected by chronic disease  states. In general, to increase sensitivity and  specificity, the measurement of serum soluble transferrin  receptor should be performed in combination with other  tests of iron status, including ferritin, TIBC, and serum  iron.  (See Table Below).            Tests for  Iron     Anemia of    Combined Iron            Changes    Def.     Chronic      Def. and anemia  Analyte   in:        Anemia   Disease      of Chronic Dz  -------   --------   ------   ---------    ---------------  Ferritin  Fe Stores  Low      High          Normal or High  TIBC      Fe Status  High     Low          Normal or High  Serum Fe  Fe Status  Low      Low          Low  sTfR      Fe Status  High     Normal       High  Performed by [facility],  [address] [place] [address],[place] [address] [phone]  [email or web address], Bryan Stevenson MD, Lab. Director     Vitamin B12  BU Vitamin B12 494 193-986 pg/mL 12/2/2019 21:40    TSH, Reflex [id number] MG TSH, Reflex 1.85 0.40-4.00 mU/L 12/2/2019 20:27    Creatinine [id number] MG Creatinine 0.89 0.66-1.25 mg/dL 11/26/2019 19:23       GFR, Estimated SEE TEXT >60 mL/min/{1.73_m2} 11/26/2019 19:23       Text/Comments:  >90 Non  GFR Calc Starting 12/18/2018, serum creatinine   based  estimated GFR (eGFR) will be calculated using the Chronic Kidney Disease  Epidemiology Collaboration (CKD-EPI) equation.       GFR, Est., if Black SEE TEXT >60 mL/min/{1.73_m2} 11/26/2019 19:23       Text/Comments:  >90  GFR Calc Starting 12/18/2018, serum creatinine based  estimated GFR (eGFR) will be calculated using the Chronic Kidney Disease  Epidemiology Collaboration (CKD-EPI) equation.    The technical component of this testing was completed at the [facility], with the professional component performed   at the [facility], [address] [place] [phone] ([phone])    CPT Codes:  A: 09535-OIAO    COLLECTION SITE:  Client:  [facility]  Location:  BK (B)       CBC with platelets differential   Result Value Ref Range    WBC 8.2 4.0 - 11.0 10e9/L    RBC Count 4.43 4.4 - 5.9 10e12/L    Hemoglobin 13.0 (L) 13.3 - 17.7 g/dL    Hematocrit 39.4 (L) 40.0 - 53.0 %    MCV 89 78 - 100 fl    MCH 29.3 26.5 - 33.0 pg    MCHC 33.0 31.5 - 36.5 g/dL    RDW 13.9 10.0 - 15.0 %    Platelet Count 299 150 - 450 10e9/L    % Neutrophils 55.4 %    % Lymphocytes 29.8 %    %  Monocytes 13.4 %    % Eosinophils 0.9 %    % Basophils 0.5 %    Absolute Neutrophil 4.5 1.6 - 8.3 10e9/L    Absolute Lymphocytes 2.4 0.8 - 5.3 10e9/L    Absolute Monocytes 1.1 0.0 - 1.3 10e9/L    Absolute Eosinophils 0.1 0.0 - 0.7 10e9/L    Absolute Basophils 0.0 0.0 - 0.2 10e9/L    Diff Method Automated Method    Reticulocyte Count   Result Value Ref Range    % Retic 1.5 0.5 - 2.0 %    Absolute Retic 71.1 25 - 95 10e9/L   Vitamin B12   Result Value Ref Range    Vitamin B12 494 193 - 986 pg/mL   Folate   Result Value Ref Range    Folate 9.9 >5.4 ng/mL   Soluble transferrin receptor   Result Value Ref Range    Soluble Transferrin Receptor 3.1 2.2 - 5.0 mg/L      Comment:      (Note)  INTERPRETIVE INFORMATION: Soluble Transferrin Receptor  People of  descent and those residing at 5200 feet   (1600 meters) above sea level were found to have a 6%   higher normal value. These differences were additive.   Reference intervals have not been established for pregnant   females, patients under 18 years of age, and recent or   frequent blood donors.  Serum soluble transferrin receptor increases in iron   deficiency and is usually unaffected by chronic disease   states. In general, to increase sensitivity and   specificity, the measurement of serum soluble transferrin   receptor should be performed in combination with other   tests of iron status, including ferritin, TIBC, and serum   iron.  (See Table Below).           Tests for  Iron     Anemia of    Combined Iron           Changes    Def.     Chronic      Def. and anemia  Analyte   in:        Anemia   Disease      of Chronic Dz  -------   --------   ------   ---------    ---------------  Ferritin  Fe Stores    Low      High         Normal or High  TIBC      Fe Status  High     Low          Normal or High  Serum Fe  Fe Status  Low      Low          Low  sTfR      Fe Status  High     Normal       High  Performed by [facility],  [address] [place] [id number]  [phone]  [email or web address], Bryan Stevenson MD, Lab. Director

## 2019-12-04 LAB — STFR SERPL-SCNC: 3.1 MG/L (ref 2.2–5)

## 2019-12-05 LAB
COPATH REPORT: NORMAL
HGB A1 MFR BLD: 96.4 % (ref 95–97.9)
HGB A2 MFR BLD: 3 % (ref 2–3.5)
HGB C MFR BLD: 0 % (ref 0–0)
HGB E MFR BLD: 0 % (ref 0–0)
HGB F MFR BLD: 0.6 % (ref 0–2.1)
HGB FRACT BLD ELPH-IMP: NORMAL
HGB OTHER MFR BLD: 0 % (ref 0–0)
HGB S BLD QL SOLY: NORMAL
HGB S MFR BLD: 0 % (ref 0–0)
PATH INTERP BLD-IMP: NORMAL

## 2019-12-14 ENCOUNTER — NURSE TRIAGE (OUTPATIENT)
Dept: NURSING | Facility: CLINIC | Age: 54
End: 2019-12-14

## 2019-12-14 ENCOUNTER — TELEPHONE (OUTPATIENT)
Dept: NURSING | Facility: CLINIC | Age: 54
End: 2019-12-14

## 2019-12-14 NOTE — TELEPHONE ENCOUNTER
Patient calling to ask about hemoglobin lab result from 12/2/2019. FNA explained it was low at 12.6 but not dangerously low. Patient denies extreme fatigue, active bleeding or other symptoms. Carloz would like a call back at [phone] to discuss this result and plans going forward.   Pt voiced understand and will follow disposition.   Danuta Rasmussen, CARLY  [facility] Nurse Advisor      Reason for Disposition    General information question, no triage required and triager able to answer question    Additional Information    Negative: [1] Caller is not with the adult (patient) AND [2] reporting urgent symptoms    Negative: Lab result questions    Negative: Medication questions    Negative: Caller can't be reached by phone    Negative: Caller has already spoken to PCP or another triager    Negative: RN needs further essential information from caller in order to complete triage    Negative: Requesting regular office appointment    Negative: [1] Caller requesting NON-URGENT health information AND [2] PCP's office is the best resource    Negative: Health Information question, no triage required and triager able to answer question    Protocols used: INFORMATION ONLY CALL-A-

## 2019-12-14 NOTE — TELEPHONE ENCOUNTER
Patient calling to ask about hemoglobin lab result from 12/2/2019. FNA explained it was low at 12.6 but not dangerously low. Patient denies extreme fatigue, active bleeding or other symptoms. Carloz would like a call back at [phone] to discuss this result and plans going forward.     Danuta Rasmussen, RN  [facility] Nurse Advisor

## 2020-01-28 DIAGNOSIS — E78.5 HYPERLIPIDEMIA LDL GOAL <130: ICD-10-CM

## 2020-01-28 DIAGNOSIS — I10 ESSENTIAL HYPERTENSION WITH GOAL BLOOD PRESSURE LESS THAN 140/90: ICD-10-CM

## 2020-01-28 RX ORDER — LISINOPRIL 20 MG/1
20 TABLET ORAL DAILY
Qty: 90 TABLET | Refills: 0 | Status: SHIPPED | OUTPATIENT
Start: 2020-01-28 | End: 2020-02-21

## 2020-01-28 RX ORDER — SIMVASTATIN 40 MG
40 TABLET ORAL EVERY EVENING
Qty: 90 TABLET | Refills: 0 | Status: SHIPPED | OUTPATIENT
Start: 2020-01-28 | End: 2020-02-21

## 2020-01-28 RX ORDER — CHLORTHALIDONE 25 MG/1
25 TABLET ORAL DAILY
Qty: 90 TABLET | Refills: 0 | Status: SHIPPED | OUTPATIENT
Start: 2020-01-28 | End: 2020-02-21

## 2020-01-28 NOTE — TELEPHONE ENCOUNTER
Prescription approved per [facility] Refill Protocol.      Shannan Key RN, BSN, PHN

## 2020-01-28 NOTE — TELEPHONE ENCOUNTER
"Requested Prescriptions   Pending Prescriptions Disp Refills     simvastatin (ZOCOR) 40 MG tablet  Last Written Prescription Date:  11/26/19  Last Fill Quantity: 30,  # refills: 5   Last Office Visit with [facility] or [facility] prescribing provider:  12/02/19-Barroso   Future Office Visit:    30 tablet 5     Sig: Take 1 tablet (40 mg) by mouth every evening for cholesterol.       Statins Protocol Passed - 1/28/2020  8:23 AM        Passed - LDL on file in past 12 months     Recent Labs   Lab Test 11/26/19  1103   *             Passed - No abnormal creatine kinase in past 12 months     No lab results found.             Passed - Recent (12 mo) or future (30 days) visit within the authorizing provider's specialty     Patient has had an office visit with the authorizing provider or a provider within the authorizing providers department within the previous 12 mos or has a future within next 30 days. See \"Patient Info\" tab in inbasket, or \"Choose Columns\" in Meds & Orders section of the refill encounter.              Passed - Medication is active on med list        Passed - Patient is age 18 or older        lisinopril (PRINIVIL/ZESTRIL) 20 MG tablet  Last Written Prescription Date:  11/26/19  Last Fill Quantity: 30,  # refills: 5   Last Office Visit with [facility], [facility] or [facility] prescribing provider:  12/02/19-Barroso   Future Office Visit:    30 tablet 5     Sig: Take 1 tablet (20 mg) by mouth daily for blood pressure.       ACE Inhibitors (Including Combos) Protocol Passed - 1/28/2020  8:23 AM        Passed - Blood pressure under 140/90 in past 12 months     BP Readings from Last 3 Encounters:   12/02/19 110/67   11/26/19 121/76   02/26/19 129/77                 Passed - Recent (12 mo) or future (30 days) visit within the authorizing provider's specialty     Patient has had an office visit with the authorizing provider or a provider within the authorizing providers department within the previous 12 mos or has a future " "within next 30 days. See \"Patient Info\" tab in inbasket, or \"Choose Columns\" in Meds & Orders section of the refill encounter.              Passed - Medication is active on med list        Passed - Patient is age 18 or older        Passed - Normal serum creatinine on file in past 12 months     Recent Labs   Lab Test 11/26/19  1103   CR 0.89             Passed - Normal serum potassium on file in past 12 months     Recent Labs   Lab Test 11/26/19  1103   POTASSIUM 4.4             chlorthalidone (HYGROTON) 25 MG tablet  Last Written Prescription Date:  11/26/19  Last Fill Quantity: 30,  # refills: 5   Last Office Visit with [facility], [facility] or [facility] prescribing provider:  12/02/19Mely   Future Office Visit:    30 tablet 5     Sig: Take 1 tablet (25 mg) by mouth daily for blood pressure.       Diuretics (Including Combos) Protocol Passed - 1/28/2020  8:23 AM        Passed - Blood pressure under 140/90 in past 12 months     BP Readings from Last 3 Encounters:   12/02/19 110/67   11/26/19 121/76   02/26/19 129/77                 Passed - Recent (12 mo) or future (30 days) visit within the authorizing provider's specialty     Patient has had an office visit with the authorizing provider or a provider within the authorizing providers department within the previous 12 mos or has a future within next 30 days. See \"Patient Info\" tab in inbasket, or \"Choose Columns\" in Meds & Orders section of the refill encounter.              Passed - Medication is active on med list        Passed - Patient is age 18 or older        Passed - Normal serum creatinine on file in past 12 months     Recent Labs   Lab Test 11/26/19  1103   CR 0.89              Passed - Normal serum potassium on file in past 12 months     Recent Labs   Lab Test 11/26/19  1103   POTASSIUM 4.4                    Passed - Normal serum sodium on file in past 12 months     Recent Labs   Lab Test 02/26/19  1733                   "

## 2020-01-28 NOTE — TELEPHONE ENCOUNTER
Reason for Call:  Medication or medication refill:    Do you use a [facility]?  Name of the pharmacy and phone number for the current request:  [facility] - [phone]    Name of the medication requested: chlorthalidone (HYGROTON) 25 MG tablet, lisinopril (PRINIVIL/ZESTRIL) 20 MG tablet, simvastatin (ZOCOR) 40 MG tablet    Other request: Pt calling to request three month supply of all of these three medications for Pt is leaving out of town after this week and would like for them sent to the Pharmacy as soon as possible this week  He would like a call back to confirm Rx's have been sent.    Can we leave a detailed message on this number? YES    Phone number patient can be reached at: Cell number on file:    Telephone Information:   Mobile [phone]       Best Time: anytime    Call taken on 1/28/2020 at 7:47 AM by Basim Laughlin

## 2020-02-20 ENCOUNTER — TELEPHONE (OUTPATIENT)
Dept: FAMILY MEDICINE | Facility: CLINIC | Age: 55
End: 2020-02-20

## 2020-02-20 DIAGNOSIS — I10 ESSENTIAL HYPERTENSION WITH GOAL BLOOD PRESSURE LESS THAN 140/90: ICD-10-CM

## 2020-02-20 DIAGNOSIS — E78.5 HYPERLIPIDEMIA LDL GOAL <130: ICD-10-CM

## 2020-02-20 DIAGNOSIS — N52.9 ERECTILE DYSFUNCTION, UNSPECIFIED ERECTILE DYSFUNCTION TYPE: ICD-10-CM

## 2020-02-20 DIAGNOSIS — N52.8 OTHER MALE ERECTILE DYSFUNCTION: Primary | ICD-10-CM

## 2020-02-20 NOTE — TELEPHONE ENCOUNTER
Routing refill request to provider for review/approval because:  Unsure what a 90 day supply would be sent to provider to address.    Tania Calero RN, [facility] Triage

## 2020-02-20 NOTE — TELEPHONE ENCOUNTER
"Requested Prescriptions   Pending Prescriptions Disp Refills     CHLORTHALIDONE 25 MG PO tablet [Pharmacy Med Name: Chlorthalidone Oral Tablet 25 MG] 90 tablet 0     Sig: TAKE ONE TABLET BY MOUTH ONE TIME DAILY for blood pressure.       Diuretics (Including Combos) Protocol Passed - 2/20/2020  9:41 AM        Passed - Blood pressure under 140/90 in past 12 months     BP Readings from Last 3 Encounters:   12/02/19 110/67   11/26/19 121/76   02/26/19 129/77                 Passed - Recent (12 mo) or future (30 days) visit within the authorizing provider's specialty     Patient has had an office visit with the authorizing provider or a provider within the authorizing providers department within the previous 12 mos or has a future within next 30 days. See \"Patient Info\" tab in inbasket, or \"Choose Columns\" in Meds & Orders section of the refill encounter.              Passed - Medication is active on med list        Passed - Patient is age 18 or older        Passed - Normal serum creatinine on file in past 12 months     Recent Labs   Lab Test 11/26/19  1103   CR 0.89              Passed - Normal serum potassium on file in past 12 months     Recent Labs   Lab Test 11/26/19  1103   POTASSIUM 4.4                    Passed - Normal serum sodium on file in past 12 months     Recent Labs   Lab Test 02/26/19  1733                 SIMVASTATIN 40 MG PO tablet [Pharmacy Med Name: Simvastatin Oral Tablet 40 MG] 90 tablet 0     Sig: TAKE ONE TABLET BY MOUTH IN THE EVENING for cholesterol       Statins Protocol Passed - 2/20/2020  9:41 AM        Passed - LDL on file in past 12 months     Recent Labs   Lab Test 11/26/19  1103   *             Passed - No abnormal creatine kinase in past 12 months     No lab results found.             Passed - Recent (12 mo) or future (30 days) visit within the authorizing provider's specialty     Patient has had an office visit with the authorizing provider or a provider within the " "authorizing providers department within the previous 12 mos or has a future within next 30 days. See \"Patient Info\" tab in inbasket, or \"Choose Columns\" in Meds & Orders section of the refill encounter.              Passed - Medication is active on med list        Passed - Patient is age 18 or older        LISINOPRIL 20 MG PO tablet [Pharmacy Med Name: Lisinopril Oral Tablet 20 MG] 90 tablet 0     Sig: TAKE ONE TABLET BY MOUTH ONE TIME DAILY for blood pressure       ACE Inhibitors (Including Combos) Protocol Passed - 2/20/2020  9:41 AM        Passed - Blood pressure under 140/90 in past 12 months     BP Readings from Last 3 Encounters:   12/02/19 110/67   11/26/19 121/76   02/26/19 129/77                 Passed - Recent (12 mo) or future (30 days) visit within the authorizing provider's specialty     Patient has had an office visit with the authorizing provider or a provider within the authorizing providers department within the previous 12 mos or has a future within next 30 days. See \"Patient Info\" tab in in[place], or \"Choose Columns\" in Meds & Orders section of the refill encounter.              Passed - Medication is active on med list        Passed - Patient is age 18 or older        Passed - Normal serum creatinine on file in past 12 months     Recent Labs   Lab Test 11/26/19  1103   CR 0.89             Passed - Normal serum potassium on file in past 12 months     Recent Labs   Lab Test 11/26/19  1103   POTASSIUM 4.4             Last Written Prescription Date:  1/28/20  Last Fill Quantity: 90,  # refills: 0   Last office visit: 12/2/2019 with prescribing provider:  Maurice Barroso     Future Office Visit:      "

## 2020-02-20 NOTE — TELEPHONE ENCOUNTER
Reason for Call:  Other prescription    Detailed comments: Patient would like to get a 90 day supply for his Viagra medication please follow up with patient to advise.    Phone Number Patient can be reached at: Home number on file [phone] (home)    Best Time: ANY    Can we leave a detailed message on this number? YES    Call taken on 2/20/2020 at 9:40 AM by Singh Hernández

## 2020-02-21 RX ORDER — SILDENAFIL 50 MG/1
50 TABLET, FILM COATED ORAL DAILY PRN
Qty: 30 TABLET | Refills: 0 | Status: SHIPPED | OUTPATIENT
Start: 2020-02-21 | End: 2020-02-21

## 2020-02-21 RX ORDER — SILDENAFIL 50 MG/1
50 TABLET, FILM COATED ORAL DAILY PRN
Qty: 60 TABLET | Refills: 0 | Status: SHIPPED | OUTPATIENT
Start: 2020-02-21 | End: 2020-03-17

## 2020-02-21 RX ORDER — LISINOPRIL 20 MG/1
TABLET ORAL
Qty: 90 TABLET | Refills: 0 | Status: SHIPPED | OUTPATIENT
Start: 2020-02-21 | End: 2020-04-20

## 2020-02-21 RX ORDER — SIMVASTATIN 40 MG
TABLET ORAL
Qty: 90 TABLET | Refills: 0 | Status: SHIPPED | OUTPATIENT
Start: 2020-02-21 | End: 2020-04-20

## 2020-02-21 RX ORDER — CHLORTHALIDONE 25 MG/1
TABLET ORAL
Qty: 30 TABLET | Refills: 0 | Status: SHIPPED | OUTPATIENT
Start: 2020-02-21 | End: 2020-04-20

## 2020-02-21 NOTE — TELEPHONE ENCOUNTER
Prescription approved per [facility] Refill Protocol.    Team please contact patient and assist with scheduling apt. Sent 30 day supply of chlorthalidone (HYGROTON) 25 MG tablet due for labs this month.

## 2020-02-21 NOTE — TELEPHONE ENCOUNTER
Patient is calling again to see if the rx for Viagra is going to be filled, as he needs to travel out of town.      Please contact patient and advise

## 2020-02-21 NOTE — TELEPHONE ENCOUNTER
Refill request for Sildenafil (Revatio) 20 mg, with 90 day supply denied.    Inform patient that I recommend Sildenafil (Viagra) 50 mg tablets.  Rx with 30 tablets sent.

## 2020-02-21 NOTE — TELEPHONE ENCOUNTER
Called patient's other phone number on file. Patient states the phone listed below is the old cell and doesn't work anymore. Cell updated in chart.    Patient states insurance covers more than 30 days, so he is requesting 90 days and if insurance doesn't accept the 90 days, he's okay with having pharmacy dispense 60 pills. He states this was discussed at last office visit and Dr Dean did not send 90 pills in case it was not covered by insurance but he knows insurance will cover more than 30 pills.    Alexi Wilder CMA

## 2020-02-24 NOTE — TELEPHONE ENCOUNTER
Called and spoke to the patient and gave him the RN message and offered to schedule the appt. Patient declined and states he will call back to schedule.  [facility]  2nd Floor  Primary Care

## 2020-03-16 DIAGNOSIS — N52.8 OTHER MALE ERECTILE DYSFUNCTION: ICD-10-CM

## 2020-03-16 NOTE — TELEPHONE ENCOUNTER
"Requested Prescriptions   Pending Prescriptions Disp Refills     sildenafil (VIAGRA) 50 MG tablet [Pharmacy Med Name: Sildenafil Citrate Oral Tablet 50 MG] 60 tablet 0     Sig: Take 1 tablet by mouth once daily as needed       Erectile Dysfuction Protocol Passed - 3/16/2020 10:16 AM        Passed - Absence of nitrates on medication list        Passed - Absence of Alpha Blockers on Med list        Passed - Recent (12 mo) or future (30 days) visit within the authorizing provider's specialty     Patient has had an office visit with the authorizing provider or a provider within the authorizing providers department within the previous 12 mos or has a future within next 30 days. See \"Patient Info\" tab in inbasket, or \"Choose Columns\" in Meds & Orders section of the refill encounter.              Passed - Medication is active on med list        Passed - Patient is age 18 or older         Last Written Prescription Date:  2/21/2020  Last Fill Quantity: 60,  # refills: 0   Last office visit: 12/2/2019 with prescribing provider:  Dharmesh  Future Office Visit:        "

## 2020-03-17 RX ORDER — SILDENAFIL 50 MG/1
TABLET, FILM COATED ORAL
Qty: 60 TABLET | Refills: 0 | Status: SHIPPED | OUTPATIENT
Start: 2020-03-17 | End: 2020-04-14

## 2020-03-17 NOTE — TELEPHONE ENCOUNTER
Prescription approved per [facility] Refill Protocol.  Meghann Che RN

## 2020-03-18 DIAGNOSIS — N52.8 OTHER MALE ERECTILE DYSFUNCTION: ICD-10-CM

## 2020-03-18 NOTE — TELEPHONE ENCOUNTER
Rx sent to [facility]:    sildenafil (VIAGRA) 50 MG tablet  60 tablet  0  3/17/2020

## 2020-03-19 RX ORDER — SILDENAFIL 50 MG/1
TABLET, FILM COATED ORAL
Qty: 60 TABLET | Refills: 0 | OUTPATIENT
Start: 2020-03-19

## 2020-03-19 NOTE — TELEPHONE ENCOUNTER
Duplicate request.  Medication refilled on 3/17/20, #60 tabs given. See current med list for details.    Shannan Crisostomo RN  [facility]

## 2020-04-13 DIAGNOSIS — N52.8 OTHER MALE ERECTILE DYSFUNCTION: ICD-10-CM

## 2020-04-13 NOTE — TELEPHONE ENCOUNTER
Reason for call:  Medication   If this is a refill request, has the caller requested the refill from the pharmacy already? No  Will the patient be using a [facility]? No  Name of the pharmacy and phone number for the current request: [facility] [phone]    Name of the medication requested: viagra    Other request: call if questions- patient asking for 100mg    Phone number to reach patient:  Home number on file [phone] (home)    Best Time:  any    Can we leave a detailed message on this number?  YES    Travel screening: Not Applicable

## 2020-04-13 NOTE — TELEPHONE ENCOUNTER
"Requested Prescriptions   Pending Prescriptions Disp Refills     sildenafil (VIAGRA) 50 MG tablet [Pharmacy Med Name: Sildenafil Citrate Oral Tablet 50 MG]  Last Written Prescription Date:  03/17/2020  Last Fill Quantity: 60,  # refills: 0   Last Office Visit with FMGISSELLE, NICOLETTE or [facility] prescribing provider:  12/02/19Mely   Future Office Visit:    60 tablet 0     Sig: Take 1 tablet by mouth once daily as needed       Erectile Dysfuction Protocol Passed - 4/13/2020 11:20 AM        Passed - Absence of nitrates on medication list        Passed - Absence of Alpha Blockers on Med list        Passed - Recent (12 mo) or future (30 days) visit within the authorizing provider's specialty     Patient has had an office visit with the authorizing provider or a provider within the authorizing providers department within the previous 12 mos or has a future within next 30 days. See \"Patient Info\" tab in inbasket, or \"Choose Columns\" in Meds & Orders section of the refill encounter.              Passed - Medication is active on med list        Passed - Patient is age 18 or older             "

## 2020-04-14 RX ORDER — SILDENAFIL 50 MG/1
TABLET, FILM COATED ORAL
Qty: 60 TABLET | Refills: 0 | Status: SHIPPED | OUTPATIENT
Start: 2020-04-14 | End: 2020-04-20

## 2020-04-14 NOTE — TELEPHONE ENCOUNTER
Patient asking for 100 mg tablet dose, with refills. (dose on active med list is 50 mg tablets)    Routing to provider to review and advise.     Liane Lomeli RN  [facility]

## 2020-04-15 NOTE — TELEPHONE ENCOUNTER
Patient informed by phone to schedule telephone visit with provider to discuss. Patient declined and said that he will call back tomorrow to schedule appointment, need to find out when he will be available to do phone visit.    Daylin Daniel MA

## 2020-04-15 NOTE — TELEPHONE ENCOUNTER
.Reason for Call:   checking on prescription    Detailed comments: Patient is asking for a call back/checking on medication refill; anthony  *aware provider has not reviewed/pending on approval  Phone Number Patient can be reached at: Home number on file [phone] (home)    Best Time: anytime    Can we leave a detailed message on this number? YES    Call taken on 4/15/2020 at 7:23 AM by Becka Brooks

## 2020-04-15 NOTE — TELEPHONE ENCOUNTER
TC/MA to call as patient will need an appointment for an increase in dose of medication.    Tania Calero RN, [facility] Triage

## 2020-04-20 ENCOUNTER — VIRTUAL VISIT (OUTPATIENT)
Dept: FAMILY MEDICINE | Facility: CLINIC | Age: 55
End: 2020-04-20

## 2020-04-20 DIAGNOSIS — N52.8 OTHER MALE ERECTILE DYSFUNCTION: ICD-10-CM

## 2020-04-20 DIAGNOSIS — E78.5 HYPERLIPIDEMIA LDL GOAL <130: ICD-10-CM

## 2020-04-20 DIAGNOSIS — R73.03 PREDIABETES: ICD-10-CM

## 2020-04-20 DIAGNOSIS — I10 HYPERTENSION GOAL BP (BLOOD PRESSURE) < 130/80: Primary | ICD-10-CM

## 2020-04-20 PROBLEM — G51.0 BELL'S PALSY: Status: RESOLVED | Noted: 2017-05-11 | Resolved: 2020-04-20

## 2020-04-20 PROCEDURE — 99214 OFFICE O/P EST MOD 30 MIN: CPT | Mod: 95 | Performed by: INTERNAL MEDICINE

## 2020-04-20 RX ORDER — SIMVASTATIN 40 MG
TABLET ORAL
Qty: 90 TABLET | Refills: 1 | Status: SHIPPED | OUTPATIENT
Start: 2020-04-20 | End: 2020-08-03

## 2020-04-20 RX ORDER — SILDENAFIL 100 MG/1
100 TABLET, FILM COATED ORAL DAILY PRN
Qty: 90 TABLET | Refills: 0 | Status: SHIPPED | OUTPATIENT
Start: 2020-04-20 | End: 2020-08-03

## 2020-04-20 RX ORDER — METFORMIN HCL 500 MG
500 TABLET, EXTENDED RELEASE 24 HR ORAL
Qty: 90 TABLET | Refills: 1 | Status: CANCELLED | OUTPATIENT
Start: 2020-04-20

## 2020-04-20 RX ORDER — CHLORTHALIDONE 25 MG/1
TABLET ORAL
Qty: 90 TABLET | Refills: 1 | Status: SHIPPED | OUTPATIENT
Start: 2020-04-20 | End: 2020-08-03

## 2020-04-20 RX ORDER — SIMVASTATIN 40 MG
TABLET ORAL
Qty: 90 TABLET | Refills: 0 | Status: CANCELLED | OUTPATIENT
Start: 2020-04-20

## 2020-04-20 RX ORDER — LISINOPRIL 20 MG/1
TABLET ORAL
Qty: 90 TABLET | Refills: 1 | Status: SHIPPED | OUTPATIENT
Start: 2020-04-20 | End: 2020-08-03

## 2020-04-20 NOTE — PROGRESS NOTES
"Jose Alejandro Deras is a 54 year old male who is being evaluated via a billable telephone visit.      The patient has been notified of following:     \"This telephone visit will be conducted via a call between you and your physician/provider. We have found that certain health care needs can be provided without the need for a physical exam.  This service lets us provide the care you need with a short phone conversation.  If a prescription is necessary we can send it directly to your pharmacy.  If lab work is needed we can place an order for that and you can then stop by our lab to have the test done at a later time.    Telephone visits are billed at different rates depending on your insurance coverage. During this emergency period, for some insurers they may be billed the same as an in-person visit.  Please reach out to your insurance provider with any questions.    If during the course of the call the physician/provider feels a telephone visit is not appropriate, you will not be charged for this service.\"    Patient has given verbal consent for Telephone visit?  Yes    How would you like to obtain your AVS? Mail a copy    Subjective     Jose Alejandro Deras is a 54 year old male who presents to clinic today for the following health issues:         Diabetes Follow-up      How often are you checking your blood sugar? Not at all    What concerns do you have today about your diabetes? None     Do you have any of these symptoms? (Select all that apply)  No numbness or tingling in feet.  No redness, sores or blisters on feet.  No complaints of excessive thirst.  No reports of blurry vision.  No significant changes to weight.    Have you had a diabetic eye exam in the last 12 months? No        BP Readings from Last 2 Encounters:   12/02/19 110/67   11/26/19 121/76     Hemoglobin A1C (%)   Date Value   11/26/2019 5.8 (H)   08/14/2017 5.7     LDL Cholesterol Calculated (mg/dL)   Date Value   11/26/2019 113 (H)   02/26/2019 88 "         Hyperlipidemia Follow-Up      Are you regularly taking any medication or supplement to lower your cholesterol?   Yes- Simvastatin.    Are you having muscle aches or other side effects that you think could be caused by your cholesterol lowering medication?  No      Patient Active Problem List   Diagnosis     Hyperlipidemia LDL goal <130     Hypertension goal BP (blood pressure) < 130/80     Erectile dysfunction     Hypotestosteronism     Vitamin D deficiency     Obesity, Class I, BMI 30-34.9     Impingement syndrome, shoulder     Rotator cuff tendinitis     Prediabetes     Past Surgical History:   Procedure Laterality Date     NO HISTORY OF SURGERY         Social History     Tobacco Use     Smoking status: Never Smoker     Smokeless tobacco: Never Used   Substance Use Topics     Alcohol use: Yes     Alcohol/week: 0.0 standard drinks     Comment: occasional      Family History   Problem Relation Age of Onset     Hypertension Mother      Diabetes Mother      C.A.D. No family hx of      Cerebrovascular Disease No family hx of      Cancer No family hx of      Glaucoma No family hx of      Macular Degeneration No family hx of      Retinal detachment No family hx of          No Known Allergies  Recent Labs   Lab Test 12/02/19  1450 11/26/19  1103 02/26/19  1733 01/20/18  1111  08/14/17  1540  04/20/16  0922  04/12/12  1856   A1C  --  5.8*  --   --   --  5.7  --  6.0   < > 6.4*   LDL  --  113* 88 101*   < > 65   < > 100*   < >  --    HDL  --  54 44 51   < > 44   < > 40   < >  --    TRIG  --  99 102 100   < > 349*   < > 136   < >  --    ALT  --  24 24 40  --  28   < > 25   < >  --    CR  --  0.89 0.98 0.91  --  0.95   < > 1.00   < >  --    GFRESTIMATED  --  >90 86 86  --  82   < > 78   < >  --    GFRESTBLACK  --  >90 >90 >90  --  >90  African American GFR Calc     < > >90   GFR Calc     < >  --    POTASSIUM  --  4.4 3.8 4.1  --  4.0   < > 4.1   < >  --    TSH 1.85  --   --   --   --   --   --   --    --  1.29    < > = values in this interval not displayed.      BP Readings from Last 3 Encounters:   12/02/19 110/67   11/26/19 121/76   02/26/19 129/77    Wt Readings from Last 3 Encounters:   12/02/19 87.5 kg (193 lb)   11/26/19 86.1 kg (189 lb 12.8 oz)   02/26/19 87.1 kg (192 lb)                    Reviewed and updated as needed this visit by Provider         Review of Systems   ROS COMP: CONSTITUTIONAL: NEGATIVE for fever, chills, change in weight  INTEGUMENTARY/SKIN: NEGATIVE for worrisome rashes, moles or lesions  EYES: NEGATIVE for vision changes or irritation  ENT/MOUTH: NEGATIVE for ear, mouth and throat problems  RESP: NEGATIVE for significant cough or SOB  CV: NEGATIVE for chest pain, palpitations or peripheral edema  GI: NEGATIVE for nausea, abdominal pain, heartburn, or change in bowel habits   male :POSITIVE for erectile dysfunction.  MUSCULOSKELETAL: NEGATIVE for significant arthralgias or myalgia  NEURO: NEGATIVE for weakness, dizziness or paresthesias  ENDOCRINE: NEGATIVE for temperature intolerance, skin/hair changes  HEME: NEGATIVE for bleeding problems  PSYCHIATRIC: NEGATIVE for changes in mood or affect       Objective   Reported vitals:  There were no vitals taken for this visit.     Remainder of exam unable to be completed due to telephone visits    Diagnostic Test Results:  No results found for this or any previous visit (from the past 24 hour(s)).        Assessment/Plan:  1. Hypertension goal BP (blood pressure) < 130/80    - lisinopril (ZESTRIL) 20 MG tablet; TAKE ONE TABLET BY MOUTH ONE TIME DAILY for blood pressure  Dispense: 90 tablet; Refill: 1  - chlorthalidone (HYGROTON) 25 MG tablet; TAKE ONE TABLET BY MOUTH ONE TIME DAILY for blood pressure.  Dispense: 90 tablet; Refill: 1    2. Hyperlipidemia LDL goal <130    - simvastatin (ZOCOR) 40 MG tablet; TAKE ONE TABLET BY MOUTH IN THE EVENING for cholesterol  Dispense: 90 tablet; Refill: 1    3. Prediabetes    - Hemoglobin A1c;  Future    4. Other male erectile dysfunction    - sildenafil (VIAGRA) 100 MG tablet; Take 1 tablet (100 mg) by mouth daily as needed (erectile dysfunction)  Dispense: 90 tablet; Refill: 0    Follow-up within one month for routine labs.    Phone call duration:  5 minutes  Call started: 12:30 PM  Call ended: 12:35 PM.    Bijan Dean MD

## 2020-04-23 ENCOUNTER — VIRTUAL VISIT (OUTPATIENT)
Dept: FAMILY MEDICINE | Facility: CLINIC | Age: 55
End: 2020-04-23

## 2020-04-23 VITALS — TEMPERATURE: 98 F | SYSTOLIC BLOOD PRESSURE: 127 MMHG | DIASTOLIC BLOOD PRESSURE: 78 MMHG

## 2020-04-23 DIAGNOSIS — J01.90 ACUTE SINUSITIS, RECURRENCE NOT SPECIFIED, UNSPECIFIED LOCATION: Primary | ICD-10-CM

## 2020-04-23 PROCEDURE — 99213 OFFICE O/P EST LOW 20 MIN: CPT | Mod: 95 | Performed by: INTERNAL MEDICINE

## 2020-04-23 RX ORDER — AZITHROMYCIN 250 MG/1
TABLET, FILM COATED ORAL
Qty: 6 TABLET | Refills: 2 | Status: SHIPPED | OUTPATIENT
Start: 2020-04-23 | End: 2020-08-03

## 2020-04-23 ASSESSMENT — PAIN SCALES - GENERAL: PAINLEVEL: NO PAIN (0)

## 2020-04-23 NOTE — PROGRESS NOTES
"Jose Alejandro Deras is a 54 year old male who is being evaluated via a billable telephone visit.      The patient has been notified of following:     \"This telephone visit will be conducted via a call between you and your physician/provider. We have found that certain health care needs can be provided without the need for a physical exam.  This service lets us provide the care you need with a short phone conversation.  If a prescription is necessary we can send it directly to your pharmacy.  If lab work is needed we can place an order for that and you can then stop by our lab to have the test done at a later time.    Telephone visits are billed at different rates depending on your insurance coverage. During this emergency period, for some insurers they may be billed the same as an in-person visit.  Please reach out to your insurance provider with any questions.    If during the course of the call the physician/provider feels a telephone visit is not appropriate, you will not be charged for this service.\"    Patient has given verbal consent for Telephone visit?  Yes    How would you like to obtain your AVS? Mail a copy    Subjective     Jose Alejandro Deras is a 54 year old male who presents to clinic today for the following health issues:    HPI       RESPIRATORY SYMPTOMS      Duration: acute    Description  nasal congestion, sore throat, cough, headache and myalgias    Severity: moderate    Accompanying signs and symptoms: Denies any fever or chills.    History (predisposing factors):  History of exposure to viral illness.    Precipitating or alleviating factors: None    Therapies tried and outcome:  Acetaminophen (not effective).      Patient Active Problem List   Diagnosis     Hyperlipidemia LDL goal <130     Hypertension goal BP (blood pressure) < 130/80     Erectile dysfunction     Hypotestosteronism     Vitamin D deficiency     Obesity, Class I, BMI 30-34.9     Impingement syndrome, shoulder     Rotator cuff " tendinitis     Prediabetes     Past Surgical History:   Procedure Laterality Date     NO HISTORY OF SURGERY         Social History     Tobacco Use     Smoking status: Never Smoker     Smokeless tobacco: Never Used   Substance Use Topics     Alcohol use: Yes     Alcohol/week: 0.0 standard drinks     Comment: occasional      Family History   Problem Relation Age of Onset     Hypertension Mother      Diabetes Mother      C.A.D. No family hx of      Cerebrovascular Disease No family hx of      Cancer No family hx of      Glaucoma No family hx of      Macular Degeneration No family hx of      Retinal detachment No family hx of          No Known Allergies  Recent Labs   Lab Test 12/02/19  1450 11/26/19  1103 02/26/19  1733 01/20/18  1111  08/14/17  1540  04/20/16  0922  04/12/12  1856   A1C  --  5.8*  --   --   --  5.7  --  6.0   < > 6.4*   LDL  --  113* 88 101*   < > 65   < > 100*   < >  --    HDL  --  54 44 51   < > 44   < > 40   < >  --    TRIG  --  99 102 100   < > 349*   < > 136   < >  --    ALT  --  24 24 40  --  28   < > 25   < >  --    CR  --  0.89 0.98 0.91  --  0.95   < > 1.00   < >  --    GFRESTIMATED  --  >90 86 86  --  82   < > 78   < >  --    GFRESTBLACK  --  >90 >90 >90  --  >90  African American GFR Calc     < > >90   GFR Calc     < >  --    POTASSIUM  --  4.4 3.8 4.1  --  4.0   < > 4.1   < >  --    TSH 1.85  --   --   --   --   --   --   --   --  1.29    < > = values in this interval not displayed.      BP Readings from Last 3 Encounters:   04/23/20 127/78   12/02/19 110/67   11/26/19 121/76    Wt Readings from Last 3 Encounters:   12/02/19 87.5 kg (193 lb)   11/26/19 86.1 kg (189 lb 12.8 oz)   02/26/19 87.1 kg (192 lb)                    Reviewed and updated as needed this visit by Provider         Review of Systems   ROS COMP: CONSTITUTIONAL:NEGATIVE  for anorexia, arthralgias, chills, fatigue, malaise, myalgias, sweats and weight loss  INTEGUMENTARY/SKIN: NEGATIVE for worrisome rashes,  moles or lesions  EYES: NEGATIVE for vision changes or irritation  ENT/MOUTH: NEGATIVE for ear pain , epistaxis, fever, hearing loss, hoarseness, postnasal drainage, sore throat, tooth pain and vertigo  RESP:NEGATIVE for dyspnea on exertion, hemoptysis, pleurisy and wheezing  CV: NEGATIVE for chest pain, palpitations or peripheral edema  GI: NEGATIVE for diarrhea, hematemesis, hematochezia, jaundice, melena, nausea, poor appetite and vomiting  : NEGATIVE for frequency, dysuria, or hematuria  MUSCULOSKELETAL: NEGATIVE for significant arthralgias or myalgia  NEURO: NEGATIVE for weakness, dizziness or paresthesias  ENDOCRINE: NEGATIVE for temperature intolerance, skin/hair changes  HEME: NEGATIVE for bleeding problems  PSYCHIATRIC: NEGATIVE for changes in mood or affect       Objective   Reported vitals:      Vital signs not obtained due to nature of visit.    Remainder of exam unable to be completed due to telephone visits    Diagnostic Test Results:  Labs reviewed in Epic        Assessment/Plan:  1. Acute sinusitis, recurrence not specified, unspecified location    - azithromycin (ZITHROMAX) 250 MG tablet; Two tablets first day, then one tablet daily for four days.  Dispense: 6 tablet; Refill: 2    Follow-up in 1 - 3 weeks.    Phone call duration:  5 minutes  Call start: 1:09 M  Call ended: 1:14 PM    Bijan Dean MD

## 2020-05-19 DIAGNOSIS — N52.8 OTHER MALE ERECTILE DYSFUNCTION: ICD-10-CM

## 2020-05-22 RX ORDER — SILDENAFIL 50 MG/1
TABLET, FILM COATED ORAL
Qty: 60 TABLET | Refills: 0 | Status: SHIPPED | OUTPATIENT
Start: 2020-05-22 | End: 2020-07-07

## 2020-05-22 NOTE — TELEPHONE ENCOUNTER
"Requested Prescriptions   Pending Prescriptions Disp Refills     sildenafil (VIAGRA) 50 MG tablet [Pharmacy Med Name: Sildenafil Citrate Oral Tablet 50 MG] 60 tablet 0     Sig: Take 1 tablet by mouth once daily as needed       Erectile Dysfuction Protocol Passed - 5/22/2020 10:39 AM        Passed - Absence of nitrates on medication list        Passed - Absence of Alpha Blockers on Med list        Passed - Recent (12 mo) or future (30 days) visit within the authorizing provider's specialty     Patient has had an office visit with the authorizing provider or a provider within the authorizing providers department within the previous 12 mos or has a future within next 30 days. See \"Patient Info\" tab in inbasket, or \"Choose Columns\" in Meds & Orders section of the refill encounter.              Passed - Medication is active on med list        Passed - Patient is age 18 or older           Prescription approved per [facility] Refill Protocol.          Shannan Key RN, BSN, PHN      "

## 2020-05-22 NOTE — TELEPHONE ENCOUNTER
Reason for Call:  Other prescription    Detailed comments: Pt calling to follow up on medication request for he is out and would like for Dr. Barroso to send in Rx to Pharmacy as soon as possible.  He would like a call back to confirm Rx has been sent.    Phone Number Patient can be reached at: Home number on file [phone] (home)    Best Time: anytime    Can we leave a detailed message on this number? YES    Call taken on 5/22/2020 at 9:36 AM by Basim Laughlin

## 2020-07-05 ENCOUNTER — NURSE TRIAGE (OUTPATIENT)
Dept: NURSING | Facility: CLINIC | Age: 55
End: 2020-07-05

## 2020-07-05 NOTE — TELEPHONE ENCOUNTER
Duplicate. See refill encounter dated 7/5/20.     Jennifer Alberts, RN   [facility] RN Triage

## 2020-08-03 ENCOUNTER — VIRTUAL VISIT (OUTPATIENT)
Dept: FAMILY MEDICINE | Facility: CLINIC | Age: 55
End: 2020-08-03
Payer: MEDICAID

## 2020-08-03 DIAGNOSIS — N52.8 OTHER MALE ERECTILE DYSFUNCTION: ICD-10-CM

## 2020-08-03 DIAGNOSIS — J20.8 ACUTE BRONCHITIS DUE TO OTHER SPECIFIED ORGANISMS: Primary | ICD-10-CM

## 2020-08-03 DIAGNOSIS — E78.5 HYPERLIPIDEMIA LDL GOAL <130: ICD-10-CM

## 2020-08-03 DIAGNOSIS — I10 HYPERTENSION GOAL BP (BLOOD PRESSURE) < 130/80: ICD-10-CM

## 2020-08-03 PROCEDURE — 99214 OFFICE O/P EST MOD 30 MIN: CPT | Mod: 95 | Performed by: INTERNAL MEDICINE

## 2020-08-03 RX ORDER — CHLORTHALIDONE 25 MG/1
TABLET ORAL
Qty: 90 TABLET | Refills: 1 | Status: SHIPPED | OUTPATIENT
Start: 2020-08-03 | End: 2020-10-29

## 2020-08-03 RX ORDER — AZITHROMYCIN 250 MG/1
TABLET, FILM COATED ORAL
Qty: 6 TABLET | Refills: 1 | Status: SHIPPED | OUTPATIENT
Start: 2020-08-03 | End: 2020-09-23

## 2020-08-03 RX ORDER — LISINOPRIL 20 MG/1
TABLET ORAL
Qty: 90 TABLET | Refills: 1 | Status: SHIPPED | OUTPATIENT
Start: 2020-08-03 | End: 2020-10-29

## 2020-08-03 RX ORDER — SILDENAFIL 100 MG/1
100 TABLET, FILM COATED ORAL DAILY PRN
Qty: 90 TABLET | Refills: 3 | Status: SHIPPED | OUTPATIENT
Start: 2020-08-03 | End: 2020-11-03

## 2020-08-03 RX ORDER — SIMVASTATIN 40 MG
TABLET ORAL
Qty: 90 TABLET | Refills: 1 | Status: SHIPPED | OUTPATIENT
Start: 2020-08-03 | End: 2020-10-29

## 2020-08-03 NOTE — PROGRESS NOTES
"Jose Alejandro Deras is a 55 year old male who is being evaluated via a billable telephone visit.      The patient has been notified of following:     \"This telephone visit will be conducted via a call between you and your physician/provider. We have found that certain health care needs can be provided without the need for a physical exam.  This service lets us provide the care you need with a short phone conversation.  If a prescription is necessary we can send it directly to your pharmacy.  If lab work is needed we can place an order for that and you can then stop by our lab to have the test done at a later time.    Telephone visits are billed at different rates depending on your insurance coverage. During this emergency period, for some insurers they may be billed the same as an in-person visit.  Please reach out to your insurance provider with any questions.    If during the course of the call the physician/provider feels a telephone visit is not appropriate, you will not be charged for this service.\"    Patient has given verbal consent for Telephone visit?  Yes    What phone number would you like to be contacted at? [phone]    How would you like to obtain your AVS?  Declines AVS    Subjective     Jose Alejandro Deras is a 55 year old male who presents via phone visit today for the following health issues:    HPI    Acute Illness   Acute illness concerns: cough  Onset: 1 days    Fever: no    Chills/Sweats: no    Headache (location?): mild    Sinus Pressure:no    Conjunctivitis:  no    Ear Pain: no    Rhinorrhea: no    Congestion: YES    Sore Throat: no     Cough: YES-productive of brown sputum    Wheeze: no    Decreased Appetite: no    Nausea: no    Vomiting: no    Diarrhea:  no    Dysuria/Freq.: no    Fatigue/Achiness: no    Sick/Strep Exposure: no     Therapies Tried and outcome: tylenol           Patient Active Problem List   Diagnosis     Hyperlipidemia LDL goal <130     Hypertension goal BP (blood pressure) " < 130/80     Erectile dysfunction     Hypotestosteronism     Vitamin D deficiency     Obesity, Class I, BMI 30-34.9     Impingement syndrome, shoulder     Rotator cuff tendinitis     Prediabetes     Past Surgical History:   Procedure Laterality Date     NO HISTORY OF SURGERY         Social History     Tobacco Use     Smoking status: Never Smoker     Smokeless tobacco: Never Used   Substance Use Topics     Alcohol use: Yes     Alcohol/week: 0.0 standard drinks     Comment: occasional      Family History   Problem Relation Age of Onset     Hypertension Mother      Diabetes Mother      C.A.D. No family hx of      Cerebrovascular Disease No family hx of      Cancer No family hx of      Glaucoma No family hx of      Macular Degeneration No family hx of      Retinal detachment No family hx of          No Known Allergies  Recent Labs   Lab Test 12/02/19  1450 11/26/19  1103 02/26/19  1733 01/20/18  1111  08/14/17  1540  04/20/16  0922   A1C  --  5.8*  --   --   --  5.7  --  6.0   LDL  --  113* 88 101*   < > 65   < > 100*   HDL  --  54 44 51   < > 44   < > 40   TRIG  --  99 102 100   < > 349*   < > 136   ALT  --  24 24 40  --  28   < > 25   CR  --  0.89 0.98 0.91  --  0.95   < > 1.00   GFRESTIMATED  --  >90 86 86  --  82   < > 78   GFRESTBLACK  --  >90 >90 >90  --  >90  African American GFR Calc     < > >90   GFR Calc     POTASSIUM  --  4.4 3.8 4.1  --  4.0   < > 4.1   TSH 1.85  --   --   --   --   --   --   --     < > = values in this interval not displayed.      BP Readings from Last 3 Encounters:   04/23/20 127/78   12/02/19 110/67   11/26/19 121/76    Wt Readings from Last 3 Encounters:   12/02/19 87.5 kg (193 lb)   11/26/19 86.1 kg (189 lb 12.8 oz)   02/26/19 87.1 kg (192 lb)                    Reviewed and updated as needed this visit by Provider         Review of Systems   CONSTITUTIONAL: NEGATIVE for fever, chills, change in weight  INTEGUMENTARY/SKIN: NEGATIVE for worrisome rashes, moles or  lesions  EYES: NEGATIVE for vision changes or irritation  ENT/MOUTH: NEGATIVE for ear, mouth and throat problems  RESP:NEGATIVE for hemoptysis and wheezing  CV: NEGATIVE for chest pain, palpitations or peripheral edema  GI: NEGATIVE for nausea, abdominal pain, heartburn, or change in bowel habits  : NEGATIVE for frequency, dysuria, or hematuria  MUSCULOSKELETAL: NEGATIVE for significant arthralgias or myalgia  NEURO: NEGATIVE for weakness, dizziness or paresthesias  ENDOCRINE: NEGATIVE for temperature intolerance, skin/hair changes  HEME: NEGATIVE for bleeding problems  PSYCHIATRIC: NEGATIVE for changes in mood or affect       Objective   Reported vitals:  There were no vitals taken for this visit.   Remainder of exam unable to be completed due to telephone visits    Diagnostic Test Results:  Labs reviewed in Epic        Assessment/Plan:    1. Acute bronchitis due to other specified organisms    - azithromycin (ZITHROMAX) 250 MG tablet; Two tablets first day, then one tablet daily for four days.  Dispense: 6 tablet; Refill: 1    2. Other male erectile dysfunction    - sildenafil (VIAGRA) 100 MG tablet; Take 1 tablet (100 mg) by mouth daily as needed (erectile dysfunction)  Dispense: 90 tablet; Refill: 3    3. Hyperlipidemia LDL goal <130    - simvastatin (ZOCOR) 40 MG tablet; TAKE ONE TABLET BY MOUTH IN THE EVENING for cholesterol  Dispense: 90 tablet; Refill: 1    4. Hypertension goal BP (blood pressure) < 130/80    - lisinopril (ZESTRIL) 20 MG tablet; TAKE ONE TABLET BY MOUTH ONE TIME DAILY for blood pressure  Dispense: 90 tablet; Refill: 1  - chlorthalidone (HYGROTON) 25 MG tablet; TAKE ONE TABLET BY MOUTH ONE TIME DAILY for blood pressure.  Dispense: 90 tablet; Refill: 1    Follow-up labs not earlier than 11/26/2020.  Follow-up in 2 weeks if acute condition does not improve.    Phone call duration:  5 minutes.  Start: 9:00 AM  End: 9:05 AM    Bijan Dean MD

## 2020-09-23 ENCOUNTER — VIRTUAL VISIT (OUTPATIENT)
Dept: FAMILY MEDICINE | Facility: CLINIC | Age: 55
End: 2020-09-23
Payer: MEDICAID

## 2020-09-23 DIAGNOSIS — J20.9 ACUTE BRONCHITIS, UNSPECIFIED ORGANISM: Primary | ICD-10-CM

## 2020-09-23 PROCEDURE — 99213 OFFICE O/P EST LOW 20 MIN: CPT | Mod: 95 | Performed by: INTERNAL MEDICINE

## 2020-09-23 RX ORDER — ALBUTEROL SULFATE 90 UG/1
2 AEROSOL, METERED RESPIRATORY (INHALATION) EVERY 4 HOURS PRN
Qty: 1 INHALER | Refills: 2 | Status: SHIPPED | OUTPATIENT
Start: 2020-09-23 | End: 2021-08-10

## 2020-09-23 NOTE — PROGRESS NOTES
"Jose Alejandro Deras is a 55 year old male who is being evaluated via a billable telephone visit.      The patient has been notified of following:     \"This telephone visit will be conducted via a call between you and your physician/provider. We have found that certain health care needs can be provided without the need for a physical exam.  This service lets us provide the care you need with a short phone conversation.  If a prescription is necessary we can send it directly to your pharmacy.  If lab work is needed we can place an order for that and you can then stop by our lab to have the test done at a later time.    Telephone visits are billed at different rates depending on your insurance coverage. During this emergency period, for some insurers they may be billed the same as an in-person visit.  Please reach out to your insurance provider with any questions.    If during the course of the call the physician/provider feels a telephone visit is not appropriate, you will not be charged for this service.\"    Patient has given verbal consent for Telephone visit?  Yes    What phone number would you like to be contacted at? [phone]    How would you like to obtain your AVS? Mail a copy DECLINED    Subjective     Jose Alejandro Deras is a 55 year old male who presents via phone visit today for the following health issues:    HPI    Acute Illness  Acute illness concerns: URI  Onset/Duration: 1 week  Symptoms:  Fever: no  Chills/Sweats: no  Headache (location?): no  Sinus Pressure: no  Conjunctivitis:  no  Ear Pain: no  Rhinorrhea: no  Congestion: no  Sore Throat: no- irritated  Cough: YES-non-productive, brownish green mucous. He has difficulty expectorating.  Wheeze: no  Decreased Appetite: no  Nausea: no  Vomiting: no  Diarrhea: no  Dysuria/Freq.: no  Dysuria or Hematuria: no  Fatigue/Achiness: YES  Sick/Strep Exposure: no  Therapies tried and outcome: Robitussin, Mucinex- Not helping        Review of Systems "   CONSTITUTIONAL:POSITIVE  for fatigue and NEGATIVE  for malaise and myalgias  INTEGUMENTARY/SKIN: NEGATIVE for worrisome rashes, moles or lesions  EYES: NEGATIVE for vision changes or irritation  ENT/MOUTH: NEGATIVE for epistaxis, hoarseness, sore throat and vertigo  RESP:NEGATIVE for hemoptysis, pleurisy and wheezing  CV: NEGATIVE for chest pain, palpitations or peripheral edema  GI: NEGATIVE for nausea, abdominal pain, heartburn, or change in bowel habits  : NEGATIVE for frequency, dysuria, or hematuria  MUSCULOSKELETAL: NEGATIVE for significant arthralgias or myalgia  NEURO: NEGATIVE for weakness, dizziness or paresthesias  ENDOCRINE: NEGATIVE for temperature intolerance, skin/hair changes  HEME: NEGATIVE for bleeding problems  PSYCHIATRIC: NEGATIVE for changes in mood or affect       Objective          Vitals:  No vitals were obtained today due to virtual visit.    Remainder of exam unable to be completed due to telephone visits    DIAGNOSTICS  Epic reviewed.        Assessment/Plan:  1. Acute bronchitis, unspecified organism    - amoxicillin-clavulanate (AUGMENTIN) 875-125 MG tablet; Take 1 tablet by mouth 2 times daily for 14 days  Dispense: 28 tablet; Refill: 0  - albuterol (PROAIR HFA/PROVENTIL HFA/VENTOLIN HFA) 108 (90 Base) MCG/ACT inhaler; Inhale 2 puffs into the lungs every 4 hours as needed for shortness of breath / dyspnea or wheezing  Dispense: 1 Inhaler; Refill: 2      Phone call duration:  6 minutes  Start: 9:57 AM  End: 10:03 AM    Follow-up in 14 days if condition does not improve.    Bijan Dean MD  Internal Medicine

## 2020-10-29 ENCOUNTER — DOCUMENTATION ONLY (OUTPATIENT)
Dept: FAMILY MEDICINE | Facility: CLINIC | Age: 55
End: 2020-10-29

## 2020-10-29 ENCOUNTER — ANCILLARY PROCEDURE (OUTPATIENT)
Dept: ULTRASOUND IMAGING | Facility: CLINIC | Age: 55
End: 2020-10-29
Attending: INTERNAL MEDICINE
Payer: MEDICAID

## 2020-10-29 ENCOUNTER — APPOINTMENT (OUTPATIENT)
Dept: LAB | Facility: CLINIC | Age: 55
End: 2020-10-29
Payer: MEDICAID

## 2020-10-29 ENCOUNTER — OFFICE VISIT (OUTPATIENT)
Dept: FAMILY MEDICINE | Facility: CLINIC | Age: 55
End: 2020-10-29
Payer: MEDICAID

## 2020-10-29 VITALS
HEART RATE: 98 BPM | SYSTOLIC BLOOD PRESSURE: 136 MMHG | HEIGHT: 67 IN | WEIGHT: 207 LBS | RESPIRATION RATE: 16 BRPM | DIASTOLIC BLOOD PRESSURE: 77 MMHG | BODY MASS INDEX: 32.49 KG/M2 | OXYGEN SATURATION: 99 % | TEMPERATURE: 97.5 F

## 2020-10-29 DIAGNOSIS — Z00.00 ROUTINE GENERAL MEDICAL EXAMINATION AT A HEALTH CARE FACILITY: Primary | ICD-10-CM

## 2020-10-29 DIAGNOSIS — E29.1 HYPOGONADISM MALE: ICD-10-CM

## 2020-10-29 DIAGNOSIS — R73.01 IMPAIRED FASTING GLUCOSE: ICD-10-CM

## 2020-10-29 DIAGNOSIS — N44.2 BENIGN CYST OF BOTH TESTICLES: ICD-10-CM

## 2020-10-29 DIAGNOSIS — I10 HYPERTENSION GOAL BP (BLOOD PRESSURE) < 140/90: ICD-10-CM

## 2020-10-29 DIAGNOSIS — E78.5 HYPERLIPIDEMIA LDL GOAL <100: ICD-10-CM

## 2020-10-29 DIAGNOSIS — N50.89 SCROTAL MASS: ICD-10-CM

## 2020-10-29 DIAGNOSIS — R73.01 IMPAIRED FASTING GLUCOSE: Primary | ICD-10-CM

## 2020-10-29 DIAGNOSIS — E78.5 HYPERLIPIDEMIA LDL GOAL <130: ICD-10-CM

## 2020-10-29 DIAGNOSIS — I10 HYPERTENSION GOAL BP (BLOOD PRESSURE) < 130/80: ICD-10-CM

## 2020-10-29 LAB
ALBUMIN SERPL-MCNC: 4.3 G/DL (ref 3.4–5)
ALP SERPL-CCNC: 51 U/L (ref 40–150)
ALT SERPL W P-5'-P-CCNC: 27 U/L (ref 0–70)
ANION GAP SERPL CALCULATED.3IONS-SCNC: 10 MMOL/L (ref 3–14)
AST SERPL W P-5'-P-CCNC: 23 U/L (ref 0–45)
BASOPHILS # BLD AUTO: 0.1 10E9/L (ref 0–0.2)
BASOPHILS NFR BLD AUTO: 1 %
BILIRUB SERPL-MCNC: 0.4 MG/DL (ref 0.2–1.3)
BUN SERPL-MCNC: 16 MG/DL (ref 7–30)
CALCIUM SERPL-MCNC: 9.8 MG/DL (ref 8.5–10.1)
CHLORIDE SERPL-SCNC: 103 MMOL/L (ref 94–109)
CHOLEST SERPL-MCNC: 176 MG/DL
CO2 SERPL-SCNC: 26 MMOL/L (ref 20–32)
CREAT SERPL-MCNC: 0.77 MG/DL (ref 0.66–1.25)
DIFFERENTIAL METHOD BLD: ABNORMAL
EOSINOPHIL # BLD AUTO: 0.1 10E9/L (ref 0–0.7)
EOSINOPHIL NFR BLD AUTO: 1 %
ERYTHROCYTE [DISTWIDTH] IN BLOOD BY AUTOMATED COUNT: 14.4 % (ref 10–15)
GFR SERPL CREATININE-BSD FRML MDRD: >90 ML/MIN/{1.73_M2}
GLUCOSE SERPL-MCNC: 104 MG/DL (ref 70–99)
HBA1C MFR BLD: 6.2 % (ref 0–5.6)
HCT VFR BLD AUTO: 41.2 % (ref 40–53)
HDLC SERPL-MCNC: 45 MG/DL
HGB BLD-MCNC: 13 G/DL (ref 13.3–17.7)
LDLC SERPL CALC-MCNC: 106 MG/DL
LYMPHOCYTES # BLD AUTO: 3.2 10E9/L (ref 0.8–5.3)
LYMPHOCYTES NFR BLD AUTO: 50 %
MCH RBC QN AUTO: 27.5 PG (ref 26.5–33)
MCHC RBC AUTO-ENTMCNC: 31.6 G/DL (ref 31.5–36.5)
MCV RBC AUTO: 87 FL (ref 78–100)
MONOCYTES # BLD AUTO: 0.6 10E9/L (ref 0–1.3)
MONOCYTES NFR BLD AUTO: 10 %
NEUTROPHILS # BLD AUTO: 2.4 10E9/L (ref 1.6–8.3)
NEUTROPHILS NFR BLD AUTO: 38 %
NONHDLC SERPL-MCNC: 131 MG/DL
PLATELET # BLD AUTO: 343 10E9/L (ref 150–450)
PLATELET # BLD EST: ABNORMAL 10*3/UL
POTASSIUM SERPL-SCNC: 3.9 MMOL/L (ref 3.4–5.3)
PROT SERPL-MCNC: 8 G/DL (ref 6.8–8.8)
RBC # BLD AUTO: 4.72 10E12/L (ref 4.4–5.9)
RBC MORPH BLD: NORMAL
SODIUM SERPL-SCNC: 139 MMOL/L (ref 133–144)
TRIGL SERPL-MCNC: 123 MG/DL
VARIANT LYMPHS BLD QL SMEAR: PRESENT
WBC # BLD AUTO: 6.4 10E9/L (ref 4–11)

## 2020-10-29 PROCEDURE — 80061 LIPID PANEL: CPT | Performed by: INTERNAL MEDICINE

## 2020-10-29 PROCEDURE — 76870 US EXAM SCROTUM: CPT | Mod: TC

## 2020-10-29 PROCEDURE — 84270 ASSAY OF SEX HORMONE GLOBUL: CPT | Performed by: INTERNAL MEDICINE

## 2020-10-29 PROCEDURE — 80050 GENERAL HEALTH PANEL: CPT | Performed by: INTERNAL MEDICINE

## 2020-10-29 PROCEDURE — 36415 COLL VENOUS BLD VENIPUNCTURE: CPT | Mod: 90 | Performed by: INTERNAL MEDICINE

## 2020-10-29 PROCEDURE — 99000 SPECIMEN HANDLING OFFICE-LAB: CPT | Performed by: INTERNAL MEDICINE

## 2020-10-29 PROCEDURE — 84403 ASSAY OF TOTAL TESTOSTERONE: CPT | Mod: 90 | Performed by: INTERNAL MEDICINE

## 2020-10-29 PROCEDURE — 83540 ASSAY OF IRON: CPT | Performed by: INTERNAL MEDICINE

## 2020-10-29 PROCEDURE — G0103 PSA SCREENING: HCPCS | Performed by: INTERNAL MEDICINE

## 2020-10-29 PROCEDURE — 82728 ASSAY OF FERRITIN: CPT | Performed by: INTERNAL MEDICINE

## 2020-10-29 PROCEDURE — 83036 HEMOGLOBIN GLYCOSYLATED A1C: CPT | Performed by: INTERNAL MEDICINE

## 2020-10-29 PROCEDURE — 99396 PREV VISIT EST AGE 40-64: CPT | Performed by: INTERNAL MEDICINE

## 2020-10-29 PROCEDURE — 83550 IRON BINDING TEST: CPT | Performed by: INTERNAL MEDICINE

## 2020-10-29 RX ORDER — SIMVASTATIN 40 MG
TABLET ORAL
Qty: 90 TABLET | Refills: 3 | Status: SHIPPED | OUTPATIENT
Start: 2020-10-29 | End: 2022-10-09

## 2020-10-29 RX ORDER — LISINOPRIL 20 MG/1
TABLET ORAL
Qty: 90 TABLET | Refills: 3 | Status: SHIPPED | OUTPATIENT
Start: 2020-10-29 | End: 2022-10-09

## 2020-10-29 RX ORDER — CHLORTHALIDONE 25 MG/1
TABLET ORAL
Qty: 90 TABLET | Refills: 3 | Status: SHIPPED | OUTPATIENT
Start: 2020-10-29 | End: 2022-10-09

## 2020-10-29 ASSESSMENT — MIFFLIN-ST. JEOR: SCORE: 1727.58

## 2020-10-29 NOTE — PATIENT INSTRUCTIONS
Preventive Health Recommendations  Male Ages 50 - 64    Yearly exam:             See your health care provider every year in order to  o   Review health changes.   o   Discuss preventive care.    o   Review your medicines if your doctor has prescribed any.     Have a cholesterol test every 5 years, or more frequently if you are at risk for high cholesterol/heart disease.     Have a diabetes test (fasting glucose) every three years. If you are at risk for diabetes, you should have this test more often.     Have a colonoscopy at age 50, or have a yearly FIT test (stool test). These exams will check for colon cancer.      Talk with your health care provider about whether or not a prostate cancer screening test (PSA) is right for you.    You should be tested each year for STDs (sexually transmitted diseases), if you re at risk.     Shots: Get a flu shot each year. Get a tetanus shot every 10 years.     Nutrition:    Eat at least 5 servings of fruits and vegetables daily.     Eat whole-grain bread, whole-wheat pasta and brown rice instead of white grains and rice.     Get adequate Calcium and Vitamin D.     Lifestyle    Exercise for at least 150 minutes a week (30 minutes a day, 5 days a week). This will help you control your weight and prevent disease.     Limit alcohol to one drink per day.     No smoking.     Wear sunscreen to prevent skin cancer.     See your dentist every six months for an exam and cleaning.     See your eye doctor every 1 to 2 years.  At [facility], we strive to deliver an exceptional experience to you, every time we see you. If you receive a survey, please complete it as we do value your feedback.  If you have [employer], you can expect to receive results automatically within 24 hours of their completion.  Your provider will send a note interpreting your results as well.   If you do not have "[employer]hart, you should receive your results in about a week by mail.    Your  care team:                            Family Medicine Internal Medicine   MD Bijan Medrano, MD Miriam Greer, MD Jayleen Garcia PA-C, APRN DUY Morrison, MD Pediatrics   Jose Amaya, PAWiliamC  Radha Benjamin, MD Lydia Briones APRN CNP   Berenice Machuca, MD Teri Vera, MD Saba Robert, MD Nabila Gomez, APRN CNP  Sophie Figueroa, PAEMMA Watkins, MD Lissette Garcia MD Angela Wermerskirchen, MD      Clinic hours: Monday - Thursday 7 am-7 pm; Fridays 7 am-5 pm.   Urgent care: Monday - Friday 11 am-9 pm; Saturday and Sunday 9 am-5 pm.    Clinic: [phone]       [facility]: Monday - Thursday 8 am - 7 pm; Friday 8 am - 6 pm  [facility]: [phone]     Use [email or web address] for 24/7 diagnosis and treatment of dozens of conditions.

## 2020-10-29 NOTE — PROGRESS NOTES
3  SUBJECTIVE:   CC: Jose Alejandro Deras is an 55 year old male who presents for preventive health visit.     Patient has been advised of split billing requirements and indicates understanding: Yes  Healthy Habits:    Do you get at least three servings of calcium containing foods daily (dairy, green leafy vegetables, etc.)? yes    Amount of exercise or daily activities, outside of work: 7 day(s) per week    Problems taking medications regularly No    Medication side effects: No    Have you had an eye exam in the past two years? yes    Do you see a dentist twice per year? yes    Do you have sleep apnea, excessive snoring or daytime drowsiness?no      Today's PHQ-2 Score:   PHQ-2 ( 1999 [employer]) 10/29/2020 8/3/2020   Q1: Little interest or pleasure in doing things 0 0   Q2: Feeling down, depressed or hopeless 0 0   PHQ-2 Score 0 0       Abuse: Current or Past(Physical, Sexual or Emotional)- No  Do you feel safe in your environment? Yes    Have you ever done Advance Care Planning? (For example, a Health Directive, POLST, or a discussion with a medical provider or your loved ones about your wishes): No, advance care planning information given to patient to review.  Patient plans to discuss their wishes with loved ones or provider.      Social History     Tobacco Use     Smoking status: Never Smoker     Smokeless tobacco: Never Used   Substance Use Topics     Alcohol use: Yes     Alcohol/week: 0.0 standard drinks     Comment: occasional      If you drink alcohol do you typically have >3 drinks per day or >7 drinks per week? No                      Last PSA: No results found for: PSA    Reviewed orders with patient. Reviewed health maintenance and updated orders accordingly - Yes  Labs reviewed in EPIC  BP Readings from Last 3 Encounters:   10/29/20 136/77   04/23/20 127/78   12/02/19 110/67    Wt Readings from Last 3 Encounters:   10/29/20 93.9 kg (207 lb)   12/02/19 87.5 kg (193 lb)   11/26/19 86.1 kg (189 lb 12.8 oz)                   Patient Active Problem List   Diagnosis     Hyperlipidemia LDL goal <130     Hypertension goal BP (blood pressure) < 130/80     Erectile dysfunction     Hypotestosteronism     Vitamin D deficiency     Obesity, Class I, BMI 30-34.9     Impingement syndrome, shoulder     Rotator cuff tendinitis     Prediabetes     Acute bronchitis, unspecified organism     Cyst of testis     Spermatocele of epididymis, multiple     Past Surgical History:   Procedure Laterality Date     NO HISTORY OF SURGERY         Social History     Tobacco Use     Smoking status: Never Smoker     Smokeless tobacco: Never Used   Substance Use Topics     Alcohol use: Yes     Alcohol/week: 0.0 standard drinks     Comment: occasional      Family History   Problem Relation Age of Onset     Hypertension Mother      Diabetes Mother      C.A.D. No family hx of      Cerebrovascular Disease No family hx of      Cancer No family hx of      Glaucoma No family hx of      Macular Degeneration No family hx of      Retinal detachment No family hx of          No Known Allergies  Recent Labs   Lab Test 10/29/20  1350 12/02/19  1450 11/26/19  1103 02/26/19  1733 08/14/17  1540 08/14/17  1540   A1C 6.2*  --  5.8*  --   --  5.7   *  --  113* 88   < > 65   HDL 45  --  54 44   < > 44   TRIG 123  --  99 102   < > 349*   ALT 27  --  24 24   < > 28   CR 0.77  --  0.89 0.98   < > 0.95   GFRESTIMATED >90  --  >90 86   < > 82   GFRESTBLACK >90  --  >90 >90   < > >90  African American GFR Calc     POTASSIUM 3.9  --  4.4 3.8   < > 4.0   TSH 0.84 1.85  --   --   --   --     < > = values in this interval not displayed.        Reviewed and updated as needed this visit by clinical staff  Tobacco  Allergies               Reviewed and updated as needed this visit by Provider                    ROS:  CONSTITUTIONAL: NEGATIVE for fever, chills, change in weight  INTEGUMENTARY/SKIN: NEGATIVE for worrisome rashes, moles or lesions  EYES: NEGATIVE for vision changes  "or irritation  ENT: NEGATIVE for ear, mouth and throat problems  RESP: NEGATIVE for significant cough or SOB  CV: NEGATIVE for chest pain, palpitations or peripheral edema  GI: NEGATIVE for nausea, abdominal pain, heartburn, or change in bowel habits   male: negative for dysuria, hematuria, decreased urinary stream, erectile dysfunction, urethral discharge  MUSCULOSKELETAL: NEGATIVE for significant arthralgias or myalgia  NEURO: NEGATIVE for weakness, dizziness or paresthesias  PSYCHIATRIC: NEGATIVE for changes in mood or affect    OBJECTIVE:   /77 (BP Location: Right arm, Patient Position: Chair, Cuff Size: Adult Large)   Pulse 98   Temp 97.5  F (36.4  C) (Tympanic)   Resp 16   Ht 1.702 m (5' 7\")   Wt 93.9 kg (207 lb)   SpO2 99%   BMI 32.42 kg/m    EXAM:  GENERAL: healthy, alert and no distress  EYES: Eyes grossly normal to inspection, PERRL and conjunctivae and sclerae normal  HENT: ear canals and TM's normal, nose and mouth without ulcers or lesions  NECK: no adenopathy, no asymmetry, masses, or scars and thyroid normal to palpation  RESP: lungs clear to auscultation - no rales, rhonchi or wheezes  CV: regular rate and rhythm, normal S1 S2, no S3 or S4, no murmur, click or rub, no peripheral edema and peripheral pulses strong  ABDOMEN: soft, nontender, no hepatosplenomegaly, no masses and bowel sounds normal  MS: no gross musculoskeletal defects noted, no edema  SKIN: no suspicious lesions or rashes  NEURO: Normal strength and tone, mentation intact and speech normal  PSYCH: mentation appears normal, affect normal/bright    Diagnostic Test Results:  Results for orders placed or performed in visit on 10/29/20   Testosterone Free and Total     Status: None   Result Value Ref Range    Testosterone Total 254 240 - 950 ng/dL    Sex Hormone Binding Globulin 20 11 - 80 nmol/L    Free Testosterone Calculated 6.37 4.7 - 24.4 ng/dL   TSH with free T4 reflex     Status: None   Result Value Ref Range    TSH " 0.84 0.40 - 4.00 mU/L   PSA, screen     Status: None   Result Value Ref Range    PSA 1.62 0 - 4 ug/L   Ferritin     Status: None   Result Value Ref Range    Ferritin 190 26 - 388 ng/mL   Iron and iron binding capacity     Status: None   Result Value Ref Range    Iron 85 35 - 180 ug/dL    Iron Binding Cap 300 240 - 430 ug/dL    Iron Saturation Index 28 15 - 46 %   CBC with platelets and differential     Status: Abnormal   Result Value Ref Range    WBC 6.4 4.0 - 11.0 10e9/L    RBC Count 4.72 4.4 - 5.9 10e12/L    Hemoglobin 13.0 (L) 13.3 - 17.7 g/dL    Hematocrit 41.2 40.0 - 53.0 %    MCV 87 78 - 100 fl    MCH 27.5 26.5 - 33.0 pg    MCHC 31.6 31.5 - 36.5 g/dL    RDW 14.4 10.0 - 15.0 %    Platelet Count 343 150 - 450 10e9/L    % Neutrophils 38.0 %    % Lymphocytes 50.0 %    % Monocytes 10.0 %    % Eosinophils 1.0 %    % Basophils 1.0 %    Absolute Neutrophil 2.4 1.6 - 8.3 10e9/L    Absolute Lymphocytes 3.2 0.8 - 5.3 10e9/L    Absolute Monocytes 0.6 0.0 - 1.3 10e9/L    Absolute Eosinophils 0.1 0.0 - 0.7 10e9/L    Absolute Basophils 0.1 0.0 - 0.2 10e9/L    Reactive Lymphs Present     RBC Morphology Normal     Platelet Estimate       Automated count confirmed.  Platelet morphology is normal.    Diff Method Automated Method    Results for orders placed or performed in visit on 10/29/20   US Testicular and Scrotum     Status: None    Narrative    ULTRASOUND TESTICULAR AND SCROTUM 10/29/2020 4:53 PM    CLINICAL HISTORY: Left scrotal mass.    TECHNIQUE: Ultrasound of scrotum with color flow and spectral Doppler  with waveform analysis performed.    COMPARISON: None.    FINDINGS:  RIGHT: Right testicle measures 3.8 x 2.5 x 3.7 cm. Normal testicle  with no masses. Normal arterial duplex and normal color flow. Two  epididymal simple cysts are noted, with the largest in the epididymal  tail measuring 1 cm. No hydrocele. Small right hydrocele.    LEFT: Left testicle measures 4 x 2.6 x 3 cm. There is a small simple  cyst along the  anterior aspect of the left testicle inferiorly,  measuring 0.6 cm. 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 No other focal testicular lesions  are identified. Normal arterial duplex and normal color flow. A simple  cyst in the left epididymal head measures 1.7 cm. Small left  hydrocele. No varicocele.      Impression    IMPRESSION:  1.  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  2.  Bilateral epididymal simple cysts are also noted, with the largest  in the left epididymal head measuring 1.7 cm.  3.  Small bilateral hydroceles.  4.  No evidence for testicular torsion or intratesticular mass.    MARK LINDO MD   Results for orders placed or performed in visit on 10/29/20   Comprehensive metabolic panel (BMP + Alb, Alk Phos, ALT, AST, Total. Bili, TP)     Status: Abnormal   Result Value Ref Range    Sodium 139 133 - 144 mmol/L    Potassium 3.9 3.4 - 5.3 mmol/L    Chloride 103 94 - 109 mmol/L    Carbon Dioxide 26 20 - 32 mmol/L    Anion Gap 10 3 - 14 mmol/L    Glucose 104 (H) 70 - 99 mg/dL    Urea Nitrogen 16 7 - 30 mg/dL    Creatinine 0.77 0.66 - 1.25 mg/dL    GFR Estimate >90 >60 mL/min/[1.73_m2]    GFR Estimate If Black >90 >60 mL/min/[1.73_m2]    Calcium 9.8 8.5 - 10.1 mg/dL    Bilirubin Total 0.4 0.2 - 1.3 mg/dL    Albumin 4.3 3.4 - 5.0 g/dL    Protein Total 8.0 6.8 - 8.8 g/dL    Alkaline Phosphatase 51 40 - 150 U/L    ALT 27 0 - 70 U/L    AST 23 0 - 45 U/L   Lipid panel reflex to direct LDL Non-fasting     Status: Abnormal   Result Value Ref Range    Cholesterol 176 <200 mg/dL    Triglycerides 123 <150 mg/dL    HDL Cholesterol 45 >39 mg/dL    LDL Cholesterol Calculated 106 (H) <100 mg/dL    Non HDL Cholesterol 131 (H) <130 mg/dL   Hemoglobin A1c     Status: Abnormal   Result Value Ref Range    Hemoglobin A1C 6.2 (H) 0 - 5.6 %       ASSESSMENT/PLAN:   1.  "Routine general medical examination at a health care facility    - TSH with free T4 reflex  - PSA, screen  - Ferritin  - Iron and iron binding capacity  - CBC with platelets and differential    2. Hypertension goal BP (blood pressure) < 130/80    - chlorthalidone (HYGROTON) 25 MG tablet; TAKE ONE TABLET BY MOUTH ONE TIME DAILY for blood pressure.  Dispense: 90 tablet; Refill: 3  - lisinopril (ZESTRIL) 20 MG tablet; TAKE ONE TABLET BY MOUTH ONE TIME DAILY for blood pressure  Dispense: 90 tablet; Refill: 3    3. Hyperlipidemia LDL goal <130    - simvastatin (ZOCOR) 40 MG tablet; TAKE ONE TABLET BY MOUTH IN THE EVENING for cholesterol  Dispense: 90 tablet; Refill: 3    4. Hypogonadism male    - Testosterone Free and Total    5. Benign cyst of both testicles    - US Testicular and Scrotum; Future  - UROLOGY ADULT REFERRAL; Future    Patient has been advised of split billing requirements and indicates understanding: Yes  COUNSELING:  Special attention given to:        Regular exercise       Healthy diet/nutrition       The 10-year ASCVD risk score ([place] BRYON Jr., et al., 2013) is: 12.7%    Values used to calculate the score:      Age: 55 years      Sex: Male      Is Non- : Yes      Diabetic: No      Tobacco smoker: No      Systolic Blood Pressure: 136 mmHg      Is BP treated: Yes      HDL Cholesterol: 45 mg/dL      Total Cholesterol: 176 mg/dL    Estimated body mass index is 32.42 kg/m  as calculated from the following:    Height as of this encounter: 1.702 m (5' 7\").    Weight as of this encounter: 93.9 kg (207 lb).    Weight management plan: diet and exercise.    He reports that he has never smoked. He has never used smokeless tobacco.      Counseling Resources:  ATP IV Guidelines  Pooled Cohorts Equation Calculator  FRAX Risk Assessment  ICSI Preventive Guidelines  Dietary Guidelines for Americans, 2010  USDA's MyPlate  ASA Prophylaxis  Lung CA Screening    Bijan Dean MD  [facility] " [facility], [place]

## 2020-10-30 LAB
FERRITIN SERPL-MCNC: 190 NG/ML (ref 26–388)
IRON SATN MFR SERPL: 28 % (ref 15–46)
IRON SERPL-MCNC: 85 UG/DL (ref 35–180)
PSA SERPL-ACNC: 1.62 UG/L (ref 0–4)
TIBC SERPL-MCNC: 300 UG/DL (ref 240–430)
TSH SERPL DL<=0.005 MIU/L-ACNC: 0.84 MU/L (ref 0.4–4)

## 2020-11-01 LAB
SHBG SERPL-SCNC: 20 NMOL/L (ref 11–80)
TESTOST FREE SERPL-MCNC: 6.37 NG/DL (ref 4.7–24.4)
TESTOST SERPL-MCNC: 254 NG/DL (ref 240–950)

## 2020-11-03 ENCOUNTER — TELEPHONE (OUTPATIENT)
Dept: FAMILY MEDICINE | Facility: CLINIC | Age: 55
End: 2020-11-03

## 2020-11-03 PROBLEM — N44.2: Status: ACTIVE | Noted: 2020-11-03

## 2020-11-03 PROBLEM — N43.42 SPERMATOCELE OF EPIDIDYMIS, MULTIPLE: Status: ACTIVE | Noted: 2020-11-03

## 2020-11-03 NOTE — TELEPHONE ENCOUNTER
Reason for Call:  Test results    Detailed comments: patient would like a call for test results    Phone Number Patient can be reached at: Home number on file [phone] (home)    Best Time: any    Can we leave a detailed message on this number? YES    Call taken on 11/3/2020 at 9:37 AM by Rahul Argueta

## 2020-11-07 ENCOUNTER — TELEPHONE (OUTPATIENT)
Dept: FAMILY MEDICINE | Facility: CLINIC | Age: 55
End: 2020-11-07

## 2020-11-07 NOTE — TELEPHONE ENCOUNTER
Pt would like most recent lab and imaging results  10/2020 mailed to him.      Thank you,   Terra JONES   [place] Scheduling  [phone]

## 2020-11-13 NOTE — TELEPHONE ENCOUNTER
.Reason for Call:   results    Detailed comments: Patient is asking for a copy of his results to be sent   to the [place] address; labs, ultrasound, physical done on 10-29;   Phone Number Patient can be reached at: Cell number on file:    Telephone Information:   Mobile [phone]       Best Time: anytime    Can we leave a detailed message on this number? YES    Call taken on 11/13/2020 at 9:28 AM by Becka Brooks

## 2020-12-14 ENCOUNTER — TELEPHONE (OUTPATIENT)
Dept: FAMILY MEDICINE | Facility: CLINIC | Age: 55
End: 2020-12-14

## 2020-12-14 NOTE — TELEPHONE ENCOUNTER
Reason for Call:  Other prescription and Prior authorization     Detailed comments: States his insurance changed and will not cover Testosterone (ANDROGEL) 20.25 MG/1.25GM (1.62%) GEL. Pharmacy told hm to have hos PCP reach out to insurance company to authorize this. Please call Carloz and let him know as soon as this is taken care of.  Thank you     Phone Number Patient can be reached at: Home number on file [phone] (home)    Best Time: Any    Can we leave a detailed message on this number? YES    Call taken on 12/14/2020 at 9:59 AM by Rocio Haider

## 2020-12-15 NOTE — TELEPHONE ENCOUNTER
Central Prior Authorization Team  Phone: [phone]    PA Initiation    Medication: Testosterone (ANDROGEL) 20.25 MG/1.25GM (1.62%) GEL  Insurance Company: [place] Medicaid ([facility]) - Phone [phone] Fax [phone]  Pharmacy Filling the Rx: [id number] - [place], [place] - [address]  Filling Pharmacy Phone: [phone]  Filling Pharmacy Fax:    Start Date: 12/15/2020

## 2020-12-16 NOTE — TELEPHONE ENCOUNTER
PRIOR AUTHORIZATION DENIED    Medication: Testosterone (ANDROGEL) 20.25 MG/1.25GM (1.62%) GEL- DENIED     Denial Date: 12/16/2020     Denial Rational: Per call to [place] medicaid, brand androgel/ androgerm are preferred but they also require prior authorizations. Approval requires documentation of 2 early morning (prior to 10 am) testosterone lab results that confirms low testosterone within the last 18 months.      Appeal Information: If provider would like to appeal please provide a letter of medical necessity.

## 2020-12-16 NOTE — TELEPHONE ENCOUNTER
PA denial explanation reviewed.    It requires that current testosterone levels are BELOW normal.    Current testosterone levels last 10/29/2020 are within normal range

## 2021-02-02 ENCOUNTER — OFFICE VISIT (OUTPATIENT)
Dept: UROLOGY | Facility: CLINIC | Age: 56
End: 2021-02-02
Attending: INTERNAL MEDICINE
Payer: COMMERCIAL

## 2021-02-02 DIAGNOSIS — N44.1 CYST OF TUNICA ALBUGINEA TESTIS: Primary | ICD-10-CM

## 2021-02-02 DIAGNOSIS — N44.2 BENIGN CYST OF BOTH TESTICLES: ICD-10-CM

## 2021-02-02 PROCEDURE — 99203 OFFICE O/P NEW LOW 30 MIN: CPT | Performed by: UROLOGY

## 2021-02-02 NOTE — PROGRESS NOTES
Urology Consult History and Physical  RAVI CHAVEZ  Name: Jose Alejandro Deras    MRN: [id number]   YOB: 1965       We were asked to see Jose Alejandro Deras at the request of Dr. Dean for evaluation and treatment of Left testicular cyst.        Chief Complaint:   LEFT testicular cyst    History is obtained from the patient            History of Present Illness:   Jose Alejandro Deras is a 55 year old male who is being seen for evaluation of a cyst of the left testicle as well as bilateral epididymal cysts.  He first noticed a palpable lump in the posterior aspect of his left testicle a few months ago.  He was evaluated by his PCP, Dr. Dean, who obtained a scrotal ultrasound.  He denies any change in size of this cyst, or pain associated with this.  He denies any family history of testicular cancer.             Past Medical History:     Past Medical History:   Diagnosis Date     Erectile dysfunction      Essential hypertension, benign      Hyperlipidemia LDL goal <130      Hypotestosteronism      IGT (impaired glucose tolerance)      Left-sided Bell's palsy 09/19/2017     Obesity, Class I, BMI 30-34.9 8/23/2013     Overweight (BMI 25.0-29.9) 8/23/2013     Right-sided Bell's palsy 05/11/2017    resolved by august 2017     Vitamin D deficiency             Past Surgical History:     Past Surgical History:   Procedure Laterality Date     NO HISTORY OF SURGERY              Social History:     Social History     Tobacco Use     Smoking status: Never Smoker     Smokeless tobacco: Never Used   Substance Use Topics     Alcohol use: Yes     Alcohol/week: 0.0 standard drinks     Comment: occasional        History   Smoking Status     Never Smoker   Smokeless Tobacco     Never Used            Family History:     Family History   Problem Relation Age of Onset     Hypertension Mother      Diabetes Mother      C.A.D. No family hx of      Cerebrovascular Disease No family hx of      Cancer No family hx of      Glaucoma No  family hx of      Macular Degeneration No family hx of      Retinal detachment No family hx of               Allergies:   No Known Allergies         Medications:     Current Outpatient Medications   Medication Sig     albuterol (PROAIR HFA/PROVENTIL HFA/VENTOLIN HFA) 108 (90 Base) MCG/ACT inhaler Inhale 2 puffs into the lungs every 4 hours as needed for shortness of breath / dyspnea or wheezing     chlorthalidone (HYGROTON) 25 MG tablet TAKE ONE TABLET BY MOUTH ONE TIME DAILY for blood pressure.     lisinopril (ZESTRIL) 20 MG tablet TAKE ONE TABLET BY MOUTH ONE TIME DAILY for blood pressure     MULTIPLE VITAMINS PO Take by mouth daily     simvastatin (ZOCOR) 40 MG tablet TAKE ONE TABLET BY MOUTH IN THE EVENING for cholesterol     tadalafil (CIALIS) 20 MG tablet Take 1 tablet (20 mg) by mouth daily as needed (erectile dysfunction)     Testosterone (ANDROGEL) 20.25 MG/1.25GM (1.62%) GEL Place 1 packet (20.25 mg) onto the skin daily     No current facility-administered medications for this visit.              Review of Systems:     Skin: negative  Eyes: negative  Ears/Nose/Throat: negative  Respiratory: No shortness of breath, dyspnea on exertion, cough, or hemoptysis  Cardiovascular: negative  Gastrointestinal: negative  Genitourinary: as above  Musculoskeletal: negative  Neurologic: negative  Psychiatric: negative  Hematologic/Lymphatic/Immunologic: negative  Endocrine: negative          Physical Exam:   No data found.  There is no height or weight on file to calculate BMI.     General: age-appropriate appearing male in NAD  HEENT: Head AT/NC, EOMI, CN Grossly intact  Lungs: no respiratory distress, or pursed lip breathing  Heart: No obvious jugular venous distension present  Back: no bony midline tenderness, no CVAT bilaterally.  Abdomen: soft, non-distended, non-tender. No organomegaly  : normal circumcised phallus, normal RIGHT testicle, small RIGHT epididymal cyst, LEFT testicle with a small palpable nodule on  the posterior and inferior aspect of the testicle, small LEFT epididymal cyst  Lymph: no palpable inguinal lymphadenopathy.  LE: no edema.   Musculoskeltal: extremities normal, no peripheral edema  Skin: no suspicious lesions or rashes  Neuro: Alert, oriented, speech and mentation normal;  moving all 4 extremities equally.  Psych: affect and mood normal          Data:   All laboratory data reviewed:  PSA   Date Value Ref Range Status   10/29/2020 1.62 0 - 4 ug/L Final     Comment:     Assay Method:  Chemiluminescence using Siemens Vista analyzer      Lab Results   Component Value Date    CR 0.77 10/29/2020        IMAGING:  All pertinent imaging reviewed:    All imaging studies reviewed by me.  I personally reviewed these imaging films.  A formal report from radiology will follow.    FINDINGS:  RIGHT: Right testicle measures 3.8 x 2.5 x 3.7 cm. Normal testicle  with no masses. Normal arterial duplex and normal color flow. Two  epididymal simple cysts are noted, with the largest in the epididymal  tail measuring 1 cm. No hydrocele. Small right hydrocele.     LEFT: Left testicle measures 4 x 2.6 x 3 cm. There is a small simple  cyst along the anterior aspect of the left testicle inferiorly,  measuring 0.6 cm. 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 No other focal testicular lesions  are identified. Normal arterial duplex and normal color flow. A simple  cyst in the left epididymal head measures 1.7 cm. Small left  hydrocele. No varicocele.                                                                      IMPRESSION:  1.  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  2.  Bilateral epididymal simple cysts are also noted, with the largest  in the left epididymal head measuring 1.7 cm.  3.  Small bilateral hydroceles.  4.  No evidence for testicular torsion or  "intratesticular mass.         Impression and Plan:   Impression:   55-year-old man with a left tunica albuginea cyst as well as bilateral epididymal cysts      Plan:   Left tunica albuginea cyst  -On exam he does have a palpable nodule on the posterior inferior aspect of his testicle.  This correlates with the 6 mm cyst noted on the ultrasound which was noted to \"most likely represent a tunica albuginea cyst \"  -We will plan for 6-month follow-up with a repeat scrotal ultrasound to ensure stability of the cyst.  If this remains stable at that time then no further follow-up imaging will be required  -We discussed indications for operative intervention would be a more solid-appearing mass, or if he were to have significant bothersome symptoms related to the cyst    Bilateral spermatoceles  -We discussed the ultrasound also showed bilateral cysts of the epididymis, spermatoceles  -We discussed the indications for intervention would be pain related to the spermatoceles or increased size  -These will also be reimaged in 6 months     Thank you for the kind consultation.    Chart documentation with Dragon Voice recognition Software. Although reviewed after completion, some words and grammatical errors may remain.    Time spent: 39 minutes spent on the date of the encounter doing chart review, history and exam, documentation and further activities as noted above.    Joseph Crow MD   Urology  [facility]  [facility] Phone: [phone]  [facility] Phone: [phone]       "

## 2021-02-02 NOTE — NURSING NOTE
Jose Alejandro Deras's goals for this visit include:   Chief Complaint   Patient presents with     New Patient     Bilateral cysts on testicles     Cyst       He requests these members of his care team be copied on today's visit information:     PCP: Bijan Dean    Referring Provider:  Bijan Dean MD  [id number] [place] [address]    There were no vitals taken for this visit.    Do you need any medication refills at today's visit? Julianna Nolan LPN

## 2021-02-21 DIAGNOSIS — N52.8 OTHER MALE ERECTILE DYSFUNCTION: ICD-10-CM

## 2021-02-22 RX ORDER — SILDENAFIL 50 MG/1
TABLET, FILM COATED ORAL
Qty: 60 TABLET | Refills: 0 | OUTPATIENT
Start: 2021-02-22

## 2021-02-23 DIAGNOSIS — N52.8 OTHER MALE ERECTILE DYSFUNCTION: ICD-10-CM

## 2021-02-23 RX ORDER — SILDENAFIL 50 MG/1
TABLET, FILM COATED ORAL
Qty: 60 TABLET | Refills: 0 | OUTPATIENT
Start: 2021-02-23

## 2021-02-26 ENCOUNTER — TELEPHONE (OUTPATIENT)
Dept: FAMILY MEDICINE | Facility: CLINIC | Age: 56
End: 2021-02-26

## 2021-02-26 DIAGNOSIS — N52.8 OTHER MALE ERECTILE DYSFUNCTION: Primary | ICD-10-CM

## 2021-02-26 RX ORDER — SILDENAFIL 50 MG/1
50 TABLET, FILM COATED ORAL DAILY PRN
Qty: 30 TABLET | Refills: 5 | Status: SHIPPED | OUTPATIENT
Start: 2021-02-26 | End: 2022-04-08

## 2021-02-26 NOTE — TELEPHONE ENCOUNTER
Patient calling and stated the cialis is not effective for patient.  Patient prefers to go back to sildenafil (VIAGRA) 50 MG tablet. Please send a year refill for patient. If there is any questions please call patient. FYI:  Patient is currently in [place].   Vinay Camacho,  For 1st Floor Primary Care

## 2021-03-25 ENCOUNTER — VIRTUAL VISIT (OUTPATIENT)
Dept: FAMILY MEDICINE | Facility: CLINIC | Age: 56
End: 2021-03-25
Payer: COMMERCIAL

## 2021-03-25 VITALS — WEIGHT: 215 LBS | BODY MASS INDEX: 33.74 KG/M2 | HEIGHT: 67 IN

## 2021-03-25 DIAGNOSIS — R05.8 PRODUCTIVE COUGH: Primary | ICD-10-CM

## 2021-03-25 DIAGNOSIS — J20.8 ACUTE BRONCHITIS DUE TO OTHER SPECIFIED ORGANISMS: ICD-10-CM

## 2021-03-25 DIAGNOSIS — Z20.822 SUSPECTED COVID-19 VIRUS INFECTION: ICD-10-CM

## 2021-03-25 PROCEDURE — 99213 OFFICE O/P EST LOW 20 MIN: CPT | Mod: 95 | Performed by: FAMILY MEDICINE

## 2021-03-25 ASSESSMENT — MIFFLIN-ST. JEOR: SCORE: 1763.86

## 2021-03-25 NOTE — PROGRESS NOTES
Carloz is a 55 year old who is being evaluated via a billable telephone visit.      What phone number would you like to be contacted at? [phone]  How would you like to obtain your AVS? Mail a copy    Assessment & Plan     (R05) Productive cough  (primary encounter diagnosis)  Comment: Differential diagnosis includes spring allergies, viral URI (including coronavirus), so then rule out sinusitis  Plan: amoxicillin-clavulanate (AUGMENTIN) 875-125 MG         tablet        Consider postponing the antibiotic until he continues to have symptoms by around 3/28 or 3/29.  Try OTC cetirizine.  Follow-up as needed    (Z20.822) Suspected COVID-19 virus infection  Comment: Even though he recently had the first dose of the vaccine, it is still possible that he has mild disease  Plan: I recommend that he gets tested to see if he needs to postpone the second dose (Pfizer)                 Return in about 10 days (around 4/4/2021) for recheck if symptoms fail to resolve by then, or sooner if symptoms worsen.    Maurice Barroso MD  [facility]   Carloz is a 55 year old who presents for the following health issues     HPI     Acute Illness  Acute illness concerns: Coughing up phlegm, kind of greenish looking  Onset/Duration: x last night   Symptoms:  Fever: no  Chills/Sweats: no  Headache (location?): no  Sinus Pressure: no  Conjunctivitis:  no  Ear Pain: no  Rhinorrhea: no  Congestion: no  Sore Throat: no  Cough: yes  Wheeze: no  Decreased Appetite: no  Nausea: no  Vomiting: no  Diarrhea: no  Dysuria/Freq.: no  Dysuria or Hematuria: no  Fatigue/Achiness: no  Sick/Strep Exposure: no  Therapies tried and outcome: Robitussin    Concern for COVID-19  About how many days ago did these symptoms start? 1 days  Is this your first visit for this illness? Yes  In the 14 days before your symptoms started, have you had close contact with someone with COVID-19 (Coronavirus)? I do not know.  Are you having  new or worsening difficulty breathing? Yes   Please describe what kind of difficulty you are having breathing:Mild dyspnea (able to do ADLs without difficulty, mild shortness of breath with activities such as climbing one or two flights of stairs or walking briskly), it reminds him of a mild asthma attack  Have you experienced any of the following NEW symptoms?    Loss of taste or smell: No    Chest pain: No    Rash: No  What treatments have you tried? none  Do you have a way to get food/medications if quarantined? Yes, I have a friend or family member who can help me.      Review of Systems   Constitutional, HEENT, cardiovascular, pulmonary, gi and gu systems are negative, except as otherwise noted.      Objective           Vitals:  No vitals were obtained today due to virtual visit.    Physical Exam   healthy, alert and no distress  PSYCH: Alert and oriented times 3; coherent speech, normal   rate and volume, able to articulate logical thoughts, able   to abstract reason, no tangential thoughts, no hallucinations   or delusions  His affect is normal and pleasant  RESP: No cough, no audible wheezing, able to talk in full sentences  Remainder of exam unable to be completed due to telephone visits              Phone call duration: 14 minutes

## 2021-03-25 NOTE — TELEPHONE ENCOUNTER
Is not standard practice to prescribe antibiotics without provider evaluation first so visit was recommended by RN below.  Patient is also out of state so is unclear if provider can even prescribe or treat in different state. Patient is noted to currently reside in [place], however is noting a pharmacy in [place].    See previous message and patient comments below.    Routing to PCP to review and advise.    Shannan Crisostomo RN  [facility]

## 2021-03-25 NOTE — TELEPHONE ENCOUNTER
"Patient reports he is in [place] for a long period of time.    Patient requests a telephone visit to address the antibiotic refill.  Discussed this writer is unable to schedule appointment with patient out of state and for productive cough.  Traditionally this is seen in the clinic to assess lung sounds.  Patient expressed a lot of frustration, \"This is not your business, Dr Dean does this for me all the time, just schedule the appointment\".  Discussed writer is unable to do this, due to out of state and uncertain if patient needs to be seen.  Patient was argumentative, that I should not have asked any question should just send this to Dr Dean.  will route to care team to please address.  Vika Huggins RN     "

## 2021-03-26 RX ORDER — AZITHROMYCIN 250 MG/1
TABLET, FILM COATED ORAL
Qty: 6 TABLET | Refills: 0 | OUTPATIENT
Start: 2021-03-26

## 2021-08-02 ENCOUNTER — ANCILLARY PROCEDURE (OUTPATIENT)
Dept: ULTRASOUND IMAGING | Facility: CLINIC | Age: 56
End: 2021-08-02
Attending: UROLOGY
Payer: COMMERCIAL

## 2021-08-02 ENCOUNTER — OFFICE VISIT (OUTPATIENT)
Dept: FAMILY MEDICINE | Facility: CLINIC | Age: 56
End: 2021-08-02
Payer: COMMERCIAL

## 2021-08-02 DIAGNOSIS — N44.1 CYST OF TUNICA ALBUGINEA TESTIS: ICD-10-CM

## 2021-08-02 DIAGNOSIS — Z12.11 SCREEN FOR COLON CANCER: Primary | ICD-10-CM

## 2021-08-02 DIAGNOSIS — N44.2 BENIGN CYST OF BOTH TESTICLES: ICD-10-CM

## 2021-08-02 PROCEDURE — 76870 US EXAM SCROTUM: CPT | Performed by: RADIOLOGY

## 2021-08-02 PROCEDURE — 93976 VASCULAR STUDY: CPT | Performed by: RADIOLOGY

## 2021-08-02 PROCEDURE — 99213 OFFICE O/P EST LOW 20 MIN: CPT | Mod: 95 | Performed by: INTERNAL MEDICINE

## 2021-08-02 NOTE — PROGRESS NOTES
"Carloz is a 56 year old who is being evaluated via a billable telephone visit.      What phone number would you like to be contacted at?   How would you like to obtain your AVS?     [facility] Internal Medicine Progress Note           Assessment and Plan:   1. Screen for colon cancer  Screening colonoscopy orders sent.  - Adult Gastro Ref - Procedure Only; Future       28 minutes spent on the date of the encounter doing chart review, review of outside records, patient visit and documentation       Interval History:     Carloz is a 56 year old who presents today requesting screening test for colonoscopy. Mr. Deras states that he has done FIT tests for a \"long time\". The patient is [place] and he is requesting an order for a colonoscopy. Review of records show that he had a previous colonoscopy last 2014 and it was reportedly \"normal\".                Significant Problems:   Patient Active Problem List   Diagnosis     Hyperlipidemia LDL goal <130     Hypertension goal BP (blood pressure) < 130/80     Erectile dysfunction     Hypotestosteronism     Vitamin D deficiency     Obesity, Class I, BMI 30-34.9     Impingement syndrome, shoulder     Rotator cuff tendinitis     Prediabetes     Acute bronchitis, unspecified organism     Cyst of testis     Spermatocele of epididymis, multiple              Review of Systems:   CONSTITUTIONAL: NEGATIVE for fever, chills, change in weight  INTEGUMENTARY/SKIN: NEGATIVE for worrisome rashes, moles or lesions  EYES: NEGATIVE for vision changes or irritation  ENT/MOUTH: NEGATIVE for ear, mouth and throat problems  RESP: NEGATIVE for significant cough or SOB  CV: NEGATIVE for chest pain, palpitations or peripheral edema  GI: NEGATIVE for nausea, abdominal pain, heartburn, or change in bowel habits  : NEGATIVE for frequency, dysuria, or hematuria  MUSCULOSKELETAL: NEGATIVE for significant arthralgias or myalgia  NEURO: NEGATIVE for weakness, dizziness or paresthesias  ENDOCRINE: " NEGATIVE for temperature intolerance, skin/hair changes  HEME: NEGATIVE for bleeding problems  PSYCHIATRIC: NEGATIVE for changes in mood or affect            Medications:     Current Outpatient Medications   Medication Sig     chlorthalidone (HYGROTON) 25 MG tablet TAKE ONE TABLET BY MOUTH ONE TIME DAILY for blood pressure.     lisinopril (ZESTRIL) 20 MG tablet TAKE ONE TABLET BY MOUTH ONE TIME DAILY for blood pressure     MULTIPLE VITAMINS PO Take by mouth daily     sildenafil (VIAGRA) 50 MG tablet Take 1 tablet (50 mg) by mouth daily as needed (erectile dysfunction)     simvastatin (ZOCOR) 40 MG tablet TAKE ONE TABLET BY MOUTH IN THE EVENING for cholesterol     No current facility-administered medications for this visit.             Physical Exam:   Vital signs not obtained due to nature of visit.    Remainder of exam not performed due to nature of visit.          Data:   Result Information    Status: Final result (Resulted: 11/14/2005) Provider Status: Open   Narrative    Colonoscopy done on this date: 11/14/05 (approximately), by this   group: [facility], results were normal.           Disposition:  Follow-up in 4 weeks or as needed.    Bijan Dean MD  Internal Medicine  [facility] Team    Phone call duration: 12 minutes  Start: 11:00 AM  End: 11:12 AM

## 2021-09-10 ENCOUNTER — TELEPHONE (OUTPATIENT)
Dept: UROLOGY | Facility: CLINIC | Age: 56
End: 2021-09-10

## 2021-09-10 NOTE — TELEPHONE ENCOUNTER
Patient has been scheduled with Dr Crow for a phone visit to review results.    Trish Bañuelos  Surgical Specialties Procedure   [employer] [place]  9/10/2021 1:14 PM

## 2021-09-10 NOTE — TELEPHONE ENCOUNTER
Patient calling primary care to get results of his US from 8/2/2021.  Explained to patient that urology ordered the test and results will need to come from urology    Routing to urology to please contact patient back with results    Zay Lee RN  [facility]

## 2021-09-21 ENCOUNTER — VIRTUAL VISIT (OUTPATIENT)
Dept: UROLOGY | Facility: CLINIC | Age: 56
End: 2021-09-21
Payer: COMMERCIAL

## 2021-09-21 DIAGNOSIS — N43.40 SPERMATOCELE: Primary | ICD-10-CM

## 2021-09-21 PROCEDURE — 99213 OFFICE O/P EST LOW 20 MIN: CPT | Mod: 95 | Performed by: UROLOGY

## 2021-09-21 NOTE — PROGRESS NOTES
"[facility]  CHIEF COMPLAINT   It was my pleasure to see Jose Alejandro Deras who is a 56 year old male for follow-up of .      HPI   Jose Alejandro Deras is a very pleasant 56 year old male      Initially seen 2/2/21:  \"Jose Alejandro Deras is a 55 year old male who is being seen for evaluation of a cyst of the left testicle as well as bilateral epididymal cysts.  He first noticed a palpable lump in the posterior aspect of his left testicle a few months ago.  He was evaluated by his PCP, Dr. Dean, who obtained a scrotal ultrasound.  He denies any change in size of this cyst, or pain associated with this.  He denies any family history of testicular cancer.\"    TODAY 9/21/21:  He is doing very well with no issues  He feels the small cyst has shrunk and now is totally gone after his repeat ultrasound at the beginning of August  No other concerns or issues    PHYSICAL EXAM  Patient is a 56 year old  male   Vitals: There were no vitals taken for this visit.  There is no height or weight on file to calculate BMI.  General: No evidence of distress   Lungs: normal respiratory effort  Neuro: Alert, oriented, speech and mentation normal  Psych: affect and mood normal  : normal circumcised phallus, normal RIGHT testicle, small RIGHT epididymal cyst, LEFT testicle with a small palpable nodule on the posterior and inferior aspect of the testicle, small LEFT epididymal cyst (2/2/21)    Creatinine   Date Value Ref Range Status   10/29/2020 0.77 0.66 - 1.25 mg/dL Final      PSA   Date Value Ref Range Status   10/29/2020 1.62 0 - 4 ug/L Final     Comment:     Assay Method:  Chemiluminescence using Siemens Vista analyzer      IMAGING:  All pertinent imaging reviewed:     All imaging studies reviewed by me.  I personally reviewed these imaging films.  A formal report from radiology will follow.     TESTICULAR U/S 10/29/20  FINDINGS:  RIGHT: Right testicle measures 3.8 x 2.5 x 3.7 cm. Normal testicle  with no masses. Normal arterial duplex " and normal color flow. Two  epididymal simple cysts are noted, with the largest in the epididymal  tail measuring 1 cm. No hydrocele. Small right hydrocele.     LEFT: Left testicle measures 4 x 2.6 x 3 cm. There is a small simple  cyst along the anterior aspect of the left testicle inferiorly,  measuring 0.6 cm. 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 No other focal testicular lesions  are identified. Normal arterial duplex and normal color flow. A simple  cyst in the left epididymal head measures 1.7 cm. Small left  hydrocele. No varicocele.                                                IMPRESSION:  1.  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  2.  Bilateral epididymal simple cysts are also noted, with the largest  in the left epididymal head measuring 1.7 cm.  3.  Small bilateral hydroceles.  4.  No evidence for testicular torsion or intratesticular mass.    TESTICULAR U/S 8/2/21:  Findings:  The testes demonstrate normal and symmetric echotexture and  vascularity. No evidence of a focal lesion.  The right testicle  measures 4.4 x 2.5 x 3.2 cm. cm and the left measures 4.2 x 2.8 x 2.7  cm. An anterior left testicular cyst measures 5 x 4 x 4 mm. There is  no evidence of torsion.     Both the right and left epididymis are normal in size. 3 right  epididymal cysts are noted, largest measuring 0.7 x 1.1 x 0.4 cm. A  bilobed cyst in the left epididymis measures 1.7 x 1.5 x 0.9 cm.      Small to moderate bilateral hydroceles are noted. No varicocele.                                                      Impression:   1.  Similar appearance of left testicular tunica albuginea cyst, as  well as bilateral epididymal cysts.  2.  Small-to-moderate bilateral hydroceles.    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  Telephone time: 5 minutes    Joseph Crow MD   Urology  [facility]  [facility] Phone: [phone]  [facility] Phone: [phone]        Carloz is a 56 year old who is being evaluated via a billable telephone visit.      What phone number would you like to be contacted at? [phone]  How would you like to obtain your AVS? Mail a copy

## 2022-02-28 ENCOUNTER — OFFICE VISIT (OUTPATIENT)
Dept: URBAN - METROPOLITAN AREA SURGERY CENTER 13 | Facility: SURGERY CENTER | Age: 57
End: 2022-02-28
Payer: COMMERCIAL

## 2022-02-28 DIAGNOSIS — K63.89 BACTERIAL OVERGROWTH SYNDROME: ICD-10-CM

## 2022-02-28 PROCEDURE — G8907 PT DOC NO EVENTS ON DISCHARG: HCPCS | Performed by: INTERNAL MEDICINE

## 2022-02-28 PROCEDURE — 45385 COLONOSCOPY W/LESION REMOVAL: CPT | Performed by: INTERNAL MEDICINE

## 2022-10-07 DIAGNOSIS — I10 HYPERTENSION GOAL BP (BLOOD PRESSURE) < 130/80: ICD-10-CM

## 2022-10-07 DIAGNOSIS — E78.5 HYPERLIPIDEMIA LDL GOAL <130: ICD-10-CM

## 2022-10-07 DIAGNOSIS — N52.8 OTHER MALE ERECTILE DYSFUNCTION: ICD-10-CM

## 2022-10-07 NOTE — TELEPHONE ENCOUNTER
.Reason for call:  Medication   If this is a refill request, has the caller requested the refill from the pharmacy already? No  Will the patient be using a [facility]? No  Name of the pharmacy and phone number for the current request: [facility]    Name of the medication requested: zestril, hygoton and zocor  And Viagra  Other request: fill script    Phone number to reach patient:  Home number on file [phone] (home)    Best Time:  anytime    Can we leave a detailed message on this number?  YES    Travel screening: Negative

## 2022-10-09 RX ORDER — CHLORTHALIDONE 25 MG/1
TABLET ORAL
Qty: 90 TABLET | Refills: 0 | Status: SHIPPED | OUTPATIENT
Start: 2022-10-09

## 2022-10-09 RX ORDER — LISINOPRIL 20 MG/1
TABLET ORAL
Qty: 90 TABLET | Refills: 0 | Status: SHIPPED | OUTPATIENT
Start: 2022-10-09

## 2022-10-09 RX ORDER — SILDENAFIL 50 MG/1
TABLET, FILM COATED ORAL
Qty: 30 TABLET | Refills: 2 | Status: SHIPPED | OUTPATIENT
Start: 2022-10-09 | End: 2023-08-15

## 2022-10-09 RX ORDER — SIMVASTATIN 40 MG
TABLET ORAL
Qty: 90 TABLET | Refills: 0 | Status: SHIPPED | OUTPATIENT
Start: 2022-10-09

## 2023-08-15 DIAGNOSIS — N52.8 OTHER MALE ERECTILE DYSFUNCTION: ICD-10-CM

## 2023-08-15 RX ORDER — SILDENAFIL 50 MG/1
TABLET, FILM COATED ORAL
Qty: 30 TABLET | Refills: 0 | Status: SHIPPED | OUTPATIENT
Start: 2023-08-15

## 2025-01-27 NOTE — PATIENT INSTRUCTIONS
At [facility], we strive to deliver an exceptional experience to you, every time we see you. If you receive a survey, please complete it as we do value your feedback.  If you have MyChart, you can expect to receive results automatically within 24 hours of their completion.  Your provider will send a note interpreting your results as well.   If you do not have MyChart, you should receive your results in about a week by mail.    Your care team:                            Family Medicine Internal Medicine   MD Bijan Medrano MD Shantel Branch-Fleming, MD Katya Georgiev PA-C Megan Hill, APRJAVIER Morrison, MD Pediatrics   Jose Amaya, PAEMMA Benjamin, MD Lydia Briones APRN CNP   MD Teri Weinberg MD Deborah Mielke, MD Kim Thein, APRN [facility]      Clinic hours: Monday - Thursday 7 am-7 pm; Fridays 7 am-5 pm.   Urgent care: Monday - Friday 11 am-9 pm; Saturday and Sunday 9 am-5 pm.    Clinic: [phone]       [facility]: Monday - Thursday 8 am - 7 pm; Friday 8 am - 6 pm  [facility]: [phone]     Use [email or web address] for 24/7 diagnosis and treatment of dozens of conditions.    
Normal for race